# Patient Record
Sex: FEMALE | Race: WHITE | NOT HISPANIC OR LATINO | Employment: FULL TIME | URBAN - METROPOLITAN AREA
[De-identification: names, ages, dates, MRNs, and addresses within clinical notes are randomized per-mention and may not be internally consistent; named-entity substitution may affect disease eponyms.]

---

## 2019-04-30 ENCOUNTER — OFFICE VISIT - HEALTHEAST (OUTPATIENT)
Dept: FAMILY MEDICINE | Facility: CLINIC | Age: 24
End: 2019-04-30

## 2019-04-30 ENCOUNTER — COMMUNICATION - HEALTHEAST (OUTPATIENT)
Dept: TELEHEALTH | Facility: CLINIC | Age: 24
End: 2019-04-30

## 2019-04-30 DIAGNOSIS — Z11.3 SCREENING FOR STD (SEXUALLY TRANSMITTED DISEASE): ICD-10-CM

## 2019-04-30 DIAGNOSIS — R55 NEAR SYNCOPE: ICD-10-CM

## 2019-04-30 LAB
ERYTHROCYTE [DISTWIDTH] IN BLOOD BY AUTOMATED COUNT: 10.5 % (ref 11–14.5)
HCG UR QL: NEGATIVE
HCT VFR BLD AUTO: 42.3 % (ref 35–47)
HGB BLD-MCNC: 14 G/DL (ref 12–16)
MCH RBC QN AUTO: 32.9 PG (ref 27–34)
MCHC RBC AUTO-ENTMCNC: 33.2 G/DL (ref 32–36)
MCV RBC AUTO: 99 FL (ref 80–100)
PLATELET # BLD AUTO: 212 THOU/UL (ref 140–440)
PMV BLD AUTO: 8.6 FL (ref 7–10)
RBC # BLD AUTO: 4.26 MILL/UL (ref 3.8–5.4)
TSH SERPL DL<=0.005 MIU/L-ACNC: 1.61 UIU/ML (ref 0.3–5)
WBC: 6.9 THOU/UL (ref 4–11)

## 2019-04-30 ASSESSMENT — MIFFLIN-ST. JEOR: SCORE: 1395.86

## 2019-05-01 LAB
C TRACH DNA SPEC QL PROBE+SIG AMP: NEGATIVE
N GONORRHOEA DNA SPEC QL NAA+PROBE: NEGATIVE

## 2019-05-02 ENCOUNTER — COMMUNICATION - HEALTHEAST (OUTPATIENT)
Dept: FAMILY MEDICINE | Facility: CLINIC | Age: 24
End: 2019-05-02

## 2019-05-02 DIAGNOSIS — R55 NEAR SYNCOPE: ICD-10-CM

## 2019-05-07 ENCOUNTER — OFFICE VISIT - HEALTHEAST (OUTPATIENT)
Dept: FAMILY MEDICINE | Facility: CLINIC | Age: 24
End: 2019-05-07

## 2019-05-07 DIAGNOSIS — R07.0 THROAT PAIN: ICD-10-CM

## 2019-05-07 LAB — DEPRECATED S PYO AG THROAT QL EIA: NORMAL

## 2019-05-08 LAB — GROUP A STREP BY PCR: NORMAL

## 2019-05-14 ENCOUNTER — OFFICE VISIT - HEALTHEAST (OUTPATIENT)
Dept: FAMILY MEDICINE | Facility: CLINIC | Age: 24
End: 2019-05-14

## 2019-05-14 DIAGNOSIS — R09.81 NASAL CONGESTION: ICD-10-CM

## 2019-05-14 DIAGNOSIS — J02.9 SORE THROAT: ICD-10-CM

## 2019-05-14 DIAGNOSIS — H65.192 ACUTE EFFUSION OF LEFT EAR: ICD-10-CM

## 2019-05-14 LAB — DEPRECATED S PYO AG THROAT QL EIA: NORMAL

## 2019-05-15 ENCOUNTER — HOSPITAL ENCOUNTER (OUTPATIENT)
Dept: CARDIOLOGY | Facility: HOSPITAL | Age: 24
Discharge: HOME | End: 2019-05-15

## 2019-05-15 DIAGNOSIS — R55 NEAR SYNCOPE: ICD-10-CM

## 2019-05-15 LAB
AORTIC ROOT: 2.8 CM
BSA FOR ECHO PROCEDURE: 1.73 M2
CV BLOOD PRESSURE: NORMAL MMHG
CV ECHO HEIGHT: 65 IN
CV ECHO WEIGHT: 145 LBS
DOP CALC LVOT AREA: 2.54 CM2
DOP CALC LVOT DIAMETER: 1.8 CM
DOP CALC LVOT PEAK VEL: 85 CM/S
DOP CALC LVOT STROKE VOLUME: 45.3 CM3
DOP CALCLVOT PEAK VEL VTI: 17.8 CM
EJECTION FRACTION: 73 % (ref 55–75)
FRACTIONAL SHORTENING: 45.8 % (ref 28–44)
GROUP A STREP BY PCR: NORMAL
INTERVENTRICULAR SEPTUM IN END DIASTOLE: 0.71 CM (ref 0.6–0.9)
IVS/PW RATIO: 1.1
LEFT ATRIUM SIZE: 3.1 CM
LEFT ATRIUM TO AORTIC ROOT RATIO: 1.11 NO UNITS
LEFT VENTRICLE CARDIAC INDEX: 2 L/MIN/M2
LEFT VENTRICLE CARDIAC OUTPUT: 3.4 L/MIN
LEFT VENTRICLE DIASTOLIC VOLUME INDEX: 34.8 CM3/M2 (ref 29–61)
LEFT VENTRICLE DIASTOLIC VOLUME: 60.2 CM3 (ref 46–106)
LEFT VENTRICLE HEART RATE: 76 BPM
LEFT VENTRICLE MASS INDEX: 46.8 G/M2
LEFT VENTRICLE SYSTOLIC VOLUME INDEX: 9.4 CM3/M2 (ref 8–24)
LEFT VENTRICLE SYSTOLIC VOLUME: 16.3 CM3 (ref 14–42)
LEFT VENTRICULAR INTERNAL DIMENSION IN DIASTOLE: 4.17 CM (ref 3.8–5.2)
LEFT VENTRICULAR INTERNAL DIMENSION IN SYSTOLE: 2.26 CM (ref 2.2–3.5)
LEFT VENTRICULAR MASS: 81 G
LEFT VENTRICULAR OUTFLOW TRACT MEAN GRADIENT: 2 MMHG
LEFT VENTRICULAR OUTFLOW TRACT MEAN VELOCITY: 58.8 CM/S
LEFT VENTRICULAR OUTFLOW TRACT PEAK GRADIENT: 3 MMHG
LEFT VENTRICULAR POSTERIOR WALL IN END DIASTOLE: 0.65 CM (ref 0.6–0.9)
LV STROKE VOLUME INDEX: 26.2 ML/M2
MITRAL VALVE E/A RATIO: 2
MV AVERAGE E/E' RATIO: 5.2 CM/S
MV DECELERATION TIME: 225 MS
MV E'TISSUE VEL-LAT: 20 CM/S
MV E'TISSUE VEL-MED: 13.8 CM/S
MV LATERAL E/E' RATIO: 4.4
MV MEDIAL E/E' RATIO: 6.3
MV PEAK A VELOCITY: 43.7 CM/S
MV PEAK E VELOCITY: 87.3 CM/S
NUC REST DIASTOLIC VOLUME INDEX: 2320 LBS
NUC REST SYSTOLIC VOLUME INDEX: 65 IN

## 2019-05-15 ASSESSMENT — MIFFLIN-ST. JEOR
SCORE: 1393.6
SCORE: 1393.6

## 2019-05-16 ENCOUNTER — COMMUNICATION - HEALTHEAST (OUTPATIENT)
Dept: FAMILY MEDICINE | Facility: CLINIC | Age: 24
End: 2019-05-16

## 2019-05-24 ENCOUNTER — OFFICE VISIT - HEALTHEAST (OUTPATIENT)
Dept: CARDIOLOGY | Facility: CLINIC | Age: 24
End: 2019-05-24

## 2019-05-24 DIAGNOSIS — R06.02 SOB (SHORTNESS OF BREATH): ICD-10-CM

## 2019-05-24 DIAGNOSIS — R07.9 CHEST PAIN, UNSPECIFIED TYPE: ICD-10-CM

## 2019-05-24 DIAGNOSIS — R42 DIZZINESS: ICD-10-CM

## 2019-05-24 ASSESSMENT — MIFFLIN-ST. JEOR: SCORE: 1398.13

## 2019-05-25 LAB
ATRIAL RATE - MUSE: 66 BPM
DIASTOLIC BLOOD PRESSURE - MUSE: NORMAL MMHG
INTERPRETATION ECG - MUSE: NORMAL
P AXIS - MUSE: 34 DEGREES
PR INTERVAL - MUSE: 150 MS
QRS DURATION - MUSE: 84 MS
QT - MUSE: 396 MS
QTC - MUSE: 415 MS
R AXIS - MUSE: 77 DEGREES
SYSTOLIC BLOOD PRESSURE - MUSE: NORMAL MMHG
T AXIS - MUSE: 44 DEGREES
VENTRICULAR RATE- MUSE: 66 BPM

## 2019-05-29 ENCOUNTER — COMMUNICATION - HEALTHEAST (OUTPATIENT)
Dept: FAMILY MEDICINE | Facility: CLINIC | Age: 24
End: 2019-05-29

## 2019-10-03 ENCOUNTER — RECORDS - HEALTHEAST (OUTPATIENT)
Dept: ADMINISTRATIVE | Facility: OTHER | Age: 24
End: 2019-10-03

## 2019-11-24 ENCOUNTER — OFFICE VISIT (OUTPATIENT)
Dept: URGENT CARE | Facility: URGENT CARE | Age: 24
End: 2019-11-24
Payer: COMMERCIAL

## 2019-11-24 VITALS
HEART RATE: 71 BPM | HEIGHT: 66 IN | OXYGEN SATURATION: 98 % | BODY MASS INDEX: 22.82 KG/M2 | WEIGHT: 142 LBS | SYSTOLIC BLOOD PRESSURE: 107 MMHG | DIASTOLIC BLOOD PRESSURE: 74 MMHG | TEMPERATURE: 98.1 F

## 2019-11-24 DIAGNOSIS — J32.9 SINOBRONCHITIS: Primary | ICD-10-CM

## 2019-11-24 DIAGNOSIS — J40 SINOBRONCHITIS: Primary | ICD-10-CM

## 2019-11-24 LAB
DEPRECATED S PYO AG THROAT QL EIA: NORMAL
SPECIMEN SOURCE: NORMAL

## 2019-11-24 PROCEDURE — 87081 CULTURE SCREEN ONLY: CPT | Performed by: FAMILY MEDICINE

## 2019-11-24 PROCEDURE — 87880 STREP A ASSAY W/OPTIC: CPT | Performed by: FAMILY MEDICINE

## 2019-11-24 PROCEDURE — 99203 OFFICE O/P NEW LOW 30 MIN: CPT | Performed by: FAMILY MEDICINE

## 2019-11-24 RX ORDER — AZITHROMYCIN 250 MG/1
TABLET, FILM COATED ORAL
Qty: 6 TABLET | Refills: 0 | Status: SHIPPED | OUTPATIENT
Start: 2019-11-24 | End: 2019-11-29

## 2019-11-24 ASSESSMENT — MIFFLIN-ST. JEOR: SCORE: 1400.86

## 2019-11-24 NOTE — PROGRESS NOTES
Subjective: She gets sick about 5 days ago with a headache and a sore throat and a fever up to 101 maximal, was feeling better after 2 or 3 days then Thursday and Friday he felt really back to normal almost, went back to work, and then suddenly things got worse yesterday with pain in her sinuses and a worsening sore throat.  No exposures to anything she is aware of.    Objective: ENT with some maxillary pain, neck with some anterior nodes that are painful, lungs clear, strep test negative.    Assessment and plan: Originally viral illness but now with second sickening, not strep.  Zithromax would be appropriate treatment.

## 2019-11-25 LAB
BACTERIA SPEC CULT: NORMAL
SPECIMEN SOURCE: NORMAL

## 2020-01-20 ENCOUNTER — COMMUNICATION - HEALTHEAST (OUTPATIENT)
Dept: FAMILY MEDICINE | Facility: CLINIC | Age: 25
End: 2020-01-20

## 2020-01-24 ENCOUNTER — OFFICE VISIT - HEALTHEAST (OUTPATIENT)
Dept: INTERNAL MEDICINE | Facility: CLINIC | Age: 25
End: 2020-01-24

## 2020-01-24 DIAGNOSIS — N89.8 VAGINAL IRRITATION: ICD-10-CM

## 2020-01-24 DIAGNOSIS — N92.6 IRREGULAR MENSTRUAL BLEEDING: ICD-10-CM

## 2020-01-24 LAB
CLUE CELLS: NORMAL
TRICHOMONAS, WET PREP: NORMAL
YEAST, WET PREP: NORMAL

## 2020-01-24 ASSESSMENT — MIFFLIN-ST. JEOR: SCORE: 1367.23

## 2020-03-11 ENCOUNTER — COMMUNICATION - HEALTHEAST (OUTPATIENT)
Dept: INTERNAL MEDICINE | Facility: CLINIC | Age: 25
End: 2020-03-11

## 2020-03-11 ENCOUNTER — COMMUNICATION - HEALTHEAST (OUTPATIENT)
Dept: SCHEDULING | Facility: CLINIC | Age: 25
End: 2020-03-11

## 2020-03-12 ENCOUNTER — OFFICE VISIT - HEALTHEAST (OUTPATIENT)
Dept: INTERNAL MEDICINE | Facility: CLINIC | Age: 25
End: 2020-03-12

## 2020-03-12 DIAGNOSIS — J20.9 ACUTE BRONCHITIS WITH SYMPTOMS > 10 DAYS: ICD-10-CM

## 2020-03-12 DIAGNOSIS — J01.90 ACUTE SINUSITIS WITH SYMPTOMS > 10 DAYS: ICD-10-CM

## 2020-03-12 ASSESSMENT — MIFFLIN-ST. JEOR: SCORE: 1379.99

## 2020-03-23 ENCOUNTER — VIRTUAL VISIT (OUTPATIENT)
Dept: FAMILY MEDICINE | Facility: OTHER | Age: 25
End: 2020-03-23

## 2020-03-23 NOTE — PROGRESS NOTES
"Date: 2020 17:44:02  Clinician: Coral Franks  Clinician NPI: [id number]  Patient: Alicia Reyes  Patient : 1995  Patient Address: [address] [place] [id number]  Patient Phone: [phone]  Visit Protocol: URI  Patient Summary:  Alicia is a 24 year old ( : 1995 ) female who initiated a Visit for cold, sinus infection, or influenza. When asked the question \"Please sign me up to receive news, health information and promotions from [employer].\", Alicia responded \"Yes\".    Alicia states her symptoms started gradually 1 month or more ago.   Her symptoms consist of rhinitis, myalgia, a cough, nasal congestion, and malaise. She is experiencing mild difficulty breathing with activities but can speak normally in full sentences.   Symptom details     Nasal secretions: The color of her mucus is clear, white, and yellow.    Cough: Alicia coughs every 5-10 minutes and her cough is more bothersome at night. Phlegm comes into her throat when she coughs. She does not believe her cough is caused by post-nasal drip. The color of the phlegm is clear, yellow, and white.      Alicia denies having ear pain, enlarged lymph nodes, facial pain or pressure, wheezing, sore throat, chills, teeth pain, headache, and fever. She also denies double sickening (worsening symptoms after initial improvement) and having recent facial or sinus surgery in the past 60 days.   Precipitating events  She has not recently been exposed to someone with influenza. Alicia has been in close contact with the following high risk individuals: immunocompromised people.   Pertinent COVID-19 (Coronavirus) information  Alicia has not traveled internationally or to the areas where COVID-19 (Coronavirus) is widespread, including cruise ship travel in the last 14 days before the start of her symptoms.   Alicia has not had a close contact with a laboratory-confirmed COVID-19 patient within 14 days of symptom onset. She also has not had a close contact with a " suspected COVID-19 patient within 14 days of symptom onset.   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   Pertinent medical history  Alicia has taken an antibiotic medication in the past month. Antibiotic details as reported by the patient (free text): Amoxicillin- currently, have 3 days left, not feeling better   Alicia does not get yeast infections when she takes antibiotics.   Alicia does not need a return to work/school note.   Weight: 150 lbs   Alicia does not smoke or use smokeless tobacco.   She denies pregnancy and denies breastfeeding. She has menstruated in the past month.   Additional information as reported by the patient (free text): Productive cough began developing Feb 21 along with daily headaches. Headaches stopped, but cough persisted. I've only had a cough up until about 3 or 4 days ago when I started developing a runny/stuffy nose. Over the past couple of weeks, I have had episodes of shortness of breath/chest heaviness and feeling like I'm going to pass out. I went into my primary care on 3/12 who prescribed me amoxicillin. I haven't been feeling better.   Weight: 150 lbs    MEDICATIONS: norgestimate-ethinyl estradiol oral, benzonatate oral, cephalexin oral, amoxicillin oral, ALLERGIES: NKDA  Clinician Response:  Dear Alicia,   Based on the information you have provided, further evaluation in urgent care is indicated. You may walk in to one of our designated urgent care sites below that is prepared to see patients who have symptoms that could indicate Coronavirus (Covid-19).   For your information: At this time we will NOT perform coronavirus testing in urgent care sites. This test is currently being reserved for patients who are being admitted to the hospital.  [facility] [address] [place] [address]. Hours: M-F 11am -- 9pm, Sat-Sun 9am-5pm  [facility] [address] [place] [id number]. Hour: M-F 1pm-9pm, Sat 8am-9pm, Sunday  10am-8pm  [facility] [address] [place] [address]. Hours: M-F 7am-7pm, Sat-Sun 8am-4pm.   PLEASE BRING DOCUMENTATION FROM THIS COMPLETED [facility] VISIT TO YOUR URGENT CARE VISIT.     For more information about COVID19 and options for caring for yourself at home, please visit the CDC website at https://www.cdc.gov/coronavirus/2019-ncov/about/steps-when-sick.html  For more options for care at [facility], please visit our website at [email or web address]    Diagnosis: Acute upper respiratory infection, unspecified  Diagnosis ICD: J06.9

## 2020-06-04 ENCOUNTER — HOSPITAL ENCOUNTER (EMERGENCY)
Facility: CLINIC | Age: 25
Discharge: HOME | End: 2020-06-04
Attending: EMERGENCY MEDICINE | Admitting: EMERGENCY MEDICINE
Payer: COMMERCIAL

## 2020-06-04 VITALS
TEMPERATURE: 98 F | HEART RATE: 92 BPM | RESPIRATION RATE: 20 BRPM | OXYGEN SATURATION: 99 % | DIASTOLIC BLOOD PRESSURE: 84 MMHG | SYSTOLIC BLOOD PRESSURE: 117 MMHG

## 2020-06-04 DIAGNOSIS — R00.2 PALPITATIONS: ICD-10-CM

## 2020-06-04 PROCEDURE — 99283 EMERGENCY DEPT VISIT LOW MDM: CPT | Performed by: EMERGENCY MEDICINE

## 2020-06-04 PROCEDURE — 93010 ELECTROCARDIOGRAM REPORT: CPT | Mod: Z6 | Performed by: EMERGENCY MEDICINE

## 2020-06-04 PROCEDURE — 93005 ELECTROCARDIOGRAM TRACING: CPT | Performed by: EMERGENCY MEDICINE

## 2020-06-04 PROCEDURE — 99283 EMERGENCY DEPT VISIT LOW MDM: CPT | Mod: 25 | Performed by: EMERGENCY MEDICINE

## 2020-06-04 ASSESSMENT — ENCOUNTER SYMPTOMS
FEVER: 0
PALPITATIONS: 1
LIGHT-HEADEDNESS: 1

## 2020-06-04 NOTE — ED AVS SNAPSHOT
[facility], [place], Emergency Department  [address] [place] [id number]  Phone:  [phone]                                    Alicia Reyes   MRN: [id number]    Department:  [facility], Emergency Department   Date of Visit:  6/4/2020           After Visit Summary Signature Page    I have received my discharge instructions, and my questions have been answered. I have discussed any challenges I see with this plan with the nurse or doctor.    ..........................................................................................................................................  Patient/Patient Representative Signature      ..........................................................................................................................................  Patient Representative Print Name and Relationship to Patient    ..................................................               ................................................  Date                                   Time    ..........................................................................................................................................  Reviewed by Signature/Title    ...................................................              ..............................................  Date                                               Time          22EPIC Rev 08/18

## 2020-06-04 NOTE — ED TRIAGE NOTES
"Patient reports an period where she \"blacked out\". She was on a video conference call and was in the middle of the call when she reports she was suddenly finished with the call and unsure how the remainder of the call went. She then experienced a sudden onset of bilateral numbness in hands and shortness of breath. Her hands are still numb, but sensation is improving.   "

## 2020-06-04 NOTE — DISCHARGE INSTRUCTIONS
Please make an appointment to follow up with Your Primary Care Provider as soon as possible for a recheck, especially if you are having any further symptom episodes.    Return if you feel worse, light headed or short of breath.

## 2020-06-04 NOTE — ED PROVIDER NOTES
[facility] EMERGENCY DEPARTMENT ([facility])  June 4, 2020  History     Chief Complaint   Patient presents with     Shortness of Breath     Altered Mental Status     The history is provided by the patient and medical records.     Alicia Reyes is a 24 year old female who presents to the ER for evaluation after having multiple near syncopal spells today.  She has had one episode of syncope approximately 8 years ago in her college dorm shower, but at that time she was sick. No subsequent syncopal spells, but she has had recurrent spells of palpitations.  These typically happen when she is driving.  She has been evaluated with EKG and Holter monitor studies, both were unrevealing.  She has noticed that these episodes are occurring more frequently. She works for [employer] and has been working from home since March. Today she was on a video conference call with a patient when she had an episode of feeling woozy, flushed and mildly lightheaded.  She states that she had trouble concentrating.  She states that her hearing seemed echo-y to her and she felt if she was out of her own body.  She also felt like she was having hot flashes, and developed bilateral numbness in her hands.  Her conference call ended but she could not recall how the call ended.  Afterwards she noticed multiple episodes of feeling near syncope and so presents for evaluation.  She notes that her heart did feel like it was racing at the time of these spells.  She denies any chest discomfort.  She endorses mild shortness of breath at this time. No ankle swelling. No recent cough or cold.  She states that the last time she was sick was in February with a cough.  She has noticed some swollen lymph nodes right side of neck and jaw for past few weeks, thought it was due to seasonal allergies and has been taking Zyrtec for past 5 days. She does take her temperature once a day, no fevers.  Her last menses was 1 week and a half ago, doesn't think she's  pregnant.  She did stop oral contraceptives a few days ago.     PAST MEDICAL HISTORY: No past medical history on file.    PAST SURGICAL HISTORY: No past surgical history on file.    Past medical history, past surgical history, medications, and allergies were reviewed with the patient. Additional pertinent items: None    FAMILY HISTORY: No family history on file.    SOCIAL HISTORY:   Social History     Tobacco Use     Smoking status: Never Smoker     Smokeless tobacco: Never Used   Substance Use Topics     Alcohol use: Not on file     Social history was reviewed with the patient. Additional pertinent items: None      Discharge Medication List as of 6/4/2020  3:28 PM      CONTINUE these medications which have NOT CHANGED    Details   Norgestim-Eth Estrad Triphasic (TRI-LO-KELLEN PO) Historical              No Known Allergies     Review of Systems   Constitutional: Negative for fever.   Cardiovascular: Positive for palpitations. Negative for leg swelling.   Neurological: Positive for light-headedness. Negative for syncope. Numbness: Paresthesias in hands.     A complete review of systems was performed with pertinent positives and negatives noted in the HPI, and all other systems negative.    Physical Exam   BP: (!) 135/118  Pulse: 96  Heart Rate: 85  Temp: 98  F (36.7  C)  Resp: 16  SpO2: 98 %      Physical Exam  Vitals signs and nursing note reviewed.   Constitutional:       General: She is not in acute distress.  HENT:      Head: Atraumatic.      Mouth/Throat:      Mouth: Mucous membranes are moist.   Eyes:      General: No scleral icterus.     Extraocular Movements: Extraocular movements intact.   Neck:      Musculoskeletal: Neck supple.   Cardiovascular:      Rate and Rhythm: Normal rate and regular rhythm.      Heart sounds: Normal heart sounds. No murmur.   Pulmonary:      Effort: Pulmonary effort is normal. No respiratory distress.      Breath sounds: No wheezing, rhonchi or rales.   Chest:      Chest wall: No  tenderness.   Abdominal:      Palpations: Abdomen is soft.      Tenderness: There is no abdominal tenderness. There is no guarding or rebound.   Musculoskeletal:      Right lower leg: No edema.      Left lower leg: No edema.   Skin:     General: Skin is warm.      Coloration: Skin is not pale.   Neurological:      General: No focal deficit present.      Mental Status: She is alert and oriented to person, place, and time.         ED Course        Procedures             EKG Interpretation:      Interpreted by Sandor Pastrana MD  Time reviewed: On arrival  Symptoms at time of EKG: Palpitations  Rhythm: normal sinus   Rate: 71  Axis: normal  Ectopy: none  Conduction: normal  ST Segments/ T Waves: No ST-T wave changes  Q Waves: none  Comparison to prior: Unchanged    Clinical Impression: normal EKG                        No results found for this or any previous visit (from the past 24 hour(s)).  Medications - No data to display          Assessments & Plan (with Medical Decision Making)   The patient had an episode of palpitations with a feeling of lightheadedness but did not pass out.  She had no cardiac ischemic symptoms.  What bothered her the most in talking to her at length was that she felt numb in both hands and on the sides of her cheeks.  She does admit that she was feeling quite anxious and though symptoms are more consistent with hyperventilation.  She underwent a Holter monitor in the past without identification of any arrhythmia.  We watched her on the monitor for 2 hours in the ED and she continued to feel better.  There is no indication for laboratory tests as she has no change in oral intake and is otherwise healthy.  The patient was still having mild symptoms when I first visited with her and on the monitor she had a heart rate in the 80s.  We went over how to measure her own pulse and she agrees to return if she has any sudden onset of rates above 120.  She is very comfortable going home.  We  discussed whether to set her up for another Holter monitor or Zio patch.  Given her normal EKG, no significant risk factors for syncope or family history of arrhythmias she will follow-up with a cardiologist or primary doctor to decide if any further testing needs to be done.  Her symptoms may have been brought on by sudden discontinuation of her oral contraceptive which she has been on since she was 11, primarily taking it for acne.  She has no symptoms or signs of a PE and is PERC negative.  She has no symptoms of fever or dyspnea to suggest the coronavirus.    I have reviewed the nursing notes.    I have reviewed the findings, diagnosis, plan and need for follow up with the patient.    Discharge Medication List as of 6/4/2020  3:28 PM          Final diagnoses:   Palpitations     I, Brigette Ram, am serving as a trained medical scribe to document services personally performed by Sandor Pastrana MD based on the provider's statements to me on June 4, 2020.  This document has been checked and approved by the attending provider.    I, Sandor Pastrana MD, was physically present and have reviewed and verified the accuracy of this note documented by Brigette Ram, medical scribe.     6/4/2020   [facility], [place], EMERGENCY DEPARTMENT     Sandor Pastrana MD  06/05/20 [id number]

## 2020-06-05 LAB — INTERPRETATION ECG - MUSE: NORMAL

## 2020-06-08 ENCOUNTER — OFFICE VISIT - HEALTHEAST (OUTPATIENT)
Dept: FAMILY MEDICINE | Facility: CLINIC | Age: 25
End: 2020-06-08

## 2020-06-08 ENCOUNTER — COMMUNICATION - HEALTHEAST (OUTPATIENT)
Dept: INTERNAL MEDICINE | Facility: CLINIC | Age: 25
End: 2020-06-08

## 2020-06-08 DIAGNOSIS — R07.89 ATYPICAL CHEST PAIN: ICD-10-CM

## 2020-06-09 ENCOUNTER — OFFICE VISIT - HEALTHEAST (OUTPATIENT)
Dept: FAMILY MEDICINE | Facility: CLINIC | Age: 25
End: 2020-06-09

## 2020-06-09 ENCOUNTER — AMBULATORY - HEALTHEAST (OUTPATIENT)
Dept: LAB | Facility: CLINIC | Age: 25
End: 2020-06-09

## 2020-06-09 DIAGNOSIS — R00.0 SINUS TACHYCARDIA: ICD-10-CM

## 2020-06-09 DIAGNOSIS — F41.9 ANXIETY: ICD-10-CM

## 2020-06-09 DIAGNOSIS — R06.09 DOE (DYSPNEA ON EXERTION): ICD-10-CM

## 2020-06-09 DIAGNOSIS — N64.4 BREAST PAIN, LEFT: ICD-10-CM

## 2020-06-09 LAB
D DIMER PPP FEU-MCNC: <0.27 FEU UG/ML
ERYTHROCYTE [DISTWIDTH] IN BLOOD BY AUTOMATED COUNT: 10.4 % (ref 11–14.5)
HCT VFR BLD AUTO: 43.4 % (ref 35–47)
HGB BLD-MCNC: 15 G/DL (ref 12–16)
MCH RBC QN AUTO: 33.6 PG (ref 27–34)
MCHC RBC AUTO-ENTMCNC: 34.6 G/DL (ref 32–36)
MCV RBC AUTO: 97 FL (ref 80–100)
PLATELET # BLD AUTO: 180 THOU/UL (ref 140–440)
PMV BLD AUTO: 8.8 FL (ref 7–10)
RBC # BLD AUTO: 4.46 MILL/UL (ref 3.8–5.4)
TROPONIN I SERPL-MCNC: <0.01 NG/ML (ref 0–0.29)
TSH SERPL DL<=0.005 MIU/L-ACNC: 1.68 UIU/ML (ref 0.3–5)
WBC: 4.8 THOU/UL (ref 4–11)

## 2020-06-09 ASSESSMENT — ANXIETY QUESTIONNAIRES
3. WORRYING TOO MUCH ABOUT DIFFERENT THINGS: SEVERAL DAYS
5. BEING SO RESTLESS THAT IT IS HARD TO SIT STILL: SEVERAL DAYS
GAD7 TOTAL SCORE: 15
IF YOU CHECKED OFF ANY PROBLEMS ON THIS QUESTIONNAIRE, HOW DIFFICULT HAVE THESE PROBLEMS MADE IT FOR YOU TO DO YOUR WORK, TAKE CARE OF THINGS AT HOME, OR GET ALONG WITH OTHER PEOPLE: VERY DIFFICULT
7. FEELING AFRAID AS IF SOMETHING AWFUL MIGHT HAPPEN: NEARLY EVERY DAY
1. FEELING NERVOUS, ANXIOUS, OR ON EDGE: NEARLY EVERY DAY
4. TROUBLE RELAXING: MORE THAN HALF THE DAYS
6. BECOMING EASILY ANNOYED OR IRRITABLE: MORE THAN HALF THE DAYS
2. NOT BEING ABLE TO STOP OR CONTROL WORRYING: NEARLY EVERY DAY

## 2020-06-09 ASSESSMENT — PATIENT HEALTH QUESTIONNAIRE - PHQ9: SUM OF ALL RESPONSES TO PHQ QUESTIONS 1-9: 1

## 2020-06-10 ENCOUNTER — COMMUNICATION - HEALTHEAST (OUTPATIENT)
Dept: FAMILY MEDICINE | Facility: CLINIC | Age: 25
End: 2020-06-10

## 2020-06-11 ENCOUNTER — COMMUNICATION - HEALTHEAST (OUTPATIENT)
Dept: FAMILY MEDICINE | Facility: CLINIC | Age: 25
End: 2020-06-11

## 2020-06-15 ENCOUNTER — HOSPITAL ENCOUNTER (OUTPATIENT)
Dept: MAMMOGRAPHY | Facility: CLINIC | Age: 25
Discharge: HOME | End: 2020-06-15

## 2020-06-15 ENCOUNTER — COMMUNICATION - HEALTHEAST (OUTPATIENT)
Dept: FAMILY MEDICINE | Facility: CLINIC | Age: 25
End: 2020-06-15

## 2020-06-15 ENCOUNTER — HOSPITAL ENCOUNTER (OUTPATIENT)
Dept: CARDIOLOGY | Facility: HOSPITAL | Age: 25
Discharge: HOME | End: 2020-06-15

## 2020-06-15 DIAGNOSIS — N64.4 BREAST PAIN, LEFT: ICD-10-CM

## 2020-06-15 DIAGNOSIS — R06.09 DOE (DYSPNEA ON EXERTION): ICD-10-CM

## 2020-06-15 LAB
CV STRESS MAX HR HE: 168
ECHO EJECTION FRACTION ESTIMATED: 60 %
RATE PRESSURE PRODUCT: NORMAL
STRESS ECHO BASELINE DIASTOLIC HE: 78
STRESS ECHO BASELINE HR: 77 BPM
STRESS ECHO BASELINE SYSTOLIC BP: 116
STRESS ECHO CALCULATED PERCENT HR: 87 %
STRESS ECHO LAST STRESS DIASTOLIC BP: 72
STRESS ECHO LAST STRESS SYSTOLIC BP: 150
STRESS ECHO POST ESTIMATED WORKLOAD: 12.1 METS
STRESS ECHO POST EXERCISE DUR MIN: 12 MIN
STRESS ECHO POST EXERCISE DUR SEC: 0 SEC
STRESS ECHO TARGET HR: 194

## 2020-06-19 ENCOUNTER — COMMUNICATION - HEALTHEAST (OUTPATIENT)
Dept: FAMILY MEDICINE | Facility: CLINIC | Age: 25
End: 2020-06-19

## 2020-07-03 ENCOUNTER — COMMUNICATION - HEALTHEAST (OUTPATIENT)
Dept: PEDIATRICS | Facility: CLINIC | Age: 25
End: 2020-07-03

## 2020-07-09 ENCOUNTER — OFFICE VISIT - HEALTHEAST (OUTPATIENT)
Dept: FAMILY MEDICINE | Facility: CLINIC | Age: 25
End: 2020-07-09

## 2020-07-09 DIAGNOSIS — F41.0 PANIC ATTACK: ICD-10-CM

## 2020-07-09 DIAGNOSIS — M79.622 PAIN IN AXILLA, LEFT: ICD-10-CM

## 2020-07-09 DIAGNOSIS — K21.9 GASTROESOPHAGEAL REFLUX DISEASE, ESOPHAGITIS PRESENCE NOT SPECIFIED: ICD-10-CM

## 2020-07-09 ASSESSMENT — MIFFLIN-ST. JEOR: SCORE: 1375.45

## 2020-07-23 ENCOUNTER — COMMUNICATION - HEALTHEAST (OUTPATIENT)
Dept: INTERNAL MEDICINE | Facility: CLINIC | Age: 25
End: 2020-07-23

## 2020-08-19 ENCOUNTER — AMBULATORY - HEALTHEAST (OUTPATIENT)
Dept: INTERNAL MEDICINE | Facility: CLINIC | Age: 25
End: 2020-08-19

## 2020-08-19 ENCOUNTER — OFFICE VISIT - HEALTHEAST (OUTPATIENT)
Dept: INTERNAL MEDICINE | Facility: CLINIC | Age: 25
End: 2020-08-19

## 2020-08-19 DIAGNOSIS — R53.83 FATIGUE, UNSPECIFIED TYPE: ICD-10-CM

## 2020-08-19 DIAGNOSIS — K21.9 GASTROESOPHAGEAL REFLUX DISEASE, ESOPHAGITIS PRESENCE NOT SPECIFIED: ICD-10-CM

## 2020-08-19 DIAGNOSIS — R35.0 URINARY FREQUENCY: ICD-10-CM

## 2020-08-19 DIAGNOSIS — Z12.4 SCREENING FOR MALIGNANT NEOPLASM OF CERVIX: ICD-10-CM

## 2020-08-19 DIAGNOSIS — F41.0 PANIC ATTACK: ICD-10-CM

## 2020-08-19 LAB
ALBUMIN SERPL-MCNC: 4.2 G/DL (ref 3.5–5)
ALBUMIN UR-MCNC: NEGATIVE MG/DL
ALP SERPL-CCNC: 47 U/L (ref 45–120)
ALT SERPL W P-5'-P-CCNC: 21 U/L (ref 0–45)
ANION GAP SERPL CALCULATED.3IONS-SCNC: 10 MMOL/L (ref 5–18)
APPEARANCE UR: CLEAR
AST SERPL W P-5'-P-CCNC: 19 U/L (ref 0–40)
BASOPHILS # BLD AUTO: 0.1 THOU/UL (ref 0–0.2)
BASOPHILS NFR BLD AUTO: 1 % (ref 0–2)
BILIRUB SERPL-MCNC: 0.2 MG/DL (ref 0–1)
BILIRUB UR QL STRIP: NEGATIVE
BUN SERPL-MCNC: 15 MG/DL (ref 8–22)
CALCIUM SERPL-MCNC: 10.1 MG/DL (ref 8.5–10.5)
CHLORIDE BLD-SCNC: 107 MMOL/L (ref 98–107)
CO2 SERPL-SCNC: 23 MMOL/L (ref 22–31)
COLOR UR AUTO: YELLOW
CREAT SERPL-MCNC: 0.79 MG/DL (ref 0.6–1.1)
EOSINOPHIL # BLD AUTO: 0.1 THOU/UL (ref 0–0.4)
EOSINOPHIL NFR BLD AUTO: 2 % (ref 0–6)
ERYTHROCYTE [DISTWIDTH] IN BLOOD BY AUTOMATED COUNT: 11.3 % (ref 11–14.5)
GFR SERPL CREATININE-BSD FRML MDRD: >60 ML/MIN/1.73M2
GLUCOSE BLD-MCNC: 90 MG/DL (ref 70–125)
GLUCOSE UR STRIP-MCNC: NEGATIVE MG/DL
HCT VFR BLD AUTO: 41 % (ref 35–47)
HGB BLD-MCNC: 14.2 G/DL (ref 12–16)
HGB UR QL STRIP: NEGATIVE
KETONES UR STRIP-MCNC: NEGATIVE MG/DL
LEUKOCYTE ESTERASE UR QL STRIP: NEGATIVE
LYMPHOCYTES # BLD AUTO: 2.4 THOU/UL (ref 0.8–4.4)
LYMPHOCYTES NFR BLD AUTO: 34 % (ref 20–40)
MCH RBC QN AUTO: 34.5 PG (ref 27–34)
MCHC RBC AUTO-ENTMCNC: 34.7 G/DL (ref 32–36)
MCV RBC AUTO: 100 FL (ref 80–100)
MONOCYTES # BLD AUTO: 0.4 THOU/UL (ref 0–0.9)
MONOCYTES NFR BLD AUTO: 6 % (ref 2–10)
NEUTROPHILS # BLD AUTO: 4.1 THOU/UL (ref 2–7.7)
NEUTROPHILS NFR BLD AUTO: 58 % (ref 50–70)
NITRATE UR QL: NEGATIVE
PH UR STRIP: 5.5 [PH] (ref 5–8)
PLATELET # BLD AUTO: 197 THOU/UL (ref 140–440)
PMV BLD AUTO: 9 FL (ref 7–10)
POTASSIUM BLD-SCNC: 4.8 MMOL/L (ref 3.5–5)
PROT SERPL-MCNC: 7.1 G/DL (ref 6–8)
RBC # BLD AUTO: 4.11 MILL/UL (ref 3.8–5.4)
SODIUM SERPL-SCNC: 140 MMOL/L (ref 136–145)
SP GR UR STRIP: <=1.005 (ref 1–1.03)
TSH SERPL DL<=0.005 MIU/L-ACNC: 2.27 UIU/ML (ref 0.3–5)
UROBILINOGEN UR STRIP-ACNC: NORMAL
WBC: 7.2 THOU/UL (ref 4–11)

## 2020-08-19 ASSESSMENT — MIFFLIN-ST. JEOR: SCORE: 1384.52

## 2020-08-19 ASSESSMENT — ANXIETY QUESTIONNAIRES
7. FEELING AFRAID AS IF SOMETHING AWFUL MIGHT HAPPEN: MORE THAN HALF THE DAYS
6. BECOMING EASILY ANNOYED OR IRRITABLE: SEVERAL DAYS
5. BEING SO RESTLESS THAT IT IS HARD TO SIT STILL: NOT AT ALL
4. TROUBLE RELAXING: NOT AT ALL
1. FEELING NERVOUS, ANXIOUS, OR ON EDGE: SEVERAL DAYS
GAD7 TOTAL SCORE: 4
IF YOU CHECKED OFF ANY PROBLEMS ON THIS QUESTIONNAIRE, HOW DIFFICULT HAVE THESE PROBLEMS MADE IT FOR YOU TO DO YOUR WORK, TAKE CARE OF THINGS AT HOME, OR GET ALONG WITH OTHER PEOPLE: NOT DIFFICULT AT ALL
3. WORRYING TOO MUCH ABOUT DIFFERENT THINGS: NOT AT ALL
2. NOT BEING ABLE TO STOP OR CONTROL WORRYING: NOT AT ALL

## 2020-08-19 ASSESSMENT — PATIENT HEALTH QUESTIONNAIRE - PHQ9: SUM OF ALL RESPONSES TO PHQ QUESTIONS 1-9: 4

## 2020-08-20 LAB — 25(OH)D3 SERPL-MCNC: 36.6 NG/ML (ref 30–80)

## 2020-08-21 LAB
BKR LAB AP ABNORMAL BLEEDING: NO
BKR LAB AP BIRTH CONTROL/HORMONES: NORMAL
BKR LAB AP CERVICAL APPEARANCE: NORMAL
BKR LAB AP GYN ADEQUACY: NORMAL
BKR LAB AP GYN INTERPRETATION: NORMAL
BKR LAB AP HPV REFLEX: NORMAL
BKR LAB AP LMP: NORMAL
BKR LAB AP PATIENT STATUS: NORMAL
BKR LAB AP PREVIOUS ABNORMAL: NO
BKR LAB AP PREVIOUS NORMAL: 2017
HIGH RISK?: NO
PATH REPORT.COMMENTS IMP SPEC: NORMAL
RESULT FLAG (HE HISTORICAL CONVERSION): NORMAL

## 2020-08-24 ENCOUNTER — COMMUNICATION - HEALTHEAST (OUTPATIENT)
Dept: INTERNAL MEDICINE | Facility: CLINIC | Age: 25
End: 2020-08-24

## 2020-09-03 ENCOUNTER — OFFICE VISIT - HEALTHEAST (OUTPATIENT)
Dept: INTERNAL MEDICINE | Facility: CLINIC | Age: 25
End: 2020-09-03

## 2020-09-03 DIAGNOSIS — R59.1 LYMPHADENOPATHY: ICD-10-CM

## 2020-09-03 DIAGNOSIS — K21.9 GASTROESOPHAGEAL REFLUX DISEASE, ESOPHAGITIS PRESENCE NOT SPECIFIED: ICD-10-CM

## 2020-09-03 DIAGNOSIS — F41.0 PANIC ATTACK: ICD-10-CM

## 2020-09-03 DIAGNOSIS — R53.83 FATIGUE, UNSPECIFIED TYPE: ICD-10-CM

## 2020-09-03 ASSESSMENT — ANXIETY QUESTIONNAIRES
6. BECOMING EASILY ANNOYED OR IRRITABLE: NOT AT ALL
GAD7 TOTAL SCORE: 4
7. FEELING AFRAID AS IF SOMETHING AWFUL MIGHT HAPPEN: NOT AT ALL
2. NOT BEING ABLE TO STOP OR CONTROL WORRYING: SEVERAL DAYS
3. WORRYING TOO MUCH ABOUT DIFFERENT THINGS: SEVERAL DAYS
5. BEING SO RESTLESS THAT IT IS HARD TO SIT STILL: NOT AT ALL
1. FEELING NERVOUS, ANXIOUS, OR ON EDGE: SEVERAL DAYS
4. TROUBLE RELAXING: SEVERAL DAYS

## 2020-09-03 ASSESSMENT — PATIENT HEALTH QUESTIONNAIRE - PHQ9: SUM OF ALL RESPONSES TO PHQ QUESTIONS 1-9: 1

## 2020-09-28 ENCOUNTER — COMMUNICATION - HEALTHEAST (OUTPATIENT)
Dept: INTERNAL MEDICINE | Facility: CLINIC | Age: 25
End: 2020-09-28

## 2020-09-29 ENCOUNTER — OFFICE VISIT - HEALTHEAST (OUTPATIENT)
Dept: FAMILY MEDICINE | Facility: CLINIC | Age: 25
End: 2020-09-29

## 2020-09-29 DIAGNOSIS — R59.1 LYMPHADENOPATHY: ICD-10-CM

## 2020-09-29 LAB
BASOPHILS # BLD AUTO: 0.1 THOU/UL (ref 0–0.2)
BASOPHILS NFR BLD AUTO: 1 % (ref 0–2)
EOSINOPHIL # BLD AUTO: 0.1 THOU/UL (ref 0–0.4)
EOSINOPHIL NFR BLD AUTO: 1 % (ref 0–6)
ERYTHROCYTE [DISTWIDTH] IN BLOOD BY AUTOMATED COUNT: 10.7 % (ref 11–14.5)
HCT VFR BLD AUTO: 41.1 % (ref 35–47)
HGB BLD-MCNC: 14.2 G/DL (ref 12–16)
LYMPHOCYTES # BLD AUTO: 2.3 THOU/UL (ref 0.8–4.4)
LYMPHOCYTES NFR BLD AUTO: 24 % (ref 20–40)
MCH RBC QN AUTO: 34.2 PG (ref 27–34)
MCHC RBC AUTO-ENTMCNC: 34.4 G/DL (ref 32–36)
MCV RBC AUTO: 99 FL (ref 80–100)
MONOCYTES # BLD AUTO: 0.5 THOU/UL (ref 0–0.9)
MONOCYTES NFR BLD AUTO: 5 % (ref 2–10)
NEUTROPHILS # BLD AUTO: 6.7 THOU/UL (ref 2–7.7)
NEUTROPHILS NFR BLD AUTO: 70 % (ref 50–70)
PLATELET # BLD AUTO: 210 THOU/UL (ref 140–440)
PMV BLD AUTO: 9 FL (ref 7–10)
RBC # BLD AUTO: 4.13 MILL/UL (ref 3.8–5.4)
WBC: 9.6 THOU/UL (ref 4–11)

## 2021-02-26 ENCOUNTER — COMMUNICATION - HEALTHEAST (OUTPATIENT)
Dept: INTERNAL MEDICINE | Facility: CLINIC | Age: 26
End: 2021-02-26

## 2021-03-04 ENCOUNTER — OFFICE VISIT - HEALTHEAST (OUTPATIENT)
Dept: INTERNAL MEDICINE | Facility: CLINIC | Age: 26
End: 2021-03-04

## 2021-03-04 DIAGNOSIS — N91.2 AMENORRHEA: ICD-10-CM

## 2021-03-04 DIAGNOSIS — N92.6 IRREGULAR MENSES: ICD-10-CM

## 2021-03-04 DIAGNOSIS — R59.1 LYMPHADENOPATHY: ICD-10-CM

## 2021-03-04 LAB — HCG UR QL: NEGATIVE

## 2021-03-04 ASSESSMENT — MIFFLIN-ST. JEOR: SCORE: 1384.52

## 2021-03-05 ENCOUNTER — HOSPITAL ENCOUNTER (OUTPATIENT)
Dept: ULTRASOUND IMAGING | Facility: CLINIC | Age: 26
Discharge: HOME | End: 2021-03-05

## 2021-03-05 DIAGNOSIS — R59.1 LYMPHADENOPATHY: ICD-10-CM

## 2021-03-05 DIAGNOSIS — N92.6 IRREGULAR MENSES: ICD-10-CM

## 2021-04-12 ENCOUNTER — COMMUNICATION - HEALTHEAST (OUTPATIENT)
Dept: INTERNAL MEDICINE | Facility: CLINIC | Age: 26
End: 2021-04-12

## 2021-05-27 ASSESSMENT — PATIENT HEALTH QUESTIONNAIRE - PHQ9
SUM OF ALL RESPONSES TO PHQ QUESTIONS 1-9: 1
SUM OF ALL RESPONSES TO PHQ QUESTIONS 1-9: 1
SUM OF ALL RESPONSES TO PHQ QUESTIONS 1-9: 4

## 2021-05-28 ASSESSMENT — ANXIETY QUESTIONNAIRES
GAD7 TOTAL SCORE: 15
GAD7 TOTAL SCORE: 4
GAD7 TOTAL SCORE: 4

## 2021-05-28 NOTE — PROGRESS NOTES
HPI:  Alicia Reyes is a 23 y.o. female who is seen for   Chief Complaint   Patient presents with     [facility]     Birth control   Alicia Reyes has had a recent insurance change and would like to establish care with [facility].  Recently ran out of her birth control.  She has been off of this for approximately 2 months, had a period last week which was regular and has had no concerns with her duration or flow.  She has had no history of STI, has been over 1 year since she had a GC chlamydia and she would like to have the screening.  She has one sexual partner, has same partner for several years.  Has no concerns for other STIs.  She has just finished a masters program, had some episodes of palpitations, chest discomfort, near syncope in the past.  States that it happened 2 different times when she was driving home from [place].  Symptoms improved and just a few seconds with airflow in the car.  She has no previous history of cardiac issues, denies any previous cardiac work-up.  Does have associated fatigue and dizziness with shortness of breath symptoms.  She has had a history of some anxiety especially related to completion of her school program.  She feels that the symptoms are improving since she has finished this stressful time.  ROS: Patient denies fever, chills, sweats, weight change, dizziness, sleep problems, wheezing, cough,  sore throat, changes in hearing, ear pain,tinnitus,  disphagia, sore throat, globus, changes in vision, eye pain eye redness, acid reflux, nausea, vomiting, diarrhea, constipation, black or bloody stools,  Dysuria, frequency, urinary incontinence, nocturia, hematuria, back pain,joint pain, bone pain, muscle cramps,edema, weakness, numbness, tingling of extremities, rash, itching, skin changes, swollen lymph nodes, thirst, increased urination, breast lumps, breast pain, nipple discharge, memory difficulties,  mood swings, (female)vaginal discharge, dyspareunia, menorrhagia, pelvic  pain, sexual dysfunction,   (male) testicular lumps, erectile dysfunction.    No results found for: HGBA1C  No results found for: GLUF, MICROALBUR, LDLCALC, CREATININE  There is no problem list on file for this patient.    No family history on file.  Social History     Socioeconomic History     Marital status: Single     Spouse name: None     Number of children: None     Years of education: None     Highest education level: None   Occupational History     None   Social Needs     Financial resource strain: None     Food insecurity:     Worry: None     Inability: None     Transportation needs:     Medical: None     Non-medical: None   Tobacco Use     Smoking status: Never Smoker     Smokeless tobacco: Never Used   Substance and Sexual Activity     Alcohol use: Yes     Comment: Social      Drug use: Never     Sexual activity: Yes     Partners: Male   Lifestyle     Physical activity:     Days per week: None     Minutes per session: None     Stress: None   Relationships     Social connections:     Talks on phone: None     Gets together: None     Attends [employer] service: None     Active member of club or organization: None     Attends meetings of clubs or organizations: None     Relationship status: None     Intimate partner violence:     Fear of current or ex partner: None     Emotionally abused: None     Physically abused: None     Forced sexual activity: None   Other Topics Concern     None   Social History Narrative     None     No past surgical history on file.  No current outpatient medications on file prior to visit.     No current facility-administered medications on file prior to visit.      No Known Allergies  OB History        1    Para        Term                AB        Living           SAB        TAB        Ectopic        Multiple        Live Births                   I have reviewed the patient's medical history in detail and updated the computerized patient record.  OBJECTIVE:  Wt Readings  from Last 3 Encounters:   04/30/19 145 lb 8 oz (66 kg)     Temp Readings from Last 3 Encounters:   No data found for Temp     BP Readings from Last 3 Encounters:   04/30/19 110/60     Pulse Readings from Last 3 Encounters:   04/30/19 75     Body mass index is 24.21 kg/m .     Alert, cooperative, well-hydrated. Appears well.  Eyes: Pupils equal, round, reactive to light.  HEENT: Sclera white, nares patent, MMM, TM's pearly bilaterally  Neck: supple, without lymphadenopathy, Thyroid freely movable and without hypotrophy or nodularity.   Lungs: Clear to auscultation. No retractions, no increased work of respiration, equal chest rise.   Heart: Regular rate and rhythm, no murmurs, clicks,   Gallops.  Abdomen: Soft, bowel sounds in 4 quadrants with no tenderness to palpation, no organomegaly or masses, no aortic or renal bruits.  Extremities: no tenderness to palpation of gastrocnemius, bilaterally.  Skin: no increased warmth, edema, or erythema of lower legs bilaterally.  Back: No cervical, thoracic or lumbar tenderness to spinous processes or musculature.  Neuro::pupils equal and reactive to light bilaterally, CN II - XII grossly intact. No focal motor/sensory deficits. DTR 2/4 all 4 extremities. Muscle Strength 5/5 all extremities, Rhomberg negative  Labs:  No results found for any previous visit.     ASSESMENT/PLAN:  1. Near syncope  Echo Complete    Holter Monitor    HM2(CBC w/o Differential)    Thyroid Cascade    HM2(CBC w/o Differential)    Thyroid Perham   2. Screening for STD (sexually transmitted disease)  norgestimate-ethinyl estradiol (ORTHO TRI-CYCLEN LO, 28,) 0.18/0.215/0.25 mg-25 mcg Tab tablet    Pregnancy (Beta-hCG, Qual), Urine    Chlamydia trachomatis & Neisseria gonorrhoeae, Amplified Detection   Discussed causes of near syncope, will get Holter monitor and echocardiogram.  Discussed the possibility that these are more anxiety driven.  Advised to watch for any further symptoms, if all resolved with  the stress explained that this is a good sign.  Advise to keep caffeine to minimum, not more than 2 cups in a day.  Drink plenty of water, get good rest.  Will restart her birth control.  Follow-up as needed for any further anxiety or chest discomfort.  Otherwise follow-up in 1 year.  Results of Holter and echocardiogram and refer based on results.  Xochitl Oglesby, MS, PA-C 05/02/19

## 2021-05-28 NOTE — PATIENT INSTRUCTIONS - HE
1.  Your rapid strep test was negative today.  You will only be notified of your confirmatory strep test results if it is positive.  2.  Begin taking Flonase and Zyrtec according to bottle instructions.  3.  If you are not having any improvement in your symptoms of sinus pressure and congestion by Friday go ahead and fill the prescription for Augmentin.  4.  This could possibly still be contagious if it is viral.  Since you have been fever free I do not think you need to be out of work, but please cover your mouth if you sneeze or cough and practice good hand hygiene.

## 2021-05-28 NOTE — PATIENT INSTRUCTIONS - HE
Rapid Strep test was negative.     If overnight test returns positive, we will call tomorrow and call in antibiotic prescription.    No notification means that overnight test returned negative.    Symptoms are likely due to viral infection that will resolve on its own in 1-2 weeks.    May continue with symptomatic treatments including:  - Salt water gargles  - Warm beverages such as a non-caffeinated tea with honey  - Throat drops  - Ibuprofen or acetaminophen for pain or fever relief    If fevers not coming down with acetaminophen or ibuprofen, shortness of breath, not tolerating oral liquid intake, drooling, or stiff neck, return for re-evaluation immediately. Otherwise, if no improvement in the next week, follow up with your primary care provider.

## 2021-05-28 NOTE — PROGRESS NOTES
Assessment:       Throat pain. - likely secondary to seasonal allergies given associated itchy eyes and rhinorrhea.      Plan:       24-hour antihistamines discussed  OTC analgesics discussed.  Salt water gargles, throat lozenges, warm tea with honey PRN  Discussed signs of worsening symptoms and when to follow-up with PCP if no symptom improvement.      Patient Instructions   Rapid Strep test was negative.     If overnight test returns positive, we will call tomorrow and call in antibiotic prescription.    No notification means that overnight test returned negative.    Symptoms are likely due to viral infection that will resolve on its own in 1-2 weeks.    May continue with symptomatic treatments including:  - Salt water gargles  - Warm beverages such as a non-caffeinated tea with honey  - Throat drops  - Ibuprofen or acetaminophen for pain or fever relief    If fevers not coming down with acetaminophen or ibuprofen, shortness of breath, not tolerating oral liquid intake, drooling, or stiff neck, return for re-evaluation immediately. Otherwise, if no improvement in the next week, follow up with your primary care provider.      Subjective:        History was provided by the patient.  Alicia Reyes is a 23 y.o. female who presents for evaluation of a sore throat. Associated symptoms include rhinorrhea, itchy eyes, and dry lips. Onset of symptoms was 1 day ago, gradually worsening since that time.  She is drinking plenty of fluids. She has had recent close exposure to someone with proven streptococcal pharyngitis. Patient denies fevers and cough.    The following portions of the patient's history were reviewed and updated as appropriate: allergies, current medications and problem list.    Review of Systems  Pertinent items are noted in HPI.    Allergies  No Known Allergies      Objective:       /74 (Patient Site: Right Arm, Patient Position: Sitting, Cuff Size: Adult Regular)   Pulse 73   Temp 97.8  F (36.6   C) (Oral)   Wt 146 lb 9.6 oz (66.5 kg)   LMP 04/12/2019   SpO2 100%   Breastfeeding? No   BMI 24.40 kg/m    General appearance: alert, appears stated age, cooperative, no distress and non-toxic  Head: Normocephalic, without obvious abnormality, atraumatic  Ears: TM's intact with mucoid fluid, no erythema or bulging; external ears normal  Nose: no discharge  Throat: posterior oropharynx with mild erythema, no tonsil swelling or exudate; MMM, lips and tongue normal  Neck: no adenopathy and supple, symmetrical, trachea midline  Lungs: clear to auscultation bilaterally and no rhonchi, rales, or wheezing  Heart: regular rate and rhythm, S1, S2 normal, no murmur, click, rub or gallop    Lab Results    Recent Results (from the past 24 hour(s))   Rapid Strep A Screen-Throat   Result Value Ref Range    Rapid Strep A Antigen No Group A Strep detected, presumptive negative No Group A Strep detected, presumptive negative     I personally reviewed these results and discussed findings with the patient.

## 2021-05-28 NOTE — PROGRESS NOTES
Chief Complaint   Patient presents with     Sore Throat     sore throat, congestion, pressure in head, patient boyfriend had strep last week        HPI:  Alicia Reyes is a 23 y.o. female who presents today complaining of sore throat, nasal congestion, and head pressure. Known strep contact from her boyfriend who had it last week. Patient was seen on 5/7/19 when she just started having a sore throat. She tested negative for strep at that time.  She denies any fever, chills, ear pain, cough, abdominal complaints.  She has never had a past medical history of seasonal allergies, but her sister developed seasonal allergies around this age.  She is not currently taking any medications for her symptoms.    History obtained from the patient.    Problem List:  There are no relevant problems documented for this patient.      No past medical history on file.    Social History     Tobacco Use     Smoking status: Never Smoker     Smokeless tobacco: Never Used   Substance Use Topics     Alcohol use: Yes     Comment: Social        Review of Systems   Constitutional: Negative for chills and fever.   HENT: Positive for congestion, postnasal drip, rhinorrhea, sinus pressure and sore throat. Negative for ear pain.    Respiratory: Negative for cough.    Gastrointestinal: Negative.    Skin: Negative for rash.   Allergic/Immunologic: Negative for environmental allergies.       Vitals:    05/14/19 0712   BP: 118/83   Pulse: 76   Resp: 12   Temp: 98.1  F (36.7  C)   TempSrc: Oral   SpO2: 100%   Weight: 145 lb 9.6 oz (66 kg)       Physical Exam   Constitutional: She appears well-developed and well-nourished. No distress.   HENT:   Head: Normocephalic and atraumatic.   Right Ear: Tympanic membrane, external ear and ear canal normal.   Left Ear: External ear and ear canal normal. Tympanic membrane is not injected, not perforated, not erythematous, not retracted and not bulging. A middle ear effusion is present.   Nose: Mucosal edema  present.   Mouth/Throat: Uvula is midline and mucous membranes are normal. Posterior oropharyngeal edema present. No oropharyngeal exudate, posterior oropharyngeal erythema or tonsillar abscesses.   Eyes: Conjunctivae are normal. Right eye exhibits no discharge. Left eye exhibits no discharge.   Neck: Normal range of motion. Neck supple.   Cardiovascular: Normal rate, regular rhythm and normal heart sounds.   Pulmonary/Chest: Effort normal and breath sounds normal. No stridor. No respiratory distress. She has no wheezes.   Lymphadenopathy:     She has cervical adenopathy (right anterior).   Skin: She is not diaphoretic.   Psychiatric: She has a normal mood and affect. Her behavior is normal. Judgment and thought content normal.       Labs:  Recent Results (from the past 72 hour(s))   Rapid Strep A Screen-Throat   Result Value Ref Range    Rapid Strep A Antigen No Group A Strep detected, presumptive negative No Group A Strep detected, presumptive negative       Clinical Decision Making:  Although patient has not had any history of seasonal allergies she states that her sister developed allergies at this age for her.  Her rapid strep was negative again today.  I recommend starting an antihistamine to see if that improves her sinus congestion and postnasal drip, but it and Augmentin prescription was postdated for 5/17/2019 in case her symptoms are not beginning to improve.  This would be covering for possible bacterial sinus infection if is not improving.  At the end of the encounter, I discussed results, diagnosis, medications. Discussed red flags for immediate return to clinic/ER, as well as indications for follow up if no improvement. Patient understood and agreed to plan. Patient was stable for discharge.    1. Nasal congestion  cetirizine (ZYRTEC) 10 MG tablet    fluticasone propionate (FLONASE ALLERGY RELIEF) 50 mcg/actuation nasal spray    amoxicillin-clavulanate (AUGMENTIN) 875-125 mg per tablet   2. Sore  throat  Rapid Strep A Screen-Throat    Group A Strep, RNA Direct Detection, Throat   3. Acute effusion of left ear           Patient Instructions   1.  Your rapid strep test was negative today.  You will only be notified of your confirmatory strep test results if it is positive.  2.  Begin taking Flonase and Zyrtec according to bottle instructions.  3.  If you are not having any improvement in your symptoms of sinus pressure and congestion by Friday go ahead and fill the prescription for Augmentin.  4.  This could possibly still be contagious if it is viral.  Since you have been fever free I do not think you need to be out of work, but please cover your mouth if you sneeze or cough and practice good hand hygiene.

## 2021-05-29 NOTE — PATIENT INSTRUCTIONS - HE
"Below is a list of instructions we discussed today in clinic:   1. I am not certain what is causing your symptoms of light headedness, but given the infrequency and how long it has been occurring, it probably is not something very serious.  2. We could consider placing an implantable heart rhythm monitor called an implantable loop recorder which can monitor your heart rhythm for up to three years. Alternatively you could buy a product called \"[employer] Mobile\" from [employer], available on [employer] for $99 to check your heart rhythm when you are having symptoms, which you can have sent to your email via a pdf to print off for my review.  3. Follow up with me as needed with recurrent symptoms.       It was a pleasure to meet with you today in clinic.  Please do not hesitate to call the [facility] clinic with any questions or concerns at [phone].    Sincerely,     Cricket Rosario MD  Non-invasive Cardiology  [facility]    "

## 2021-06-02 VITALS — BODY MASS INDEX: 24.4 KG/M2 | WEIGHT: 146.6 LBS

## 2021-06-02 VITALS — HEIGHT: 65 IN | BODY MASS INDEX: 24.16 KG/M2 | WEIGHT: 145 LBS

## 2021-06-02 VITALS — HEIGHT: 65 IN | WEIGHT: 145.5 LBS | BODY MASS INDEX: 24.24 KG/M2

## 2021-06-02 VITALS — WEIGHT: 145.6 LBS | BODY MASS INDEX: 24.23 KG/M2

## 2021-06-02 VITALS — HEIGHT: 65 IN | BODY MASS INDEX: 24.32 KG/M2 | WEIGHT: 146 LBS

## 2021-06-04 VITALS
RESPIRATION RATE: 14 BRPM | TEMPERATURE: 98 F | WEIGHT: 141 LBS | DIASTOLIC BLOOD PRESSURE: 66 MMHG | OXYGEN SATURATION: 100 % | SYSTOLIC BLOOD PRESSURE: 114 MMHG | BODY MASS INDEX: 23.49 KG/M2 | HEIGHT: 65 IN | HEART RATE: 84 BPM

## 2021-06-04 VITALS
RESPIRATION RATE: 20 BRPM | BODY MASS INDEX: 23.66 KG/M2 | OXYGEN SATURATION: 100 % | TEMPERATURE: 98.5 F | SYSTOLIC BLOOD PRESSURE: 108 MMHG | HEART RATE: 68 BPM | DIASTOLIC BLOOD PRESSURE: 68 MMHG | HEIGHT: 65 IN | WEIGHT: 142 LBS

## 2021-06-04 VITALS
OXYGEN SATURATION: 100 % | TEMPERATURE: 98.3 F | HEART RATE: 73 BPM | SYSTOLIC BLOOD PRESSURE: 124 MMHG | RESPIRATION RATE: 16 BRPM | WEIGHT: 143 LBS | BODY MASS INDEX: 23.8 KG/M2 | DIASTOLIC BLOOD PRESSURE: 86 MMHG

## 2021-06-04 VITALS
TEMPERATURE: 98.2 F | WEIGHT: 143 LBS | BODY MASS INDEX: 23.82 KG/M2 | RESPIRATION RATE: 20 BRPM | SYSTOLIC BLOOD PRESSURE: 132 MMHG | HEART RATE: 75 BPM | DIASTOLIC BLOOD PRESSURE: 80 MMHG | HEIGHT: 65 IN | OXYGEN SATURATION: 100 %

## 2021-06-04 VITALS
HEIGHT: 65 IN | BODY MASS INDEX: 23.19 KG/M2 | WEIGHT: 139.19 LBS | DIASTOLIC BLOOD PRESSURE: 60 MMHG | SYSTOLIC BLOOD PRESSURE: 110 MMHG

## 2021-06-04 VITALS
OXYGEN SATURATION: 100 % | SYSTOLIC BLOOD PRESSURE: 130 MMHG | HEART RATE: 70 BPM | WEIGHT: 139 LBS | BODY MASS INDEX: 23.13 KG/M2 | DIASTOLIC BLOOD PRESSURE: 84 MMHG

## 2021-06-05 VITALS
SYSTOLIC BLOOD PRESSURE: 108 MMHG | HEIGHT: 65 IN | DIASTOLIC BLOOD PRESSURE: 80 MMHG | WEIGHT: 143 LBS | RESPIRATION RATE: 22 BRPM | TEMPERATURE: 98.1 F | BODY MASS INDEX: 23.82 KG/M2 | HEART RATE: 72 BPM | OXYGEN SATURATION: 100 %

## 2021-06-05 NOTE — PROGRESS NOTES
Internal Medicine Office Visit  [facility]  Patient Name: Alicia Reyes  Patient Age: 24 y.o.  YOB: 1995  MRN: [id number]    Date of Visit: 2020  Reason for Office Visit:   Chief Complaint   Patient presents with     Bleeding     Started , sharp pain in abomine, started to get acne           Assessment / Plan / Medical Decision Makin. Vaginal irritation  - Wet Prep, Vaginal was negative follow-up as needed    2. Irregular menstrual bleeding  Discussed with patient her irregular menstrual bleeding was most likely related to her skipping a oral contraceptive but given that the bleeding has turned from red to brown and is tapering off as well as a pelvic discomfort would recommend watchful waiting certainly if something worsens and pelvic discomfort was suddenly to increase would recommend a pelvic ultrasound at that time.    Patient advised if symptoms persist, worsen or new symptoms arise they are to seek medical care.  All patients questions addressed. Patient verbalized understanding and agreement with plan.         Orders Placed This Encounter   Procedures     Wet Prep, Vaginal   Followup: No follow-ups on file. earlier if needed.    Health Maintenance Review  Health Maintenance   Topic Date Due     PREVENTIVE CARE VISIT  1993     HPV VACCINES (1 - Female 2-dose series) 2004     HIV SCREENING  2008     ADVANCE CARE PLANNING  2011     PAP SMEAR  2014     INFLUENZA VACCINE RULE BASED (1) 2019     TD 18+ HE  2026     TDAP ADULT ONE TIME DOSE  Completed         I am having Alicia Reyes maintain her norgestimate-ethinyl estradiol, cetirizine, and fluticasone propionate.      HPI:  Alicia Reyes is a 24 y.o. year old who presents to the office today With some lower pelvic discomfort over the last 5 days.  She is on oral contraceptives she states that she did forget to take the pill 1 day.  She states  that she has been having bleeding since  though now has tapered off to more brown discharge.  She states she is on week 3 of the pill and she denied premenstrually until next week.  She reports that she underwent a clinic performed  in 2019 and was had no complications post procedure.          Review of Systems- pertinent positive in bold:  Constitutional: Fever, chills, night sweats, fainting, weight change, fatigue, dizziness, sleeping difficulties, loud snoring/pauses in breathing  Eyes: change in vision, blurred or double vision, redness/eye pain  Ears, nose, mouth, throat: change in hearing, ear pain, hoarseness, difficulty swallowing, sores in the mouth or throat  Respiratory: shortness of breath, cough, bloody sputum, wheezing  Cardiovascular: chest pain, palpitations   Gastrointestinal: abdominal pain, heartburn/indigestion, nausea/vomiting, change in appetite, change in bowel habits, constipation or diarrhea, rectal bleeding/dark stools, difficulty swallowing  Urinary: painful urination, frequent urination, urinary urgency/incontinence, blood in urine/dark urine, nocturia (new or increasing), muscle cramps, swelling of hands, feet, ankles, leg pain/redness  Skin: change in moles/freckles, rash, nodules  Hematologic/lymphatic: swollen lymph glands, abnormal bruising/bleeding  Endocrine: excessive thirst/urination, cold or heat intolerance  Neurologic/emotional: worrisome memory change, numbness/tingling, anxiety, mood swings      Current Scheduled Meds:  Outpatient Encounter Medications as of 2020   Medication Sig Dispense Refill     cetirizine (ZYRTEC) 10 MG tablet Take 1 tablet (10 mg total) by mouth daily. (Patient taking differently: Take 10 mg by mouth as needed.       ) 30 tablet 2     fluticasone propionate (FLONASE ALLERGY RELIEF) 50 mcg/actuation nasal spray 1-2 sprays in each nostril at bedtime. (Patient taking differently: 1-2 sprays in each nostril at bedtime as  "needed      ) 16 g 0     norgestimate-ethinyl estradiol (ORTHO TRI-CYCLEN LO, 28,) 0.18/0.215/0.25 mg-25 mcg Tab tablet Take 1 tablet by mouth daily. 30 tablet 11     No facility-administered encounter medications on file as of 1/24/2020.      No past medical history on file.  Past Surgical History:   Procedure Laterality Date     WISDOM TOOTH EXTRACTION       Social History     Tobacco Use     Smoking status: Never Smoker     Smokeless tobacco: Never Used   Substance Use Topics     Alcohol use: Yes     Comment: Social      Drug use: Never     Family History   Problem Relation Age of Onset     Lung disease Mother      Osteoporosis Mother      Heart disease Father      GI problems Sister      Allergy (severe) Sister      Lung cancer Maternal Grandmother      Cervical cancer Maternal Grandfather      Social History     Social History Narrative     Not on file       Objective / Physical Examination:  Vitals:    01/24/20 0917   BP: 110/60   Patient Site: Right Arm   Patient Position: Sitting   Cuff Size: Adult Regular   Weight: 139 lb 3 oz (63.1 kg)   Height: 5' 5\" (1.651 m)     Wt Readings from Last 3 Encounters:   01/24/20 139 lb 3 oz (63.1 kg)   05/24/19 146 lb (66.2 kg)   05/15/19 145 lb (65.8 kg)     Body mass index is 23.16 kg/m .     General Appearance: Alert and oriented, cooperative, affect appropriate, speech clear, in no apparent distress  Head: Normocephalic, atraumatic  Eyes:   Conjunctivae clear and sclerae non-icteric  Nose: Septum midline, nares patent,  mucosa moist and without drainage  Throat: Lips and mucosa moist.   Lungs:  Normal inspiratory and expiratory effort  : External appearance normal she has a small amount of thin white discharge wet prep is obtained.  Cervix is visualized mild erythema and irritation is noted.  Bimanual exam patient noted to have some lower pelvic discomfort no adnexal mass appreciated  Neuro: Alert and oriented, follows commands appropriately.     No results " found.  Recent Results (from the past 240 hour(s))   Wet Prep, Vaginal   Result Value Ref Range    Yeast Result No yeast seen No yeast seen    Trichomonas No Trichomonas seen No Trichomonas seen    Clue Cells, Wet Prep No Clue cells seen No Clue cells seen           Annabelle Olvera, CNP

## 2021-06-06 NOTE — TELEPHONE ENCOUNTER
Triage call: .    Productive cough for the past 2 weeks- no chest discomfort when not coughing     No fever     Yellow to clear mucous   No wheezing or asthma     Able to exercise with this cough   Not coughing up blood     Not pregnant     No severe coughing spells recently     Triaged to continue home care at this time - reviewed additional care advice with patient and she verbalizes understanding. Advised on when to call back for additional symptoms or concerns- patient agreeable to plans    Beverley Emery RN BSBA [facility] Triage/Med Refill 3/11/2020 9:40 AM    Reason for Disposition    Cough with no complications    Protocols used: COUGH-A-OH

## 2021-06-06 NOTE — PROGRESS NOTES
Internal Medicine Office Visit  [facility]  Patient Name: Alicia Reyes  Patient Age: 24 y.o.  YOB: 1995  MRN: [id number]    Date of Visit: 3/12/2020  Reason for Office Visit:   Chief Complaint   Patient presents with     Cough     x 3 weeks. Coughing up phlegm. No fevers. SOB. Wheezing a couple times, not constant.            Assessment / Plan / Medical Decision Makin. Acute sinusitis with symptoms > 10 days  - amoxicillin-clavulanate (AUGMENTIN) 875-125 mg per tablet; Take 1 tablet by mouth 2 (two) times a day for 10 days.  Dispense: 20 tablet; Refill: 0    2. Acute bronchitis with symptoms > 10 days  - benzonatate (TESSALON PERLES) 100 MG capsule; Take 1 capsule (100 mg total) by mouth 3 (three) times a day as needed for cough.  Dispense: 30 capsule; Refill: 0    Patient advised if symptoms persist, worsen or new symptoms arise they are to seek medical care.  All patients questions addressed. Patient verbalized understanding and agreement with plan.     No orders of the defined types were placed in this encounter.  Followup: Return in about 1 week (around 3/19/2020). earlier if needed.    Health Maintenance Review  Health Maintenance   Topic Date Due     PREVENTIVE CARE VISIT  1993     HPV VACCINES (1 - Female 2-dose series) 2004     HIV SCREENING  2008     ADVANCE CARE PLANNING  2011     PAP SMEAR  2014     INFLUENZA VACCINE RULE BASED (1) 2019     TD 18+ HE  2026     TDAP ADULT ONE TIME DOSE  Completed         I am having Alicia Reyes start on amoxicillin-clavulanate and benzonatate. I am also having her maintain her norgestimate-ethinyl estradioL, cetirizine, and fluticasone propionate.      HPI:  Alicia Reyes is a 24 y.o. year old who presents to the office today with a chief concern for 3-week history of sinus pressure congestion headache, cough intermittent wheeze shortness of breath with exertion  without chest pain chest pressure heart palpitations.  Patient denies any fever chills or diaphoresis.  She is been utilizing over-the-counter Mucinex has not found relief of symptoms.  She denies any recent contact with any known ill individuals and no recent travel.          Review of Systems- pertinent positive in bold:  Constitutional: Fever, chills, night sweats, fainting, weight change, fatigue, dizziness, sleeping difficulties, loud snoring/pauses in breathing  Eyes: change in vision, blurred or double vision, redness/eye pain  Ears, nose, mouth, throat: change in hearing, ear pain, hoarseness, difficulty swallowing, sores in the mouth or throat  Respiratory: shortness of breath, cough, bloody sputum, wheezing  Cardiovascular: chest pain, palpitations   Gastrointestinal: abdominal pain, heartburn/indigestion, nausea/vomiting, change in appetite, change in bowel habits, constipation or diarrhea, rectal bleeding/dark stools, difficulty swallowing  Urinary: painful urination, frequent urination, urinary urgency/incontinence, blood in urine/dark urine, nocturia (new or increasing), muscle cramps, swelling of hands, feet, ankles, leg pain/redness  Skin: change in moles/freckles, rash, nodules  Hematologic/lymphatic: swollen lymph glands, abnormal bruising/bleeding  Endocrine: excessive thirst/urination, cold or heat intolerance  Neurologic/emotional: worrisome memory change, numbness/tingling, anxiety, mood swings      Current Scheduled Meds:  Outpatient Encounter Medications as of 3/12/2020   Medication Sig Dispense Refill     cetirizine (ZYRTEC) 10 MG tablet Take 1 tablet (10 mg total) by mouth daily. (Patient taking differently: Take 10 mg by mouth as needed.       ) 30 tablet 2     fluticasone propionate (FLONASE ALLERGY RELIEF) 50 mcg/actuation nasal spray 1-2 sprays in each nostril at bedtime. (Patient taking differently: 1-2 sprays in each nostril at bedtime as needed      ) 16 g 0     norgestimate-ethinyl  "estradiol (ORTHO TRI-CYCLEN LO, 28,) 0.18/0.215/0.25 mg-25 mcg Tab tablet Take 1 tablet by mouth daily. 30 tablet 11     amoxicillin-clavulanate (AUGMENTIN) 875-125 mg per tablet Take 1 tablet by mouth 2 (two) times a day for 10 days. 20 tablet 0     benzonatate (TESSALON PERLES) 100 MG capsule Take 1 capsule (100 mg total) by mouth 3 (three) times a day as needed for cough. 30 capsule 0     No facility-administered encounter medications on file as of 3/12/2020.      No past medical history on file.  Past Surgical History:   Procedure Laterality Date     WISDOM TOOTH EXTRACTION       Social History     Tobacco Use     Smoking status: Never Smoker     Smokeless tobacco: Never Used   Substance Use Topics     Alcohol use: Yes     Comment: Social      Drug use: Never     Family History   Problem Relation Age of Onset     Lung disease Mother      Osteoporosis Mother      Heart disease Father      GI problems Sister      Allergy (severe) Sister      Lung cancer Maternal Grandmother      Cervical cancer Maternal Grandfather      Social History     Social History Narrative     Not on file       Objective / Physical Examination:  Vitals:    03/12/20 0653   BP: 108/68   Pulse: 68   Resp: 20   Temp: 98.5  F (36.9  C)   SpO2: 100%   Weight: 142 lb (64.4 kg)   Height: 5' 5\" (1.651 m)     Wt Readings from Last 3 Encounters:   03/12/20 142 lb (64.4 kg)   01/24/20 139 lb 3 oz (63.1 kg)   05/24/19 146 lb (66.2 kg)     Body mass index is 23.63 kg/m .     General Appearance: Alert and oriented, cooperative, affect appropriate, speech clear, in no apparent distress  Head: Normocephalic, atraumatic  Ears: Tympanic membrane clear with landmarks well visualized bilaterally  Eyes: PERRL,  Conjunctivae clear and sclerae non-icteric  Nose: Septum midline, moderate congestion, bogginess and purulent drainage discomfort upon palpation of maxillary sinus bilateral  Throat: Lips and mucosa moist. Teeth in good repair, pharynx without erythema or " exudate, postnasal drainage noted  Neck: Supple, trachea midline. No cervical adenopathy  Lungs: Clear to auscultation bilaterally. No adventitious lung sounds noted. Normal inspiratory and expiratory effort  Cardiovascular: Regular rate, normal S1, S2. No murmurs, rubs, or gallops  Integumentary: Warm and dry.   Neuro: Alert and oriented, follows commands appropriately.         Annabelle Olvrea, CNP

## 2021-06-08 NOTE — TELEPHONE ENCOUNTER
More Detail >>   Other    Alicia Reyes    Sent:    2:04 PM    To:  [facility] [place]     Alicia Reyes    MRN:  [id number]  :  1995    Pt Home:  [phone]         Message     ----- Message from Xochitl Oglesby PA-C sent at 2020  2:04 PM CDT -----    Needs to be seen face to face in office or UC or ER if chest discomfort is worsening        ----- Message from Alicia Reyes to Xochitl Oglesby PA-C sent at 2020 10:01 AM -----    Other    --------------------------------       Question: Before we get started...    Answer:         Question: If you are charged for this eVisit, would you like us to bill your insurance or charge the credit card you authorized for the full amount?    Answer:   Please submit any charges to my insurance first.       Question: Please describe your symptoms.    Answer:   Episode of disorientation and light-headedness on . Likely a panic attack followed which led to numb hands, weak extremities and waves of light-headedness. At ER visit, EKG was done which was normal. Doctor mentioned blood clot but did not do work-up due to lack of supporting symptoms. I am still experiencing periodic waves of weakness and mild light-headedness. Not sure if it could be just anxiety-related. Additionally, I have had periodic waves of pain/discomfort in my left shoulder radiating to my elbow and wrist for the past year or two that has progressively gotten worse and more frequent and has moved to the left side of my neck. I have also developed increasing pain and discomfort in my chest, left breast, and left armpit. The past 3.5 weeks I have had a couple of swollen lymph nodes on the right side of my neck and in front of my right ear.       Question: Have you had these symptoms before?    Answer:   Yes       Question: How long have you been having these symptoms?    Answer:   For more than a month       Question: Please list any medications you  are currently taking for this condition.    Answer:         Question: Please describe any probable cause for these symptoms.    Answer:   I have typically drank 1-2 cups of coffee each morning, have since switched to decaf. I recently (about 1.5 weeks ago) stopped taking my birth control and am currently experiencing what I think is withdrawal bleeding. I tried taking ZyrTEC for a few days for the swollen nodes, nut they still seem somewhat enlarged       Question: Wrap up    Answer:         Question: Anything else you would like to add?    Answer:         Question: Are you pregnant or breastfeeding?    Answer:   I am confident that I am neither    Patient Review of Clinical Information     Problems    The patient or proxy has reviewed this information, and the following was reported:    Requested Problem Additions  Date Noted  Reported By   Comments    Panic attack  6/4/2020  Alicia Reyes  panic attack after episode of disorientation and light-headedness. Went to ER due to fear of passing out. EKG was normal and oxygen level was fine.    Tenderness of chest wall   Alicia R Amy  mostly behind and surrounding left breast    Pain in axilla, left   Alicia R Amy  increasing discomfort and sharp pains in left armpit.    Pain in joint, upper arm, left   Alicia R Amy  waves of pain and discomfort in left shoulder and neck going down to elbow    Medications    The patient or proxy has reviewed this information, and the following was reported:    Requested Medication Removals  Start Date  Reported By   Comments    benzonatate 100 MG capsule  3/12/2020  Alicia Reyes  completed course    Allergies    The patient or proxy has reviewed this information.    Patient Preferred Pharmacy     [address]    Current view: Showing all answers  Show Only Relevant Answers    Legend:         Triggered a BPA  Scoring question

## 2021-06-08 NOTE — PROGRESS NOTES
D-dimer is negative, this is the clotting test, please call results to the patient or send a letter if not reachable by phone.

## 2021-06-08 NOTE — PROGRESS NOTES
"ERFollow-up Visit:    Assessment/Plan:     1. Sinus tachycardia  propranoloL (INDERAL) 20 MG tablet    Thyroid Cascade    HM2(CBC w/o Differential)   2. Anxiety  propranoloL (INDERAL) 20 MG tablet    Thyroid Cascade    HM2(CBC w/o Differential)   3. UNGER (dyspnea on exertion)  Stress Test Graded    Troponin I    D-dimer, Quantitative    CANCELED: CTA Chest PE Run   4. Breast pain, left  US Breast Left Limited 1-3 Quadrants     1. - 3. Discussed the use of propranolol for her tachycardia and her anxiety.  We will start this at low-dose 20 mg twice daily.  She is advised to use my chart to discuss her pulse with activity.  If she has any concerns of low pulse or returning symptoms should return for follow-up.  Review of work-up for her previous episodes of chest discomfort showed that she had had an echocardiogram, EKG, Holter monitor.  We will get graded stress test since she now also has some dyspnea with exertion.  She has discontinued her birth control pill and will stay off of this at this time, if d-dimer is negative her risk of possible PE is very low.  Discussed this with her and she is comfortable with the current plan of stress test and propranolol.  4.  Left upper outer quadrant tenderness on breast area.  No palpable mass.  Will get ultrasound.  We will call to schedule.         Subjective:     Alicia Reyes is a 24 y.o. female who presents for a hospital discharge follow up.  Chief Complaint   Patient presents with     Follow-up     Patient reports an period where she \"blacked out\".  6/4/20 ED visit, anxiety      Episode of disorientation and light-headedness on 6/4. Likely a panic attack followed which led to numb hands, weak extremities and waves of light-headedness. At ER visit, EKG was done which was normal. Doctor mentioned blood clot but did not do work-up due to lack of supporting symptoms. I am still experiencing periodic waves of weakness and mild light-headedness. Not sure if it could be just " anxiety-related. Additionally, I have had periodic waves of pain/discomfort in my left shoulder radiating to my elbow and wrist for the past year or two that has progressively gotten worse and more frequent and has moved to the left side of my neck. I have also developed increasing pain and discomfort in my chest, left breast, and left armpit. The past 3.5 weeks I have had a couple of swollen lymph nodes on the right side of my neck and in front of my right ear.   ER -  Alicia Reyes is a 24 year old female who presents to the ER for evaluation after having multiple near syncopal spells today. She has had one episode of syncope approximately 8 years ago in her college dorm shower, but at that time she was sick. No subsequent syncopal spells, but she has had recurrent spells of palpitations. These typically happen when she is driving. She has been evaluated with EKG and Holter monitor studies, both were unrevealing. She has noticed that these episodes are occurring more frequently. She works for [employer] and has been working from home since March. Today she was on a video conference call with a patient when she had an episode of feeling woozy, flushed and mildly lightheaded. She states that she had trouble concentrating. She states that her hearing seemed echo-y to her and she felt if she was out of her own body. She also felt like she was having hot flashes, and developed bilateral numbness in her hands. Her conference call ended but she could not recall how the call ended. Afterwards she noticed multiple episodes of feeling near syncope and so presents for evaluation. She notes that her heart did feel like it was racing at the time of these spells. She denies any chest discomfort. She endorses mild shortness of breath at this time. No ankle swelling. No recent cough or cold. She states that the last time she was sick was in February with a cough. She has noticed some swollen lymph nodes right side of neck and jaw  for past few weeks, thought it was due to seasonal allergies and has been taking Zyrtec for past 5 days. She does take her temperature once a day, no fevers. Her last menses was 1 week and a half ago, doesn't think she's pregnant. She did stop oral contraceptives a few days ago.   Results   [facility] - Today (Order [id number])   [facility] - Today   Order: [id number]   Status:  Final result   Visible to patient:  Yes (MyChart) Next appt:  None Dx:  Chest pain, unspecified type; Dizzine...     Ref Range & Units  19 0951     SYSTOLIC BLOOD PRESSURE  mmHg       DIASTOLIC BLOOD PRESSURE  mmHg       VENTRICULAR RATE  BPM  66       ATRIAL RATE  BPM  66       P-R INTERVAL  ms  150       QRS DURATION  ms  84       Q-T INTERVAL  ms  396       QTC CALCULATION (BEZET)  ms  415       P Axis  degrees  34       R AXIS  degrees  77       T AXIS  degrees  44       MUSE DIAGNOSIS   Normal sinus rhythm with sinus arrhythmia   Normal ECG   No previous ECGs available   Confirmed by SAGAR DIAZ MD LOC: ([id number]) on 2019 4:46:11 PM            Echo Complete   Order# [id number]   Reading physician: Lissett Dill MD  Ordering physician: Xochitl Oglesby PA-C  Study date: 5/15/19    Performing Date  Performing Department    May 15, 2019   CARDIAC TESTING [[id number]]    Patient Information     Patient Name   Alicia Reyes  MRN   [id number]  Sex   Female   1   1995 (23 y.o.)    Indications     Dx: Near syncope [R55 (ICD-10-CM)]    Summary       Left ventricle ejection fraction is normal. The calculated left ventricular ejection fraction is 73%.    Normal right ventricular size and systolic function.    No significant valvular heart disease    No previous study for comparison.      Measurements     Height:  65 in        Weight:  145 lbs        BSA:  1.73 m2          HR:  76 bpm        BP:  118/83 mmHg           Technical Details     Technical Quality  Sonographer:  FABRICE  Technical Quality: adequate visualization     Findings     Left Ventricle  Normal left ventricular size.The calculated left ventricular ejection fraction is 73%.   This represents a normal ejection fraction. No wall motion abnormality. The left ventricular wall thickness is normal.    Right Ventricle  Normal right ventricular size. TAPSE is normal, which is consistent with normal right ventricular systolic function.    Left Atrium  Left atrium of normal size.  There is no atrial septal defect.    Right Atrium  Right atrium of normal size.    IVC  Normal central venous pressure.  Estimated central venous pressure equal to 3 mmHg.    Aortic Valve  Normal aortic valve structure and function.    Mitral Valve  Normal mitral valve structure.  Trace mitral regurgitation.    Tricuspid Valve  Normal tricuspid valve structure.  Trace tricuspid valve regurgitation.    Pulmonic Valve  Normal pulmonic valve structure. Trace pulmonic regurgitation.    Thoracic Aorta  Normal caliber of the visualized aorta.  Normal sized aortic root.  No aneurysm present.    Pericardium  No pericardial effusion.    Left Ventricle Measurements and Calculations     Echo LVEF calculated:  73 % (Range: 55 - 75)        IVS/PW ratio:  1.1        IVSd:  0.715 cm (Range: 0.6 - 0.9)          LV CO:  3.4 l/min        LV Ci:  2.0 l/min/m2        LV FS:  45.8 % (Range: 28 - 44)          LV PWd:  0.646 cm (Range: 0.6 - 0.9)        LV SVi:  26.2 ml/m2        LV diastolic volume index:  34.8 cm3/m2 (Range: 29 - 61)          LV mass:  81.0 g        LV mass index:  46.8 g/m2        LV systolic volume index:  9.4 cm3/m2 (Range: 8 - 24)          LV volume diastolic:  60.2 cm3 (Range: 46 - 106)        LV volume systolic:  16.3 cm3 (Range: 14 - 42)        LVIDd:  4.17 cm (Range: 3.8 - 5.2)          LVIDs:  2.26 cm (Range: 2.2 - 3.5)        LVOT SV:  45.3 cm3              Diastolic Filling     MV Avg E/e' Ratio:  5.2 cm/s        MV E' lat robin:  20 cm/s        MV E' med robin:  13.8 cm/s          MV lat E/e' ratio:  " 4.4        MV med E/e' ratio:  6.3              Shunt Ratio     LVOT SV:  45.3 cm3               Left Atrium Measurements and Calculcations     LA size:  3.1 cm        LA/AO root ratio:  1.11 no units                      Monitoring started at 2:15 PM and continued for 23 hr 59 min. The average heart rate was 76 BPM. The minimum heart  rate was 50 BPM, occurring at 2:16:12 AM. The maximum heart rate was 141 BPM, occurring at 5:57:15 PM.  The patient's rhythm included 3 hr 14 min 28 sec of bradycardia. The slowest single episode of bradycardia occurred at  2:14:53 AM, lasting 1 min 53 sec, with minimum heart rate of 50 BPM.  The patient's rhythm included 25 min 6 sec of tachycardia. The fastest single episode of tachycardia occurred at 5:56:25  PM, lasting 1 min 44 sec, with maximum heart rate of 141 BPM.  Supraventricular ectopic activity consisted of 4 beats, of which, 4 were single PACs. The longest R-R interval was 1.4  seconds occurring at 12:30:25 AM. The longest N-N interval was 1.4 seconds occurring at 12:30:25 AM.      Hospital/Nursing Home/IP Rehab Facility: [facility], Emergency Department  Date of Admission: 6/4/2020  Date of Discharge:6/4/2020  Reason(s) for Admission:\"Patient reports an period where she \"blacked out\". She was on a video conference call and was in the middle of the call when she reports she was suddenly finished with the call and unsure how the remainder of the call went. She then experienced a sudden onset of bilateral numbness in hands and shortness of breath. Her hands are still numb, but sensation is improving. \"              Do you have any problems taking your medication regularly?  Patient has stopped her birth control just for this month        Have you had any changes in your medication since discharge? None       Have you had any difficulty following your discharge or treatment plan?  No    Summary of hospitalization:  ER only  Diagnostic Tests/Treatments reviewed.  Follow " up needed: None  Other Healthcare Providers Involved in Patient's Care: Patient Care Team:  Xochitl Oglesby PA-C as PCP - General (Physician Assistant)  Annabelle Olvera CNP as Assigned PCP      Update since discharge: {no change   Information from family, SNF, care coordination: none     Post Discharge Medication Reconciliation: discharge medications reconciled and changed, per note/orders (see AVS)  Plan of care communicated with: patient    Objective:     Vitals:    06/09/20 0948   BP: 130/84   Pulse: 70   SpO2: 100%   Weight: 139 lb (63 kg)         Physical Exam:  Alert, cooperative, well-hydrated.  Appears well.  Eyes: Pupils equal, round, reactive to light.  HEENT: Sclera white, nares patent, MMM, no carotid bruits bilaterally, neck supple, no adenopathy, thyroid is without organomegaly, freely movable, no nodules.  No JVD when supine.  Lungs: Clear to auscultation. No retractions, no increased work of respiration, equal chest rise.   Heart: Regular rate and rhythm, no murmurs, clicks,  rubs or  Gallops.  Pulses 2 out of 4 all 4 extremities, no edema in lower extremities.  Abdomen: Soft, bowel sounds in 4 quadrants with no tenderness to palpation, no organomegaly or masses, no aortic or renal bruits.  Extremities: no tenderness to palpation of gastrocnemius, bilaterally.  Skin: no increased warmth, edema, or erythema of lower legs bilaterally.  Left breast: area of breast upper outer quadrant: no palpable masses in limited exam area, no erythema or edema and no axillary adenopathy.      Coding guidelines for this visit:  Type of Medical   Decision Making Face-to-Face Visit       within 7 Days of discharge Face-to-Face Visit        within 14 days of discharge   Moderate Complexity [id number]   High Complexity [id number]       Electronically signed by Xochitl Oglesby PA-C 06/09/20 9:50 AM

## 2021-06-09 NOTE — TELEPHONE ENCOUNTER
LMTCB -- Patient is scheduled for a follow up on Monday July 6th, PCP is doing video and telephone visits only on Monday so please schedule patient or change patient appointment to a virtual for Monday or if patient wants to be seen you can schedule a in person OV for another day

## 2021-06-10 NOTE — PROGRESS NOTES
Assessment/Plan:     1. Screening for malignant neoplasm of cervix  - Gynecologic Cytology (PAP Smear)    2. Fatigue, unspecified type  - HM1(CBC and Differential)  - Comprehensive Metabolic Panel  - Vitamin D, Total (25-Hydroxy)  - Thyroid Cascade  - HM1 (CBC with Diff)    3. Gastroesophageal reflux disease, esophagitis presence not specified  - omeprazole (PRILOSEC) 40 MG capsule; Take 1 capsule (40 mg total) by mouth daily.  Dispense: 60 capsule; Refill: 1    4. Panic attack  - LORazepam (ATIVAN) 1 MG tablet; Take 0.5-1 tablets (0.5-1 mg total) by mouth 2 (two) times a day as needed for anxiety.  Dispense: 10 tablet; Refill: 0    5. Urinary frequency  - Urinalysis-UC if Indicated          Subjective:     Alicia Reyes is a 25 y.o. female who presents for an annual exam.     Sulfite allergy   The patient reports that there is not domestic violence in her life.     Healthy Habits:   Regular Exercise: Yes  Sunscreen Use: Yes  Healthy Diet: Yes  Dental Visits Regularly: Yes  Sexually active: Yes  Mammogram:   Colonoscopy: N/A  Prevention of Osteoporosis: Yes  Last Dexa: N/A    Immunization History   Administered Date(s) Administered     Hep A, Adult IM (19yr & older) 2018     INFLUENZA,SEASONAL QUAD, PF, =/> 6months 2016     Influenza, inj, historic,unspecified 2018     Influenza,seasonal,quad inj =/> 6months 10/03/2017     Tdap 2016     Varicella 2016     Immunization status: up to date and documented.    Gynecologic History  Patient's last menstrual period was 2020 (exact date).  Contraception: condoms  Last Pap: today.   Last mammogram: N/A.     OB History    Para Term  AB Living   1             SAB TAB Ectopic Multiple Live Births                  # Outcome Date GA Lbr Nadir/2nd Weight Sex Delivery Anes PTL Lv   1                 Current Outpatient Medications   Medication Sig Dispense Refill     omeprazole (PRILOSEC OTC) 20 MG tablet Take 1 tablet (20 mg  total) by mouth daily. 28 tablet 1     propranoloL (INDERAL) 20 MG tablet Take 1 tablet (20 mg total) by mouth 2 (two) times a day. 90 tablet 3     LORazepam (ATIVAN) 1 MG tablet Take 0.5-1 tablets (0.5-1 mg total) by mouth 2 (two) times a day as needed for anxiety. 10 tablet 0     omeprazole (PRILOSEC) 40 MG capsule Take 1 capsule (40 mg total) by mouth daily. 60 capsule 1     No current facility-administered medications for this visit.      No past medical history on file.  Past Surgical History:   Procedure Laterality Date     WISDOM TOOTH EXTRACTION       Patient has no known allergies.  Family History   Problem Relation Age of Onset     Lung disease Mother      Osteoporosis Mother      Heart disease Father      GI problems Sister      Allergy (severe) Sister      Lung cancer Maternal Grandmother      Cervical cancer Maternal Grandfather      Social History     Socioeconomic History     Marital status: Single     Spouse name: Not on file     Number of children: Not on file     Years of education: Not on file     Highest education level: Not on file   Occupational History     Not on file   Social Needs     Financial resource strain: Not on file     Food insecurity     Worry: Not on file     Inability: Not on file     Transportation needs     Medical: Not on file     Non-medical: Not on file   Tobacco Use     Smoking status: Never Smoker     Smokeless tobacco: Never Used   Substance and Sexual Activity     Alcohol use: Yes     Comment: Social      Drug use: Never     Sexual activity: Yes     Partners: Male   Lifestyle     Physical activity     Days per week: Not on file     Minutes per session: Not on file     Stress: Not on file   Relationships     Social connections     Talks on phone: Not on file     Gets together: Not on file     Attends Holiness service: Not on file     Active member of club or organization: Not on file     Attends meetings of clubs or organizations: Not on file     Relationship status: Not  "on file     Intimate partner violence     Fear of current or ex partner: Not on file     Emotionally abused: Not on file     Physically abused: Not on file     Forced sexual activity: Not on file   Other Topics Concern     Not on file   Social History Narrative     Not on file     Social History     Social History Narrative     Not on file       Review of Systems  General:  Negative except as noted above  Eyes: Negative except as noted above  Ears/Nose/Throat: Negative except as noted above  Cardiovascular: Negative except as noted above  Respiratory:  Negative except as noted above  Gastrointestinal:  Negative except as noted above  Musculoskeletal:  Negative except as noted above  Skin: Negative except as noted above  Neurologic: Negative except as noted above  Psychiatric: Negative except as noted above  Endocrine: Negative except as noted above  Heme/Lymphatic: Negative except as noted above   Allergic/Immunologic: Negative except as noted above      Objective:      Vitals:    08/19/20 1536   BP: 132/80   Pulse: 75   Resp: 20   Temp: 98.2  F (36.8  C)   SpO2: 100%   Weight: 143 lb (64.9 kg)   Height: 5' 5\" (1.651 m)     Wt Readings from Last 3 Encounters:   08/19/20 143 lb (64.9 kg)   07/09/20 141 lb (64 kg)   06/09/20 139 lb (63 kg)     Body mass index is 23.8 kg/m . (>25?)    Physical Exam:  General Appearance: Alert, cooperative, no distress.  Head: Normocephalic, without obvious abnormality, atraumatic  Eyes: PERRL, conjunctiva/corneas clear, EOM's intact  Ears: Normal TM's and external ear canals, both ears  Nose: Nares normal, septum midline,mucosa normal, no drainage  Throat: Lips, mucosa, and tongue normal  Neck: Supple, symmetrical, trachea midline, no adenopathy;  thyroid: not enlarged, symmetric, no tenderness/mass/nodules  Back: Symmetric, no curvature, ROM normal, no CVA tenderness  Lungs: Clear to auscultation bilaterally, respirations unlabored  Breasts: No breast masses, tenderness, asymmetry, or " nipple discharge.  Heart: Regular rate and rhythm, S1 and S2 normal, no murmur, rub, or gallop  Abdomen: Soft, non-tender, bowel sounds active all four quadrants,  no masses, no organomegaly  Pelvic: Genital: EXTERNAL GENITALIA: Normal appearing vulva without masses, tenderness or lesions. PERINEUM: normal and intact. URETHRAL MEATUS: normal VAGINA:  vagina with normal color and without discharge or lesions. CERVIX: normal appearing cervix without discharge or lesions, pap obtained. Non-friable. No CMT. UTERUS: uterus is normal size, shape, consistency, and non-tender. ADNEXA: no tenderness or fullness.   Extremities: Extremities normal, atraumatic, no cyanosis or edema  Skin: Skin color, texture, turgor normal, no rashes or lesions  Lymph nodes: Cervical, supraclavicular, and axillary nodes normal  Neurologic: Normal  Psych: Normal affect.  Does not appear anxious or depressed.       Little interest or pleasure in doing things: Not at all  Feeling down, depressed, or hopeless: Not at all  Trouble falling or staying asleep, or sleeping too much: Several days  Feeling tired or having little energy: Nearly every day  Poor appetite or overeating: Not at all  Feeling bad about yourself - or that you are a failure or have let yourself or your family down: Not at all  Trouble concentrating on things, such as reading the newspaper or watching television: Not at all  Moving or speaking so slowly that other people could have noticed. Or the opposite - being so fidgety or restless that you have been moving around a lot more than usual: Not at all  Thoughts that you would be better off dead, or of hurting yourself in some way: Not at all  PHQ-9 Total Score: 4  If you checked off any problems, how difficult have these problems made it for you to do your work, take care of things at home, or get along with other people?: Not difficult at all  No data recorded    MJ-7 Screening Results:  No data recorded

## 2021-06-11 NOTE — PROGRESS NOTES
"Alicia Reyes is a 25 y.o. female who is being evaluated via a billable video visit.      The patient has been notified of following:     \"This video visit will be conducted via a call between you and your physician/provider. We have found that certain health care needs can be provided without the need for an in-person physical exam.  This service lets us provide the care you need with a video conversation.  If a prescription is necessary we can send it directly to your pharmacy.  If lab work is needed we can place an order for that and you can then stop by our lab to have the test done at a later time.    Video visits are billed at different rates depending on your insurance coverage. Please reach out to your insurance provider with any questions.    If during the course of the call the physician/provider feels a video visit is not appropriate, you will not be charged for this service.\"    Patient has given verbal consent to a Video visit? Yes  How would you like to obtain your AVS? AVS Preference: [employer].  If dropped by the video visit, the video invitation should be sent to: Text to cell phone: [phone]  Will anyone else be joining your video visit? No    Little interest or pleasure in doing things: Not at all  Feeling down, depressed, or hopeless: Not at all  Trouble falling or staying asleep, or sleeping too much: Not at all  Feeling tired or having little energy: Several days  Poor appetite or overeating: Not at all  Feeling bad about yourself - or that you are a failure or have let yourself or your family down: Not at all  Trouble concentrating on things, such as reading the newspaper or watching television: Not at all  Moving or speaking so slowly that other people could have noticed. Or the opposite - being so fidgety or restless that you have been moving around a lot more than usual: Not at all  Thoughts that you would be better off dead, or of hurting yourself in some way: Not at all  PHQ-9 Total " Score: 1    Feeling nervous, anxious or on edge: 1 (9/3/2020  7:00 AM)  Not being able to stop or control worry: 1 (9/3/2020  7:00 AM)  Worrying too much about different things: 1 (9/3/2020  7:00 AM)  Trouble relaxin (9/3/2020  7:00 AM)  Being so restless that is is hard to sit still: 0 (9/3/2020  7:00 AM)  Becoming easily annnoyed or irritable: 0 (9/3/2020  7:00 AM)  Feeling afraid as if something awful might happen: 0 (9/3/2020  7:00 AM)  MJ-7 Total: 4 (9/3/2020  7:00 AM)          Video Start Time: 7:23am     Additional provider notes:     Internal Medicine Office Visit  [facility]  Patient Name: Alicia Reyes  Patient Age: 25 y.o.  YOB: 1995  MRN: [id number]    Date of Visit: 9/3/2020  Reason for Office Visit:   Chief Complaint   Patient presents with     Follow-up     Anxiety is good. Has not taken the xanax yet. Has started therapy and states that its going well. Has been seen twice. Still has swollen lymphnodes.            Assessment / Plan / Medical Decision Makin. Panic attack  Patient reports that she is going to try to utilize the Xanax at a time over the weekend did encourage her to try with a half a tablet may contact my office for follow-up    2. Fatigue, unspecified type  3. Lymphadenopathy  Patient's lab work all was within normal limits did recommend patient that we continue to monitor she will follow-up with my office in 3 weeks if she continues to have lymphadenopathy would recommend repeat CBC and a neck ultrasound    4. Gastroesophageal reflux disease, esophagitis presence not specified  Continue avoiding offending foods, continue omeprazole 40 mg daily.  Did advise patient that her symptoms of epigastric discomfort certainly relate to some underlying anxiety    No orders of the defined types were placed in this encounter.  Followup: Return in about 3 weeks (around 2020). earlier if needed.    Health Maintenance Review  [facility]  Maintenance   Topic Date Due     ADVANCE CARE PLANNING  07/13/2011     INFLUENZA VACCINE RULE BASED (1) 08/01/2020     PREVENTIVE CARE VISIT  08/19/2021     PAP SMEAR  08/19/2023     TD 18+ HE  08/18/2026     TDAP ADULT ONE TIME DOSE  Completed     HEPATITIS B VACCINES  Aged Out     HIV SCREENING  Discontinued         I am having Alicia Reyes maintain her propranoloL, omeprazole, omeprazole, and LORazepam.      HPI:  Alicia Reyes is a 25 y.o. year old who presents to the office today with chronic conditions including anxiety, GERD.  Patient had previously been prescribed some Ativan 0.5 to 1 mg twice daily as needed for panic attacks.  Patient reports that she is hesitant to take the medication has not trialed it.    She was previously seen and noted some cervical lymphadenopathy and mild fatigue lab work was completed was within normal limits and she was discouraged from massaging the lymph nodes.  She reports that she is continuing to try to do this notes some improvement though not complete relief.    Patient has diagnosis of GERD is continue on omeprazole 40 mg daily.  She reports the majority of the time the omeprazole is working well though at times will develop increase in discomfort.          Review of Systems- pertinent positive in bold:  Constitutional: Fever, chills, night sweats, fainting, weight change, fatigue, dizziness, sleeping difficulties, loud snoring/pauses in breathing  Eyes: change in vision, blurred or double vision, redness/eye pain  Ears, nose, mouth, throat: change in hearing, ear pain, hoarseness, difficulty swallowing, sores in the mouth or throat  Respiratory: shortness of breath, cough, bloody sputum, wheezing  Cardiovascular: chest pain, palpitations   Gastrointestinal: abdominal pain, heartburn/indigestion, nausea/vomiting, change in appetite, change in bowel habits, constipation or diarrhea, rectal bleeding/dark stools, difficulty swallowing  Urinary: painful urination,  frequent urination, urinary urgency/incontinence, blood in urine/dark urine, nocturia (new or increasing), muscle cramps, swelling of hands, feet, ankles, leg pain/redness  Skin: change in moles/freckles, rash, nodules  Hematologic/lymphatic: swollen lymph glands, abnormal bruising/bleeding  Endocrine: excessive thirst/urination, cold or heat intolerance  Neurologic/emotional: worrisome memory change, numbness/tingling, anxiety, mood swings      Current Scheduled Meds:  Outpatient Encounter Medications as of 9/3/2020   Medication Sig Dispense Refill     omeprazole (PRILOSEC) 40 MG capsule Take 1 capsule (40 mg total) by mouth daily. 60 capsule 1     propranoloL (INDERAL) 20 MG tablet Take 1 tablet (20 mg total) by mouth 2 (two) times a day. 90 tablet 3     LORazepam (ATIVAN) 1 MG tablet Take 0.5-1 tablets (0.5-1 mg total) by mouth 2 (two) times a day as needed for anxiety. 10 tablet 0     omeprazole (PRILOSEC OTC) 20 MG tablet Take 1 tablet (20 mg total) by mouth daily. 28 tablet 1     No facility-administered encounter medications on file as of 9/3/2020.      No past medical history on file.  Past Surgical History:   Procedure Laterality Date     WISDOM TOOTH EXTRACTION       Social History     Tobacco Use     Smoking status: Never Smoker     Smokeless tobacco: Never Used   Substance Use Topics     Alcohol use: Yes     Comment: Social      Drug use: Never     Family History   Problem Relation Age of Onset     Lung disease Mother      Osteoporosis Mother      Heart disease Father      GI problems Sister      Allergy (severe) Sister      Lung cancer Maternal Grandmother      Cervical cancer Maternal Grandfather      Social History     Social History Narrative     Not on file       Objective / Physical Examination:  There were no vitals filed for this visit.  Wt Readings from Last 3 Encounters:   08/19/20 143 lb (64.9 kg)   07/09/20 141 lb (64 kg)   06/09/20 139 lb (63 kg)     There is no height or weight on file to  calculate BMI.     GENERAL: Healthy, alert and no distress  EYES: Eyes grossly normal to inspection. No discharge or erythema, or obvious scleral/conjunctival abnormalities.  RESP: No audible wheeze, cough, or visible cyanosis.  No visible retractions or increased work of breathing.    NEURO: Cranial nerves grossly intact. Mentation and speech appropriate for age.  PSYCH: Mentation appears normal, affect normal/bright, judgement and insight intact, normal speech and appearance well-groomed    No results found.  No results found for this or any previous visit (from the past 240 hour(s)).        Annabelle Olvera CNP        Video-Visit Details    Type of service:  Video Visit    Video End Time (time video stopped): 7:39 AM  Originating Location (pt. Location): Home    Distant Location (provider location):  [facility]     Platform used for Video Visit: Jodi Olvera CNP

## 2021-06-16 PROBLEM — F41.0 PANIC ATTACK: Status: ACTIVE | Noted: 2020-06-04

## 2021-06-16 PROBLEM — R07.89 TENDERNESS OF CHEST WALL: Status: ACTIVE | Noted: 2020-06-09

## 2021-06-16 PROBLEM — M79.622 PAIN IN AXILLA, LEFT: Status: ACTIVE | Noted: 2020-06-09

## 2021-06-18 NOTE — PATIENT INSTRUCTIONS - HE
Patient Instructions by Sunday Clarke PA-C at 9/29/2020  4:00 PM     Author: Sunday Clarke PA-C Service: -- Author Type: Physician Assistant    Filed: 9/29/2020  4:58 PM Encounter Date: 9/29/2020 Status: Addendum    : Sunday Clarke PA-C (Physician Assistant)    Related Notes: Original Note by Sunday Clarke PA-C (Physician Assistant) filed at 9/29/2020  4:58 PM       Hot packs to the right lateral neck 3 times per day.  Take precautions to avoid damage to skin and do not fall asleep with hot packs.  Take antibiotic as written.  Follow-up with your primary care provider in 2 weeks for reevaluation and treatment and to ensure there is resolution of the lymph node swelling.      Patient Education     Lymphadenopathy  Lymphadenopathy is swelling of the lymph nodes. Lymph nodes are small, bean-shaped glands around the body.  What are lymph nodes?  Lymph nodes are part of your immune system. These glands are found in your neck, over your clavicle, armpits, groin, chest, and belly (abdomen). They act as filters for lymph fluid as it flows through your body. Lymph fluid contains white blood cells (lymphocytes) that help the body fight infection and disease.   Why lymph nodes swell  Lymphadenopathy is very common. The glands often get larger during a viral or bacterial infection. It can happen during a cold, the flu, or strep throat. The nodes may swell in just one area of the body, such as the neck (localized). Or nodes may swell all over the body (generalized). The neck (cervical) lymph nodes are the most common site of lymphadenopathy.  What causes lymphadenopathy?  Dead cells and fluid build up in the lymph nodes as they help fight infection or disease. This causes them to swell in size. Enlarged lymph nodes are often near the source of infection. This can help to find the cause of an infection. For example, swollen lymph nodes around the jaw may be because of an infection in the teeth or mouth. But lymphadenopathy  may also be generalized. This is common in some viral illnesses such as infectious mononucleosis, HIV, or chickenpox (varicella).  Lymphadenopathy can also be caused by:    Infection of a lymph node or small group of nodes (lymphadenitis)    Cancer    Reactions to medicines such as antibiotics and certain blood pressure, gout, and seizure medicines    Other health conditions, such as lupus or sarcoidosis  Symptoms of lymphadenopathy  Lymphadenopathy can cause symptoms such as:    Lumps under the jaw, on the sides or back of the neck, in the armpits, in the groin, or in the chest or belly (abdomen)    Pain or soreness in any of these areas    Redness or warmth in any of these areas  You may also have symptoms from an infection causing the swollen glands. These symptoms may include fever, sore throat, body aches, or cough.  Diagnosing lymphadenopathy  Your healthcare provider will ask about your health history and symptoms. He or she will give you a physical exam and check the areas where lymph nodes are enlarged. Your provider will check the size, texture, and location of the nodes. He or she will ask how long they have been swollen and if they are painful. You may be advised to have diagnostic tests and referral to specialists may be advised. The tests may include:    Blood tests. These are done to check for signs of infection and other problems.    Urine test. This is also done to check for infection and other problems.    Chest X-ray, ultrasound, CT scan, or MRI scans. These tests can show enlarged lymph nodes or other problems.    Lymph node biopsy. The cause of enlarged lymph nodes may be checked with a biopsy. Small samples of lymph node tissue are taken and checked in a lab for signs of infection, cancer, and other causes. You may be referred to other specialists for their opinion as well.  Treatment for lymphadenopathy  The treatment of enlarged lymph nodes depends on the cause. Enlarged lymph nodes are often  harmless and go away without any treatment. Treatment is most often done on the cause of the enlarged nodes and may include:    Antibiotic or antiviral medicine to treat a bacterial or viral infection    Incision and drainage of a lymph node for lymphadenitis    Other medicines or procedures to treat the cause of the enlarged nodes  You may need a follow-up exam in 3 to 4 weeks to recheck enlarged nodes.  When to call your healthcare provider  Call your healthcare provider if:    Your symptoms get worse    You have new symptoms    You have a fever of 100.4 F (38 C) or higher, or as directed by your provider    Your lymph nodes that are still swollen after 3 to 4 weeks    Date Last Reviewed: 6/1/2019 2000-2019 [employer]. [address] [place] [address], [place] [address]. All rights reserved. This information is not intended as a substitute for professional medical care. Always follow your healthcare professional's instructions.

## 2021-06-19 NOTE — LETTER
Letter by Xochitl Oglesby PA-C at      Author: Xochitl Oglesby PA-C Service: -- Author Type: --    Filed:  Encounter Date: 5/2/2019 Status: (Other)         Alicia Reyes  [address]  [facility] [address]             May 2, 2019         Dear MsSpencer Reyes,    Below are the results from your recent visit:    Resulted Orders   Pregnancy (Beta-hCG, Qual), Urine   Result Value Ref Range    Pregnancy Test, Urine Negative Negative    Narrative    This test is for screening purposes. Results should be interpreted along with the clinical picture. Confirmation testing is available if warranted by ordering Test 143, Beta HCG, Quantitative.   Chlamydia trachomatis & Neisseria gonorrhoeae, Amplified Detection   Result Value Ref Range    Chlamydia trachomatis, Amplified Detection Negative Negative    Neisseria gonorrhoeae, Amplified Detection Negative Negative   HM2(CBC w/o Differential)   Result Value Ref Range    WBC 6.9 4.0 - 11.0 thou/uL    RBC 4.26 3.80 - 5.40 mill/uL    Hemoglobin 14.0 12.0 - 16.0 g/dL    Hematocrit 42.3 35.0 - 47.0 %    MCV 99 80 - 100 fL    MCH 32.9 27.0 - 34.0 pg    MCHC 33.2 32.0 - 36.0 g/dL    RDW 10.5 (L) 11.0 - 14.5 %    Platelets 212 140 - 440 thou/uL    MPV 8.6 7.0 - 10.0 fL   Thyroid Cascade   Result Value Ref Range    TSH 1.61 0.30 - 5.00 uIU/mL           Labs are normal,    Please call with questions or contact us using [employer].    Sincerely,        Electronically signed by Xochitl Oglesby PA-C

## 2021-06-19 NOTE — LETTER
Letter by Xochitl Oglesby PA-C at      Author: Xochitl Oglesby PA-C Service: -- Author Type: --    Filed:  Encounter Date: 5/29/2019 Status: (Other)        Alicia Reyes  [address]  [facility] [address]             May 29, 2019         Dear Alicia Reyes,    Below are the results from Alicia's recent visit:    Resulted Orders   Holter Monitor    Narrative    HOLTER MONITOR      INTERPRETATION:  05/17/2019    TEST DATE:  05/15/2019    INTERPRETATION:  Predominant rhythm is sinus rhythm with heart rates   ranging from 50  beats per minute to 141 beats per minute.  There was no significant   bradycardia or  pauses.  Only 4 atrial premature beats were noted over 24 hours.  There   was no  ventricular ectopy.  The patient reported symptoms of chest pain on 4   occasions  associated with normal sinus rhythm without ectopy.    CONCLUSION:  Normal Holter.      GUSTAVO HERNANDEZ MD  pn  D 05/17/2019 14:33:47  T 05/17/2019 15:07:49  R 05/17/2019 15:07:49  [id number]       Normal heart monitor testing.    Please call with questions or contact us using [employer].    Sincerely,        Electronically signed by Xochitl Oglesby PA-C

## 2021-06-19 NOTE — LETTER
Letter by Xochitl Oglesby PA-C at      Author: Xochitl Oglesby PA-C Service: -- Author Type: --    Filed:  Encounter Date: 5/16/2019 Status: (Other)         Alicia Reyes  [address]  [facility] [address]             May 16, 2019         Dear Ms. Reyes,    Below are the results from your recent visit:    Resulted Orders   Echo Complete   Result Value Ref Range    LV volume diastolic 60.2 46 - 106 cm3    LV volume systolic 16.3 14 - 42 cm3    IVSd 0.715 0.6 - 0.9 cm    LVIDd 4.17 3.8 - 5.2 cm    LVIDs 2.26 2.2 - 3.5 cm    LVOT diam 1.8 cm    LVOT mean gradient 2 mmHg    LVOT peak VTI 17.8 cm    LVOT mean robin 58.8 cm/s    LVOT peak robin 85 cm/s    LVOT peak gradient 3 mmHg    LV PWd 0.646 0.6 - 0.9 cm    MV E' lat robin 20 cm/s    MV E' med robin 13.8 cm/s    AO root 2.8 cm    LA size 3.1 cm    LA/AO root ratio 1.11 no units    MV decel time 225 ms    MV peak A robin 43.7 cm/s    MV peak E robin 87.3 cm/s    BSA 1.73 m2    Hieght 65 in    Weight 2,320 lbs    /83 mmHg    HR 76 bpm    IVS/PW ratio 1.1     LV FS 45.8 28 - 44 %    Echo LVEF calculated 73 55 - 75 %    LV mass 81.0 g    MV E/A Ratio 2.0     LVOT area 2.54 cm2    LVOT SV 45.3 cm3    LV systolic volume index 9.4 8 - 24 cm3/m2    LV diastolic volume index 34.8 29 - 61 cm3/m2    LV mass index 46.8 g/m2    LV SVi 26.2 ml/m2    MV med E/e' ratio 6.3     MV lat E/e' ratio 4.4     LV CO 3.4 l/min    LV Ci 2.0 l/min/m2    Height 65.0 in    Weight 145 lbs    MV Avg E/e' Ratio 5.2 cm/s    Narrative      Left ventricle ejection fraction is normal. The calculated left   ventricular ejection fraction is 73%.    Normal right ventricular size and systolic function.    No significant valvular heart disease    No previous study for comparison.           Echocardiogram is normal.    Please call with questions or contact us using [employer].    Sincerely,        Electronically signed by Xochitl Oglesby PA-C

## 2021-06-20 NOTE — LETTER
Letter by Xochitl Oglesby PA-C at      Author: Xochitl Oglesby PA-C Service: -- Author Type: --    Filed:  Encounter Date: 6/15/2020 Status: (Other)         Alicia Reyes  [id number]  [facility] [address]             Suzette 15, 2020         Dear Ms. Reyes,    Below are the results from your recent visit:    Resulted Orders   US Breast Left Limited 1-3 Quadrants    Narrative    EXAM: ULTRASOUND BREAST UNILATERAL LEFT LIMITED  LOCATION: [facility]  DATE/TIME: 6/15/2020 11:09 AM    INDICATION: 24-year-old female presents with intermittent, nonfocal left breast pain for at least 2 years with associated increase in the severity of this pain within the last few months. Patient denies any associated palpable mass or left nipple   discharge. Recent provider notes available in the patient's electronic medical chart describe pain within the left upper outer quadrant however the patient indicates that her pain is within within the left lower inner quadrant as well as within the left   axilla.    COMPARISON: None.    ULTRASOUND FINDINGS:     Sonographic evaluation of the left breast and left axilla was performed by both the technologist and the radiologist.    Targeted left breast ultrasound at the area of intermittent, nonfocal pain within the left lower inner quadrant was performed with representative images demonstrated at the 7:00, 8:00, and 9:00 positions, 3 cm from the nipple. Normal-appearing   fibroglandular tissue is demonstrated without underlying suspicious mass or other sonographic abnormality.    Additional sonographic evaluation of the left axilla was performed at the area of pain. Normal appearing subcutaneous tissue is demonstrated without underlying suspicious mass.      Impression    No imaging correlate to reported symptom of intermittent, nonfocal left breast and axillary pain, for which management should be based clinically.    ACR BI-RADS Category 1:  Negative.    I personally scanned and discussed the findings and recommendations with the patient at the conclusion of the examination.        Ultrasound results are normal.    Please call with questions or contact us using [employer].    Sincerely,        Electronically signed by Xochitl Oglesby PA-C

## 2021-06-20 NOTE — LETTER
Letter by Xochitl Oglesby PA-C at      Author: Xochitl Oglesby PA-C Service: -- Author Type: --    Filed:  Encounter Date: 6/10/2020 Status: (Other)         Alicia MCKNIGHT Amy  [id number]  [facility] [address]             Suzette 10, 2020         Dear Ms. Reyes,    Below are the results from your recent visit:    Resulted Orders   D-dimer, Quantitative   Result Value Ref Range    D-Dimer, Quant <0.27 <=0.50 FEU ug/mL    Narrative    0.50 ug/mL(FEU) = cutoff value for exclusion of DVT/PE        D-dimer is negative, this is the clotting test.    Please call with questions or contact us using [employer].    Sincerely,        Electronically signed by Xochitl Oglesby PA-C

## 2021-06-27 NOTE — PROGRESS NOTES
Progress Notes by Cricket Rosario MD at 5/24/2019  9:50 AM     Author: Cricket Rosario MD Service: -- Author Type: Physician    Filed: 5/24/2019 10:29 AM Encounter Date: 5/24/2019 Status: Signed    : Cricket Rosario MD (Physician)           Click to link to [facility] HEART CARE NOTE    Thank you, Dr. Oglesby, Xochitl Ruiz PA-C, for asking the [facility] Heart Care team to see Ms. Alicia Reyes to evaluate Chest Pain; Arm Pain; Dizziness; Shortness of Breath; and Palpitations.      Assessment/Recommendations   Assessment:    1. Chest pain - sounds non-cardiac and the patient has virtually no risk factors for coronary artery disease.  2. Palpitations and pre-syncope - very infrequent symptoms. I doubt it represents a serious arrhythmia and provided reassurance to the patient. We discussed the option of an implantable loop recorder vs a smart-phone based application/product to check heart rhythm when symptoms occur. She will consider whether she wants to proceed with either of these options.    Plan:  1. Monitor for recurrent symptoms for now. Consider ILR, especially if she has another episode of overt syncope.  2. Follow up as needed.           History of Present Illness   Ms. Alicia Reyes is a 23 y.o. female with no chronic medical problems who presents for evaluation of palpitations and some intermittent chest pain.    Ms. Reyes has been experiencing episodic light headedness and pre-syncope for the past 5 years. This tends to occur while driving. Is occasionally associated with palpitations. She has had to pull over a few times to allow symptoms to pass but not always. She generally rolls down the window to allow flow of fresh air and take slow deep breaths and the symptoms resolve after 10-15 minutes. They occur about once every 3-4 months. She has also had some symptoms while at her dest at work. She had one episode of overt syncope 6 years ago while taking a  shower.     She also reports some intermittent sharp chest pain. Located anterior chest wall. Occurs at rest. Lasts seconds, goes away, returns a minute later, and recurs for 10-15 minutes. No associated symptoms. No aggravating or alleviating factors identified.    Alicia exercises regularly, about 3-4 times per week. This consists of weight lifting as well as walking briskly with an incline or running. She will occasionally have a sensation of her heart pounding while working out but this is not all the time and not limiting of her activity.    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     Her father  at the age of 55 due to complications of a 'heart attack'. He was a heavy smoker and had other substance abuse issues as well. Alicia is a never-smoker and has never used illicit drugs.    Other than noted above, Ms. Reyes denies any chest pain/pressure/tightness, shortness of breath at rest or with exertion, light headedness/dizziness, pre-syncope, syncope, lower extremity swelling, palpitations, paroxysmal nocturnal dyspnea (PND), or orthopnea.     Cardiac Problems and Cardiac Diagnostics     Most Recent Cardiac testing:  ECG dated 19 (personaly reviewed and interpreted): normal sinus rhythm with sinus arrhythmia. Normal ECG.    Holter monitor  TEST DATE:  05/15/2019     INTERPRETATION:  Predominant rhythm is sinus rhythm with heart rates ranging from 50  beats per minute to 141 beats per minute.  There was no significant bradycardia or  pauses.  Only 4 atrial premature beats were noted over 24 hours.  There was no  ventricular ectopy.  The patient reported symptoms of chest pain on 4 occasions  associated with normal sinus rhythm without ectopy.     CONCLUSION:  Normal Holter.    ECHO (report reviewed):   Echo results:   Results for orders placed during the hospital encounter of 05/15/19   Echo Complete [ECH10] 05/15/2019    Narrative   Left ventricle ejection fraction is normal. The calculated left    ventricular ejection fraction is 73%.    Normal right ventricular size and systolic function.    No significant valvular heart disease    No previous study for comparison.             Medications  Allergies   Current Outpatient Medications   Medication Sig Dispense Refill   ? cetirizine (ZYRTEC) 10 MG tablet Take 1 tablet (10 mg total) by mouth daily. (Patient taking differently: Take 10 mg by mouth as needed.       ) 30 tablet 2   ? fluticasone propionate (FLONASE ALLERGY RELIEF) 50 mcg/actuation nasal spray 1-2 sprays in each nostril at bedtime. (Patient taking differently: 1-2 sprays in each nostril at bedtime as needed      ) 16 g 0   ? norgestimate-ethinyl estradiol (ORTHO TRI-CYCLEN LO, 28,) 0.18/0.215/0.25 mg-25 mcg Tab tablet Take 1 tablet by mouth daily. 30 tablet 11   ? amoxicillin-clavulanate (AUGMENTIN) 875-125 mg per tablet Take 1 tablet by mouth 2 (two) times a day for 10 days. 20 tablet 0     No current facility-administered medications for this visit.       No Known Allergies     Physical Examination Review of Systems   Vitals:    05/24/19 0941   BP: 120/80   Pulse: 64   Resp: 16     Body mass index is 24.3 kg/m .  Wt Readings from Last 3 Encounters:   05/24/19 146 lb (66.2 kg)   05/15/19 145 lb (65.8 kg)   05/14/19 145 lb 9.6 oz (66 kg)       General Appearance:   Pleasant female, appears stated age. no acute distress, healthy body habitus   ENT/Mouth: membranes moist, no apparent gingival bleeding.      EYES:  no scleral icterus, normal conjunctivae   Neck: no carotid bruits. No anterior cervical lymphadenopaty   Respiratory:   lungs are clear to auscultation, no rales or wheezing, equal chest wall expansion    Cardiovascular:   Regular rhythm, normal rate. Normal first and second heart sounds with no murmurs, rubs, or gallops; the carotid, radial and posterior tibial pulses are intact, Jugular venous pressure normal, no edema bilaterally    Abdomen/GI:  Soft, non-tender   Extremities: no  cyanosis or clubbing   Skin: no xanthelasma, warm.    Heme/lymph/ Immunology No apparent bleeding noted.   Neurologic: Alert and oriented. normal gait, no tremors     Psychiatric: Pleasant, calm, appropriate affect.    A complete 10 system review of systems was performed and is negative except as mentioned in the HPI or below:  General: WNL  Eyes: WNL  Ears/Nose/Throat: WNL  Lungs: WNL  Heart: Chest Pain, Arm Pain, Shortness of Breath with activity  Stomach: WNL  Bladder: WNL  Muscle/Joints: WNL  Skin: WNL  Nervous System: Dizziness  Mental Health: WNL     Blood: WNL       Past History   Past Medical History: seasonal allergies.    Past Surgical History:   Past Surgical History:   Procedure Laterality Date   ? WISDOM TOOTH EXTRACTION         Family History:   Family History   Problem Relation Age of Onset   ? Lung disease Mother    ? Osteoporosis Mother    ? Heart disease Father    ? GI problems Sister    ? Allergy (severe) Sister    ? Lung cancer Maternal Grandmother    ? Cervical cancer Maternal Grandfather         Social History:   Social History     Socioeconomic History   ? Marital status: Single     Spouse name: Not on file   ? Number of children: Not on file   ? Years of education: Not on file   ? Highest education level: Not on file   Occupational History   ? Not on file   Social Needs   ? Financial resource strain: Not on file   ? Food insecurity:     Worry: Not on file     Inability: Not on file   ? Transportation needs:     Medical: Not on file     Non-medical: Not on file   Tobacco Use   ? Smoking status: Never Smoker   ? Smokeless tobacco: Never Used   Substance and Sexual Activity   ? Alcohol use: Yes     Comment: Social    ? Drug use: Never   ? Sexual activity: Yes     Partners: Male   Lifestyle   ? Physical activity:     Days per week: Not on file     Minutes per session: Not on file   ? Stress: Not on file   Relationships   ? Social connections:     Talks on phone: Not on file     Gets together: Not  on file     Attends Rastafarian service: Not on file     Active member of club or organization: Not on file     Attends meetings of clubs or organizations: Not on file     Relationship status: Not on file   ? Intimate partner violence:     Fear of current or ex partner: Not on file     Emotionally abused: Not on file     Physically abused: Not on file     Forced sexual activity: Not on file   Other Topics Concern   ? Not on file   Social History Narrative   ? Not on file              Lab Results    Chemistry/lipid CBC Cardiac Enzymes/BNP/TSH/INR   No results found for: CHOL, HDL, LDLCALC, TRIG, CREATININE, BUN, K, NA, CL, CO2 Lab Results   Component Value Date    WBC 6.9 04/30/2019    HGB 14.0 04/30/2019    HCT 42.3 04/30/2019    MCV 99 04/30/2019     04/30/2019    Lab Results   Component Value Date    TSH 1.61 04/30/2019          Cricket Rosario MD  Non-invasive Cardiology  [facility]

## 2021-06-29 NOTE — PROGRESS NOTES
Progress Notes by Xochitl Oglesby PA-C at 7/9/2020  8:50 AM     Author: Xochitl Oglesby PA-C Service: -- Author Type: Physician Assistant    Filed: 7/9/2020  9:23 AM Encounter Date: 7/9/2020 Status: Signed    : Xochitl Oglesby PA-C (Physician Assistant)       HPI:  Alicia Reyes is a 24 y.o. female who is seen for   Chief Complaint   Patient presents with   ? Follow-up     breast pain    ? Medication Management     Propranolol   Alicia Reyes has been on propranolol for a couple weeks for chest discomfort.  She states it seems to be helping with her anxiety and she is not noting any chest pain or palpitations.  She got a stress echo before she started the propranolol, this was completely normal, reviewed at this visit today.  She has gotten on the treadmill a little bit since being on the propranolol does not feel short of breath or impeded in any way with her exercise.  She does note that when she takes her evening dose she is a little nauseous.  She also notes overall she is more nauseous recently but has just discontinued birth control pills in the last month.  She also had a October pregnancy which was terminated and so her hormones have not been regular recently.  She has had light periods since going off her birth control pill.  She does note a little increased moodiness and pelvic discomfort just before her periods.  She is not currently taking a multivitamin with iron.  She did have a recent CBC and normal hemoglobin, also normal thyroid testing.  ROS: negative for fever, cough, malaise, fatigue, myalgias and as in HPI.    Negative [1]  Left  No Follow Up    Study Result     EXAM: ULTRASOUND BREAST UNILATERAL LEFT LIMITED  LOCATION: [facility]  DATE/TIME: 6/15/2020 11:09 AM     INDICATION: 24-year-old female presents with intermittent, nonfocal left breast pain for at least 2 years with associated increase in the severity of this pain within the last  few months. Patient denies any associated palpable mass or left nipple   discharge. Recent provider notes available in the patient's electronic medical chart describe pain within the left upper outer quadrant however the patient indicates that her pain is within within the left lower inner quadrant as well as within the left   axilla.     COMPARISON: None.     ULTRASOUND FINDINGS:      Sonographic evaluation of the left breast and left axilla was performed by both the technologist and the radiologist.     Targeted left breast ultrasound at the area of intermittent, nonfocal pain within the left lower inner quadrant was performed with representative images demonstrated at the 7:00, 8:00, and 9:00 positions, 3 cm from the nipple. Normal-appearing   fibroglandular tissue is demonstrated without underlying suspicious mass or other sonographic abnormality.     Additional sonographic evaluation of the left axilla was performed at the area of pain. Normal appearing subcutaneous tissue is demonstrated without underlying suspicious mass.     IMPRESSION:   No imaging correlate to reported symptom of intermittent, nonfocal left breast and axillary pain, for which management should be based clinically.     ACR BI-RADS Category 1: Negative.     I personally scanned and discussed the findings and recommendations with the patient at the conclusion of the examination.       No results found for: HGBA1C  No results found for: GLUF, MICROALBUR, LDLCALC, CREATININE  Patient Active Problem List   Diagnosis   ? Pain in axilla, left   ? Panic attack   ? Tenderness of chest wall     Family History   Problem Relation Age of Onset   ? Lung disease Mother    ? Osteoporosis Mother    ? Heart disease Father    ? GI problems Sister    ? Allergy (severe) Sister    ? Lung cancer Maternal Grandmother    ? Cervical cancer Maternal Grandfather      Social History     Socioeconomic History   ? Marital status: Single     Spouse name: None   ? Number  of children: None   ? Years of education: None   ? Highest education level: None   Occupational History   ? None   Social Needs   ? Financial resource strain: None   ? Food insecurity     Worry: None     Inability: None   ? Transportation needs     Medical: None     Non-medical: None   Tobacco Use   ? Smoking status: Never Smoker   ? Smokeless tobacco: Never Used   Substance and Sexual Activity   ? Alcohol use: Yes     Comment: Social    ? Drug use: Never   ? Sexual activity: Yes     Partners: Male   Lifestyle   ? Physical activity     Days per week: None     Minutes per session: None   ? Stress: None   Relationships   ? Social connections     Talks on phone: None     Gets together: None     Attends Denominational service: None     Active member of club or organization: None     Attends meetings of clubs or organizations: None     Relationship status: None   ? Intimate partner violence     Fear of current or ex partner: None     Emotionally abused: None     Physically abused: None     Forced sexual activity: None   Other Topics Concern   ? None   Social History Narrative   ? None     Past Surgical History:   Procedure Laterality Date   ? WISDOM TOOTH EXTRACTION       Current Outpatient Medications on File Prior to Visit   Medication Sig Dispense Refill   ? propranoloL (INDERAL) 20 MG tablet Take 1 tablet (20 mg total) by mouth 2 (two) times a day. 90 tablet 3   ? [DISCONTINUED] amoxicillin-clavulanate (AUGMENTIN) 875-125 mg per tablet Take 1 tablet by mouth 2 (two) times a day for 10 days. 20 tablet 0     No current facility-administered medications on file prior to visit.      No Known Allergies  OB History        1    Para        Term                AB        Living           SAB        TAB        Ectopic        Multiple        Live Births                   I have reviewed the patient's medical history in detail and updated the computerized patient record.  OBJECTIVE:  Wt Readings from Last 3 Encounters:    20 141 lb (64 kg)   20 139 lb (63 kg)   20 142 lb (64.4 kg)     Temp Readings from Last 3 Encounters:   20 98  F (36.7  C) (Oral)   20 98.5  F (36.9  C)   19 98.1  F (36.7  C) (Oral)     BP Readings from Last 3 Encounters:   20 114/66   20 130/84   20 108/68     Pulse Readings from Last 3 Encounters:   20 84   20 70   20 68     Body mass index is 23.46 kg/m .     Alert, cooperative, well-hydrated. Appears well.  Eyes: Pupils equal, round, reactive to light.  HEENT: Sclera white, nares patent, MMM, TM's pearly bilaterally  Lungs: Clear to auscultation. No retractions, no increased work of respiration, equal chest rise.   Heart: Regular rate and rhythm, no murmurs, clicks,   Gallops.  Extremities: no tenderness to palpation of gastrocnemius, bilaterally.  Skin: no increased warmth, edema, or erythema of lower legs bilaterally.  Echo Stress Exercise   Order# [id number]   Reading physician: Beverley Rees MD  Ordering physician: Xochitl Oglesby PA-C  Study date: 6/15/20    Performing Date  Performing Department    Marvin 15, 2020   CARDIAC TESTING [[id number]]    Patient Information     Patient Name   Alicia Reyes  MRN   [id number]  Sex   Female   1   1995 (24 y.o.)    EKG Scan      Stress Test Data - Scan on 6/15/20 10:09 AM by     Indications     UNGER (dyspnea on exertion)    Interpretation Summary       1.The patient's exercise tolerance was normal achieving 12.1 METS on the standard Ko protocol.    2.The stress electrocardiogram is negative for inducible ischemic EKG changes after 12 minutes on the standard Ko protocol, developing no cardiovascular symptoms and achieving 87% of age-predicted maximal heart rate.    3.Left ventricle ejection fraction is normal. The estimated left ventricular ejection fraction is 60%.    4.Stress echocardiogram is negative for inducible ischemia.    5.No previous study for comparison.       Technical Details     Technical Quality  Sonographer:  TRISH  Technical Quality: adequate visualization   Rhythm: Sinus rhythm  No significant valvular abnormalities are noted on screening Doppler exam.    Protocol     Cardiac Protocol  An exercise echocardiogram stress test was performed following a Ko protocol with the patient exercising for 12 minutes and 0 seconds under the supervision of Dr. Vito Rees.    Blood pressure and heart rate demonstrated a normal response to exercise.  The patient's exercise capacity is normal.    Symptoms     Symptoms  No symptoms were reported by the patient during exercise.  The test was stopped due to fatigue.    ECG Summary     ECG  Baseline electrocardiogram demonstrates sinus rhythm.   The stress electrocardiogram is negative for inducible ischemic EKG changes. There were no arrhythmias during stress. There were no arrhythmias during recovery.    Stress Measurements     Baseline Vitals    Baseline HR  77 bpm       Baseline Systolic BP  116       Baseline Diastolic BP  78       Peak Stress Vitals    Last Stress Systolic BP  150       Last Stress Diastolic BP  72       Max HR  168       Exercise Data    Target HR  194       Calculated Percent HR  87 %       Exercise duration (min)  12 min       Exercise duration (sec)  0 sec       Estimated workload  12.1 METS          Findings     Left Ventricle  The estimated pre stress left ventricular ejection fraction is 60%.  This represents a normal ejection fraction. Normal cavity size and wall thickness. Cavity is normal.    Wall Scoring  Resting  Score Index: 1.00                     The left ventricular wall motion is normal.          Stress  Score Index: 1.00                     The left ventricular wall motion is globally hyperkinetic.                 Right Ventricle  Normal cavity size. Wall motion normal.    Left Atrium  Normal cavity size.    Right Atrium  Normal cavity size.    Aortic Valve  The valve is tricuspid. No  stenosis. No regurgitation.    Mitral Valve  Normal valve structure. No stenosis. No regurgitation.    Tricuspid Valve  Tricuspid valve is normal. No stenosis. No regurgitation.    Pulmonic Valve  Normal valve structure. No stenosis. No regurgitation.    Pericardium  No pericardial effusion.    Post-Stress Echo     Response to Stress  Normal wall motion and ventricular cavity response immediately after stress.    Study Impression  Stress echocardiogram is negative for inducible ischemia. Symptoms are not consistent with ischemia. Low risk study.    PACS Images      Show images for Echo Stress Exercise    All Reviewers List     Beverley Rees MD on 6/15/2020 14:13    Signed     Electronically signed by Beverley Rees MD on 6/15/20 at 1351 CDT    Intraprocedure Documentation     Echo Stress Exercise (Order #[id number]) on 6/15/20    Printable Result Report     Result Report     Encounter     View Encounter               Result History   Order [id number]   Signed     by Beverley Rees MD on 6/15/2020 at  1:51 PM    Study Result       1.The patient's exercise tolerance was normal achieving 12.1 METS on the standard Ko protocol.    2.The stress electrocardiogram is negative for inducible ischemic EKG changes after 12 minutes on the standard Ko protocol, developing no cardiovascular symptoms and achieving 87% of age-predicted maximal heart rate.    3.Left ventricle ejection fraction is normal. The estimated left ventricular ejection fraction is 60%.    4.Stress echocardiogram is negative for inducible ischemia.    5.No previous study for comparison.         Saved     by Roxi Hensley EPS on 6/15/2020 at 10:58 AM    Study Result       The stress electrocardiogram was non-diagnostic for ischemia due to baseline ST changes.         Audit Bighorn   Order [id number]   Signed     by Beverley Rees MD on 6/15/2020 at  1:51 PM    Completed by Tech     by Roxi Hensley EPS on 6/15/2020 at 10:58 AM    Saved      by Roxi Hensley, EPS on 6/15/2020 at 10:58 AM    Saved     by Roxi Hensley, EPS on 6/15/2020 at 10:58 AM    Ended Exam     by Roxi Hensley, EPS on 6/15/2020 at 10:54 AM    Documented by Tech     by Roxi Hensley, EPS on 6/15/2020 at 10:54 AM    Order Released     by Az Manuel, RN on 6/15/2020 at  9:28 AM    Linked to Appointment     by Az Manuel RN on 6/15/2020 at  9:28 AM        Labs:  Hospital Outpatient Visit on 06/15/2020   Component Date Value   ? Target HR 06/15/2020 194    ? Max HR 06/15/2020 168    ? Baseline HR 06/15/2020 77    ? Calculated Percent HR 06/15/2020 87    ? Exercise duration (min) 06/15/2020 12    ? Estimated workload 06/15/2020 12.1    ? Exercise duration (sec) 06/15/2020 0    ? Baseline Diastolic BP 06/15/2020 78    ? Baseline Systolic BP 06/15/2020 116    ? Last Stress Diastolic BP 06/15/2020 72    ? Last Stress Systolic BP 06/15/2020 150    ? Rate Pressure Product 06/15/2020 25,200.0    ? Echo LVEF Estimated 06/15/2020 60    Lab on 06/09/2020   Component Date Value   ? TSH 06/09/2020 1.68    ? WBC 06/09/2020 4.8    ? RBC 06/09/2020 4.46    ? Hemoglobin 06/09/2020 15.0    ? Hematocrit 06/09/2020 43.4    ? MCV 06/09/2020 97    ? MCH 06/09/2020 33.6    ? MCHC 06/09/2020 34.6    ? RDW 06/09/2020 10.4*   ? Platelets 06/09/2020 180    ? MPV 06/09/2020 8.8    Office Visit on 06/09/2020   Component Date Value   ? Troponin I 06/09/2020 <0.01    ? D-Dimer, Quant 06/09/2020 <0.27      ASSESMENT/PLAN:  1. Gastroesophageal reflux disease, esophagitis presence not specified  omeprazole (PRILOSEC OTC) 20 MG tablet   2. Pain in axilla, left     3. Panic attack     Will try Prilosec once daily for 2 weeks, she may discontinue this and watch for symptoms of symptoms return return to medication and do 3 months worth.  Explained how this medication works and side effects.  She will continue on propranolol twice daily for anxiety.  She will continue to monitor her nausea, as this  improves with Prilosec then GI related.  2.  Reviewed ultrasound results, negative for breast and axillary concerns.  Next recommended mammogram at age 38.  3.  We will continue propranolol at current dose, watch for any concerns or side effects, try taking it with food at night to see if nausea improves.  Also discussed possibility that going off of birth control has caused more nausea and over time should see improvement in this symptom as well.  May use MyChart to discuss any concerns.  Follow-up as planned for physical.  Follow-up in 3 months for physical.  Xochitl Oglesby, MS, PA-C 07/09/20

## 2021-06-29 NOTE — PROGRESS NOTES
Progress Notes by Sunday Clarke PA-C at 9/29/2020  4:00 PM     Author: Sunday Clarke PA-C Service: -- Author Type: Physician Assistant    Filed: 10/1/2020  8:01 AM Encounter Date: 9/29/2020 Status: Signed    : Sunday Clarke PA-C (Physician Assistant)       Subjective:      Patient ID: Alicia Reyes is a 25 y.o. female.    Chief Complaint:    HPI  Alicia Reyes is a 25 y.o. female who presents today complaining of concern for continued cervical lymphadenopathy since June, last 3 months.  States that she is noticed that there is been some more prominent lymph nodes on the right lateral aspect of the neck that is persisted over the last 3 months.  He is or not associated with any other symptoms to include fever chills night sweats fatigue nausea vomiting diarrhea skin rash abdominal pain urinary symptoms or weight loss.  She has not had any cough or respiratory symptoms.  Has no occupational exposures to chemicals.  She is a non-smoker.  Not use any illicit street drugs.  Only occasional alcohol use.     Patient has not tried treatment for this at home.  She does think that the size of the lymph nodes have waned in size and symptoms since the last 2 to 3 months.    Patient has no prior history of skin hair or nail changes, no thyroid prominence bulges masses or change to bowel habits.  No abdominal or bone pain to report.    History reviewed. No pertinent past medical history.    Past Surgical History:   Procedure Laterality Date   ? WISDOM TOOTH EXTRACTION         Family History   Problem Relation Age of Onset   ? Lung disease Mother    ? Osteoporosis Mother    ? Heart disease Father    ? GI problems Sister    ? Allergy (severe) Sister    ? Lung cancer Maternal Grandmother    ? Cervical cancer Maternal Grandfather        Social History     Tobacco Use   ? Smoking status: Never Smoker   ? Smokeless tobacco: Never Used   Substance Use Topics   ? Alcohol use: Yes     Comment: Social    ? Drug use: Never        Review of Systems  As above in HPI, otherwise balance of Review of Systems are negative.    Objective:     /86 (Patient Site: Right Arm, Patient Position: Sitting, Cuff Size: Adult Regular)   Pulse 73   Temp 98.3  F (36.8  C) (Oral)   Resp 16   Wt 143 lb (64.9 kg)   SpO2 100%   BMI 23.80 kg/m      Physical Exam  General: Patient is resting comfortably no acute distress is afebrile  HEENT: Head is normocephalic atraumatic   eyes are PERRL EOMI sclera anicteric   TMs are clear bilaterally  Throat is clear  Note is made of a small posterior occiput lymph node on the right side.  Also of the right posterior chain has a very small 1 cm lymph node that is palpable.  No other sub-maxillary submental submandibular or anterior chain lymph nodes on the right side and no lymph nodes on the left anterior posterior chain are noted on the neck  No noted lumps masses or bumps on the thyroid  LUNGS: Clear to auscultation bilaterally  HEART: Regular rate and rhythm  Skin: Without rash non-diaphoretic    Lab:  Recent Results (from the past 24 hour(s))   HM1 (CBC with Diff)   Result Value Ref Range    WBC 9.6 4.0 - 11.0 thou/uL    RBC 4.13 3.80 - 5.40 mill/uL    Hemoglobin 14.2 12.0 - 16.0 g/dL    Hematocrit 41.1 35.0 - 47.0 %    MCV 99 80 - 100 fL    MCH 34.2 (H) 27.0 - 34.0 pg    MCHC 34.4 32.0 - 36.0 g/dL    RDW 10.7 (L) 11.0 - 14.5 %    Platelets 210 140 - 440 thou/uL    MPV 9.0 7.0 - 10.0 fL    Neutrophils % 70 50 - 70 %    Lymphocytes % 24 20 - 40 %    Monocytes % 5 2 - 10 %    Eosinophils % 1 0 - 6 %    Basophils % 1 0 - 2 %    Neutrophils Absolute 6.7 2.0 - 7.7 thou/uL    Lymphocytes Absolute 2.3 0.8 - 4.4 thou/uL    Monocytes Absolute 0.5 0.0 - 0.9 thou/uL    Eosinophils Absolute 0.1 0.0 - 0.4 thou/uL    Basophils Absolute 0.1 0.0 - 0.2 thou/uL       Assessment:     Procedures    The encounter diagnosis was Lymphadenopathy.    Plan:     1. Lymphadenopathy  HM1(CBC and Differential)    HM1 (CBC with Diff)     cephalexin (KEFLEX) 500 MG capsule       MDM:  Advised the patient that she will be placed on an antibiotic course and I would check her CBC.  The CBC was returned as normal.  She did not have very large problematic lymph nodes.  However, she is placed on antibiotic and will do hot packs 3 times per day.  Close follow-up with her primary care provider for reevaluation to ensure that the lymph nodes resolved.  Currently, they were not associated with other symptoms especially constitutional symptoms.  This was explained to the patient.  Expected course of resolution and indication for return was gone over.  Unfortunately, the patient left the office before I could review all of the paperwork and treatment plan with the patient.  I called and left a message on her cell phone to contact me to review treatment plan and any questions and scheduled follow up.    Patient Instructions   Hot packs to the right lateral neck 3 times per day.  Take precautions to avoid damage to skin and do not fall asleep with hot packs.  Take antibiotic as written.  Follow-up with your primary care provider in 2 weeks for reevaluation and treatment and to ensure there is resolution of the lymph node swelling.      Patient Education     Lymphadenopathy  Lymphadenopathy is swelling of the lymph nodes. Lymph nodes are small, bean-shaped glands around the body.  What are lymph nodes?  Lymph nodes are part of your immune system. These glands are found in your neck, over your clavicle, armpits, groin, chest, and belly (abdomen). They act as filters for lymph fluid as it flows through your body. Lymph fluid contains white blood cells (lymphocytes) that help the body fight infection and disease.   Why lymph nodes swell  Lymphadenopathy is very common. The glands often get larger during a viral or bacterial infection. It can happen during a cold, the flu, or strep throat. The nodes may swell in just one area of the body, such as the neck (localized).  Or nodes may swell all over the body (generalized). The neck (cervical) lymph nodes are the most common site of lymphadenopathy.  What causes lymphadenopathy?  Dead cells and fluid build up in the lymph nodes as they help fight infection or disease. This causes them to swell in size. Enlarged lymph nodes are often near the source of infection. This can help to find the cause of an infection. For example, swollen lymph nodes around the jaw may be because of an infection in the teeth or mouth. But lymphadenopathy may also be generalized. This is common in some viral illnesses such as infectious mononucleosis, HIV, or chickenpox (varicella).  Lymphadenopathy can also be caused by:    Infection of a lymph node or small group of nodes (lymphadenitis)    Cancer    Reactions to medicines such as antibiotics and certain blood pressure, gout, and seizure medicines    Other health conditions, such as lupus or sarcoidosis  Symptoms of lymphadenopathy  Lymphadenopathy can cause symptoms such as:    Lumps under the jaw, on the sides or back of the neck, in the armpits, in the groin, or in the chest or belly (abdomen)    Pain or soreness in any of these areas    Redness or warmth in any of these areas  You may also have symptoms from an infection causing the swollen glands. These symptoms may include fever, sore throat, body aches, or cough.  Diagnosing lymphadenopathy  Your healthcare provider will ask about your health history and symptoms. He or she will give you a physical exam and check the areas where lymph nodes are enlarged. Your provider will check the size, texture, and location of the nodes. He or she will ask how long they have been swollen and if they are painful. You may be advised to have diagnostic tests and referral to specialists may be advised. The tests may include:    Blood tests. These are done to check for signs of infection and other problems.    Urine test. This is also done to check for infection and  other problems.    Chest X-ray, ultrasound, CT scan, or MRI scans. These tests can show enlarged lymph nodes or other problems.    Lymph node biopsy. The cause of enlarged lymph nodes may be checked with a biopsy. Small samples of lymph node tissue are taken and checked in a lab for signs of infection, cancer, and other causes. You may be referred to other specialists for their opinion as well.  Treatment for lymphadenopathy  The treatment of enlarged lymph nodes depends on the cause. Enlarged lymph nodes are often harmless and go away without any treatment. Treatment is most often done on the cause of the enlarged nodes and may include:    Antibiotic or antiviral medicine to treat a bacterial or viral infection    Incision and drainage of a lymph node for lymphadenitis    Other medicines or procedures to treat the cause of the enlarged nodes  You may need a follow-up exam in 3 to 4 weeks to recheck enlarged nodes.  When to call your healthcare provider  Call your healthcare provider if:    Your symptoms get worse    You have new symptoms    You have a fever of 100.4 F (38 C) or higher, or as directed by your provider    Your lymph nodes that are still swollen after 3 to 4 weeks    Date Last Reviewed: 6/1/2019 2000-2019 [employer]. [address] [place] [id number]. All rights reserved. This information is not intended as a substitute for professional medical care. Always follow your healthcare professional's instructions.

## 2021-06-30 NOTE — PROGRESS NOTES
Progress Notes by Annabelle Olvera CNP at 3/4/2021  2:15 PM     Author: Annabelle Olvera CNP Service: -- Author Type: Nurse Practitioner    Filed: 3/16/2021  8:00 AM Encounter Date: 3/4/2021 Status: Signed    : Annabelle Olvera CNP (Nurse Practitioner)            [facility]  Patient Name: Alicia Reyes  Patient Age: 25 y.o.  YOB: 1995  MRN: [id number]    Date of Visit: 3/4/2021  Reason for Office Visit:   Chief Complaint   Patient presents with   ? Menstrual Problem     maddox have been irregular. Not taking BC anymore.            Assessment / Plan / Medical Decision Makin. Amenorrhea  - Pregnancy, Urine    2. Irregular menses  - US Pelvis With Transvaginal Non OB; Future    3. Lymphadenopathy  - US Neck Limited; Future    Follow up post diagnostic testing, sooner if needed. All patient's questions addressed, patient verbalized understanding and agreement with plan.     Orders Placed This Encounter   Procedures   ? US Pelvis With Transvaginal Non OB   ? US Neck Limited   ? Pregnancy, Urine   Followup: Return in about 1 week (around 3/11/2021). earlier if needed.            Health Maintenance Review  Health Maintenance   Topic Date Due   ? ADVANCE CARE PLANNING  Never done   ? PREVENTIVE CARE VISIT  2021   ? PAP SMEAR  2023   ? TD 18+ HE  2026   ? INFLUENZA VACCINE RULE BASED  Completed   ? TDAP ADULT ONE TIME DOSE  Completed   ? Pneumococcal Vaccine: Pediatrics (0 to 5 Years) and At-Risk Patients (6 to 64 Years)  Aged Out   ? HEPATITIS B VACCINES  Aged Out   ? HEPATITIS C SCREENING  Discontinued   ? HIV SCREENING  Discontinued         I am having Alicia Reyes maintain her propranoloL, omeprazole, omeprazole, and LORazepam.      HPI:  Alicia Reyes is a 25 y.o. year old who presents to the office today with chronic conditions including Anxiety, GERD, history of lymphadenopathy, gastroesophageal reflux disease, history of panic  attacks.  Patient presents concern for ongoing swollen cervical nodes which she states she has 2 on the right and 2 on the left that have not gone down.    Patient also reports she has had obese.  Not on birth control reports that she feels bloated from time to time intermittent headaches and feels as though her cycle may be starting but nothing occurs.  She did take an at home pregnancy test which was negative a few days ago.    Cycle changing and was 36 day between last period.  She has not been on bc since 5.2020.  Period is only lasting 2-3 days and previously had 4-5 days.        Review of Systems- see HPI       Current Scheduled Meds:  Outpatient Encounter Medications as of 3/4/2021   Medication Sig Dispense Refill   ? LORazepam (ATIVAN) 1 MG tablet Take 0.5-1 tablets (0.5-1 mg total) by mouth 2 (two) times a day as needed for anxiety. 10 tablet 0   ? omeprazole (PRILOSEC OTC) 20 MG tablet Take 1 tablet (20 mg total) by mouth daily. 28 tablet 1   ? omeprazole (PRILOSEC) 40 MG capsule Take 1 capsule (40 mg total) by mouth daily. 60 capsule 1   ? propranoloL (INDERAL) 20 MG tablet Take 1 tablet (20 mg total) by mouth 2 (two) times a day. 90 tablet 3     No facility-administered encounter medications on file as of 3/4/2021.      No past medical history on file.  Past Surgical History:   Procedure Laterality Date   ? WISDOM TOOTH EXTRACTION       Social History     Tobacco Use   ? Smoking status: Never Smoker   ? Smokeless tobacco: Never Used   Substance Use Topics   ? Alcohol use: Yes     Comment: Social    ? Drug use: Never     Family History   Problem Relation Age of Onset   ? Lung disease Mother    ? Osteoporosis Mother    ? Heart disease Father    ? GI problems Sister    ? Allergy (severe) Sister    ? Lung cancer Maternal Grandmother    ? Cervical cancer Maternal Grandfather      Social History     Social History Narrative   ? Not on file       Objective / Physical Examination:  Vitals:    03/04/21 1423   BP:  "108/80   Pulse: 72   Resp: 22   Temp: 98.1  F (36.7  C)   SpO2: 100%   Weight: 143 lb (64.9 kg)   Height: 5' 5\" (1.651 m)     Wt Readings from Last 3 Encounters:   03/04/21 143 lb (64.9 kg)   09/29/20 143 lb (64.9 kg)   08/19/20 143 lb (64.9 kg)     Body mass index is 23.8 kg/m .     General Appearance: Alert and oriented, cooperative, affect appropriate, speech clear, in no apparent distress  Head: Normocephalic, atraumatic  Eyes: Conjunctivae clear and sclerae non-icteric  Neck: Supple, trachea midline. No cervical adenopathy  Lungs: Clear to auscultation bilaterally. No adventitious lung sounds noted. Normal inspiratory and expiratory effort  Cardiovascular: Regular rate, normal S1, S2. No murmurs, rubs, or gallops  Abdomen: Bowel sounds active all four quadrants. Soft, non-tender. No hepatomegaly or splenomegaly.   Integumentary: Warm and dry.   Neuro: Alert and oriented, follows commands appropriately.     Diagnostics:       Quality review:     PHQ-2 Total Score: 0 (9/3/2020  7:00 AM)      PHQ-9 Total Score: 1 (9/3/2020  7:00 AM)        Annabelle Olvera CNP  [facility]  [facility] [address] [address]             "

## 2021-10-10 ENCOUNTER — HEALTH MAINTENANCE LETTER (OUTPATIENT)
Age: 26
End: 2021-10-10

## 2021-10-17 ENCOUNTER — IMMUNIZATION (OUTPATIENT)
Dept: FAMILY MEDICINE | Facility: CLINIC | Age: 26
End: 2021-10-17
Payer: COMMERCIAL

## 2021-10-17 PROCEDURE — 90471 IMMUNIZATION ADMIN: CPT

## 2021-10-17 PROCEDURE — 90686 IIV4 VACC NO PRSV 0.5 ML IM: CPT

## 2021-11-09 SDOH — ECONOMIC STABILITY: FOOD INSECURITY: WITHIN THE PAST 12 MONTHS, YOU WORRIED THAT YOUR FOOD WOULD RUN OUT BEFORE YOU GOT MONEY TO BUY MORE.: NEVER TRUE

## 2021-11-09 SDOH — HEALTH STABILITY: PHYSICAL HEALTH: ON AVERAGE, HOW MANY MINUTES DO YOU ENGAGE IN EXERCISE AT THIS LEVEL?: 30 MIN

## 2021-11-09 SDOH — ECONOMIC STABILITY: TRANSPORTATION INSECURITY
IN THE PAST 12 MONTHS, HAS THE LACK OF TRANSPORTATION KEPT YOU FROM MEDICAL APPOINTMENTS OR FROM GETTING MEDICATIONS?: NO

## 2021-11-09 SDOH — ECONOMIC STABILITY: INCOME INSECURITY: HOW HARD IS IT FOR YOU TO PAY FOR THE VERY BASICS LIKE FOOD, HOUSING, MEDICAL CARE, AND HEATING?: NOT HARD AT ALL

## 2021-11-09 SDOH — ECONOMIC STABILITY: FOOD INSECURITY: WITHIN THE PAST 12 MONTHS, THE FOOD YOU BOUGHT JUST DIDN'T LAST AND YOU DIDN'T HAVE MONEY TO GET MORE.: NEVER TRUE

## 2021-11-09 SDOH — ECONOMIC STABILITY: TRANSPORTATION INSECURITY
IN THE PAST 12 MONTHS, HAS LACK OF TRANSPORTATION KEPT YOU FROM MEETINGS, WORK, OR FROM GETTING THINGS NEEDED FOR DAILY LIVING?: NO

## 2021-11-09 SDOH — ECONOMIC STABILITY: INCOME INSECURITY: IN THE LAST 12 MONTHS, WAS THERE A TIME WHEN YOU WERE NOT ABLE TO PAY THE MORTGAGE OR RENT ON TIME?: NO

## 2021-11-09 SDOH — HEALTH STABILITY: PHYSICAL HEALTH: ON AVERAGE, HOW MANY DAYS PER WEEK DO YOU ENGAGE IN MODERATE TO STRENUOUS EXERCISE (LIKE A BRISK WALK)?: 5 DAYS

## 2021-11-09 ASSESSMENT — ANXIETY QUESTIONNAIRES
1. FEELING NERVOUS, ANXIOUS, OR ON EDGE: MORE THAN HALF THE DAYS
5. BEING SO RESTLESS THAT IT IS HARD TO SIT STILL: NOT AT ALL
7. FEELING AFRAID AS IF SOMETHING AWFUL MIGHT HAPPEN: MORE THAN HALF THE DAYS
6. BECOMING EASILY ANNOYED OR IRRITABLE: MORE THAN HALF THE DAYS
2. NOT BEING ABLE TO STOP OR CONTROL WORRYING: MORE THAN HALF THE DAYS
GAD7 TOTAL SCORE: 11
GAD7 TOTAL SCORE: 11
8. IF YOU CHECKED OFF ANY PROBLEMS, HOW DIFFICULT HAVE THESE MADE IT FOR YOU TO DO YOUR WORK, TAKE CARE OF THINGS AT HOME, OR GET ALONG WITH OTHER PEOPLE?: NOT DIFFICULT AT ALL
7. FEELING AFRAID AS IF SOMETHING AWFUL MIGHT HAPPEN: MORE THAN HALF THE DAYS
4. TROUBLE RELAXING: SEVERAL DAYS
3. WORRYING TOO MUCH ABOUT DIFFERENT THINGS: MORE THAN HALF THE DAYS
GAD7 TOTAL SCORE: 11

## 2021-11-09 ASSESSMENT — SOCIAL DETERMINANTS OF HEALTH (SDOH)
DO YOU BELONG TO ANY CLUBS OR ORGANIZATIONS SUCH AS CHURCH GROUPS UNIONS, FRATERNAL OR ATHLETIC GROUPS, OR SCHOOL GROUPS?: NO
ARE YOU MARRIED, WIDOWED, DIVORCED, SEPARATED, NEVER MARRIED, OR LIVING WITH A PARTNER?: LIVING WITH PARTNER
HOW OFTEN DO YOU GET TOGETHER WITH FRIENDS OR RELATIVES?: ONCE A WEEK
HOW OFTEN DO YOU ATTEND CHURCH OR RELIGIOUS SERVICES?: NEVER
IN A TYPICAL WEEK, HOW MANY TIMES DO YOU TALK ON THE PHONE WITH FAMILY, FRIENDS, OR NEIGHBORS?: TWICE A WEEK

## 2021-11-09 ASSESSMENT — LIFESTYLE VARIABLES
HOW OFTEN DO YOU HAVE SIX OR MORE DRINKS ON ONE OCCASION: NEVER
HOW OFTEN DO YOU HAVE A DRINK CONTAINING ALCOHOL: 2-4 TIMES A MONTH
HOW MANY STANDARD DRINKS CONTAINING ALCOHOL DO YOU HAVE ON A TYPICAL DAY: 1 OR 2

## 2021-11-09 ASSESSMENT — PATIENT HEALTH QUESTIONNAIRE - PHQ9
10. IF YOU CHECKED OFF ANY PROBLEMS, HOW DIFFICULT HAVE THESE PROBLEMS MADE IT FOR YOU TO DO YOUR WORK, TAKE CARE OF THINGS AT HOME, OR GET ALONG WITH OTHER PEOPLE: NOT DIFFICULT AT ALL
SUM OF ALL RESPONSES TO PHQ QUESTIONS 1-9: 2
SUM OF ALL RESPONSES TO PHQ QUESTIONS 1-9: 2

## 2021-11-10 ASSESSMENT — PATIENT HEALTH QUESTIONNAIRE - PHQ9: SUM OF ALL RESPONSES TO PHQ QUESTIONS 1-9: 2

## 2021-11-10 ASSESSMENT — ANXIETY QUESTIONNAIRES: GAD7 TOTAL SCORE: 11

## 2021-11-12 ENCOUNTER — OFFICE VISIT (OUTPATIENT)
Dept: INTERNAL MEDICINE | Facility: CLINIC | Age: 26
End: 2021-11-12
Payer: COMMERCIAL

## 2021-11-12 VITALS
HEART RATE: 68 BPM | DIASTOLIC BLOOD PRESSURE: 62 MMHG | HEIGHT: 66 IN | SYSTOLIC BLOOD PRESSURE: 104 MMHG | OXYGEN SATURATION: 100 % | TEMPERATURE: 98 F | WEIGHT: 135 LBS | BODY MASS INDEX: 21.69 KG/M2

## 2021-11-12 DIAGNOSIS — Z86.59 HISTORY OF PANIC ATTACKS: ICD-10-CM

## 2021-11-12 DIAGNOSIS — Z00.00 ENCOUNTER FOR ANNUAL PHYSICAL EXAM: Primary | ICD-10-CM

## 2021-11-12 LAB
ALBUMIN SERPL-MCNC: 4.1 G/DL (ref 3.5–5)
ALP SERPL-CCNC: 40 U/L (ref 45–120)
ALT SERPL W P-5'-P-CCNC: 11 U/L (ref 0–45)
ANION GAP SERPL CALCULATED.3IONS-SCNC: 8 MMOL/L (ref 5–18)
AST SERPL W P-5'-P-CCNC: 15 U/L (ref 0–40)
BASOPHILS # BLD AUTO: 0 10E3/UL (ref 0–0.2)
BASOPHILS NFR BLD AUTO: 1 %
BILIRUB SERPL-MCNC: 0.5 MG/DL (ref 0–1)
BUN SERPL-MCNC: 10 MG/DL (ref 8–22)
CALCIUM SERPL-MCNC: 9.9 MG/DL (ref 8.5–10.5)
CHLORIDE BLD-SCNC: 106 MMOL/L (ref 98–107)
CHOLEST SERPL-MCNC: 168 MG/DL
CO2 SERPL-SCNC: 26 MMOL/L (ref 22–31)
CREAT SERPL-MCNC: 0.75 MG/DL (ref 0.6–1.1)
EOSINOPHIL # BLD AUTO: 0.1 10E3/UL (ref 0–0.7)
EOSINOPHIL NFR BLD AUTO: 1 %
ERYTHROCYTE [DISTWIDTH] IN BLOOD BY AUTOMATED COUNT: 11.9 % (ref 10–15)
FASTING STATUS PATIENT QL REPORTED: NORMAL
GFR SERPL CREATININE-BSD FRML MDRD: >90 ML/MIN/1.73M2
GLUCOSE BLD-MCNC: 83 MG/DL (ref 70–125)
HCT VFR BLD AUTO: 40.4 % (ref 35–47)
HDLC SERPL-MCNC: 68 MG/DL
HGB BLD-MCNC: 13.7 G/DL (ref 11.7–15.7)
IMM GRANULOCYTES # BLD: 0 10E3/UL
IMM GRANULOCYTES NFR BLD: 0 %
LDLC SERPL CALC-MCNC: 86 MG/DL
LYMPHOCYTES # BLD AUTO: 1.8 10E3/UL (ref 0.8–5.3)
LYMPHOCYTES NFR BLD AUTO: 30 %
MCH RBC QN AUTO: 33.5 PG (ref 26.5–33)
MCHC RBC AUTO-ENTMCNC: 33.9 G/DL (ref 31.5–36.5)
MCV RBC AUTO: 99 FL (ref 78–100)
MONOCYTES # BLD AUTO: 0.4 10E3/UL (ref 0–1.3)
MONOCYTES NFR BLD AUTO: 7 %
NEUTROPHILS # BLD AUTO: 3.6 10E3/UL (ref 1.6–8.3)
NEUTROPHILS NFR BLD AUTO: 61 %
PLATELET # BLD AUTO: 195 10E3/UL (ref 150–450)
POTASSIUM BLD-SCNC: 4.2 MMOL/L (ref 3.5–5)
PROT SERPL-MCNC: 6.6 G/DL (ref 6–8)
RBC # BLD AUTO: 4.09 10E6/UL (ref 3.8–5.2)
SODIUM SERPL-SCNC: 140 MMOL/L (ref 136–145)
TRIGL SERPL-MCNC: 68 MG/DL
WBC # BLD AUTO: 5.8 10E3/UL (ref 4–11)

## 2021-11-12 PROCEDURE — 99395 PREV VISIT EST AGE 18-39: CPT | Performed by: NURSE PRACTITIONER

## 2021-11-12 PROCEDURE — 91300 PR COVID VAC PFIZER DIL RECON 30 MCG/0.3 ML IM: CPT | Performed by: NURSE PRACTITIONER

## 2021-11-12 PROCEDURE — 36415 COLL VENOUS BLD VENIPUNCTURE: CPT | Performed by: NURSE PRACTITIONER

## 2021-11-12 PROCEDURE — 80061 LIPID PANEL: CPT | Performed by: NURSE PRACTITIONER

## 2021-11-12 PROCEDURE — 0004A PR COVID VAC PFIZER DIL RECON 30 MCG/0.3 ML IM: CPT | Performed by: NURSE PRACTITIONER

## 2021-11-12 PROCEDURE — 80053 COMPREHEN METABOLIC PANEL: CPT | Performed by: NURSE PRACTITIONER

## 2021-11-12 PROCEDURE — 85025 COMPLETE CBC W/AUTO DIFF WBC: CPT | Performed by: NURSE PRACTITIONER

## 2021-11-12 RX ORDER — OMEPRAZOLE 40 MG/1
40 CAPSULE, DELAYED RELEASE ORAL DAILY
COMMUNITY
Start: 2020-08-19 | End: 2022-04-13

## 2021-11-12 RX ORDER — SPIRONOLACTONE 50 MG/1
1 TABLET, FILM COATED ORAL DAILY
COMMUNITY
Start: 2021-04-30 | End: 2022-07-15

## 2021-11-12 RX ORDER — LORAZEPAM 1 MG/1
.5-1 TABLET ORAL PRN
COMMUNITY
Start: 2020-08-19 | End: 2022-02-16

## 2021-11-12 ASSESSMENT — MIFFLIN-ST. JEOR: SCORE: 1351.17

## 2021-11-12 NOTE — PROGRESS NOTES
SUBJECTIVE:   CC: Alicia Reyes is an 26 year old woman who presents for preventive health visit.       Patient has been advised of split billing requirements and indicates understanding: Yes  Healthy Habits:     Getting at least 3 servings of Calcium per day:  Yes    Bi-annual eye exam:  Yes    Dental care twice a year:  Yes    Sleep apnea or symptoms of sleep apnea:  None    Diet:  Regular (no restrictions)    Frequency of exercise:  2-3 days/week    Duration of exercise:  15-30 minutes    Taking medications regularly:  Yes    Medication side effects:  Lightheadedness    PHQ-2 Total Score: 0    Additional concerns today:  No              Today's PHQ-2 Score:   PHQ-2 ( 1999 [employer]) 11/9/2021   Q1: Little interest or pleasure in doing things 0   Q2: Feeling down, depressed or hopeless 0   PHQ-2 Score 0   Q1: Little interest or pleasure in doing things Not at all   Q2: Feeling down, depressed or hopeless Not at all   PHQ-2 Score 0       Abuse: Current or Past (Physical, Sexual or Emotional) - No  Do you feel safe in your environment? Yes    Have you ever done Advance Care Planning? (For example, a Health Directive, POLST, or a discussion with a medical provider or your loved ones about your wishes): No, advance care planning information given to patient to review.  Patient plans to discuss their wishes with loved ones or provider.      Social History     Tobacco Use     Smoking status: Never Smoker     Smokeless tobacco: Never Used   Substance Use Topics     Alcohol use: Yes     Comment: Alcoholic Drinks/day: Social          Alcohol Use 11/9/2021   Prescreen: >3 drinks/day or >7 drinks/week? No       Reviewed orders with patient.  Reviewed health maintenance and updated orders accordingly -   BP Readings from Last 3 Encounters:   11/12/21 104/62   03/04/21 108/80   09/29/20 124/86    Wt Readings from Last 3 Encounters:   11/12/21 61.2 kg (135 lb)   03/04/21 64.9 kg (143 lb)   09/29/20 64.9 kg (143 lb)                   Patient Active Problem List   Diagnosis     Pain in axilla, left     Panic attack     Tenderness of chest wall     Past Surgical History:   Procedure Laterality Date     WISDOM TOOTH EXTRACTION         Social History     Tobacco Use     Smoking status: Never Smoker     Smokeless tobacco: Never Used   Substance Use Topics     Alcohol use: Yes     Comment: Alcoholic Drinks/day: Social      Family History   Problem Relation Age of Onset     LUNG DISEASE Mother      Osteoporosis Mother      Heart Disease Father      GI problems Sister      Allergy (Severe) Sister      Lung Cancer Maternal Grandmother      Cervical Cancer Maternal Grandfather          Current Outpatient Medications   Medication Sig Dispense Refill     LORazepam (ATIVAN) 1 MG tablet Take 0.5-1 mg by mouth as needed       Multiple Vitamin (MULTIVITAMIN PO)        omeprazole (PRILOSEC) 40 MG DR capsule Take 40 mg by mouth daily prn       spironolactone (ALDACTONE) 50 MG tablet Take 1 tablet by mouth daily       No Known Allergies    Breast Cancer Screening:    Breast CA Risk Assessment (FHS-7) 11/9/2021   Do you have a family history of breast, colon, or ovarian cancer? No / Unknown         Patient under 40 years of age: Routine Mammogram Screening not recommended.   Pertinent mammograms are reviewed under the imaging tab.    History of abnormal Pap smear: NO - age 21-29 PAP every 3 years recommended  PAP / HPV Latest Ref Rng & Units 8/19/2020   PAP Negative for squamous intraepithelial lesion or malignancy. Negative for squamous intraepithelial lesion or malignancy  Electronically signed by Adelita Lucas CT (ASCP) on 8/21/2020 at  9:36 AM       Reviewed and updated as needed this visit by clinical staff  Tobacco  Allergies  Meds  Problems  Med Hx  Surg Hx  Fam Hx         Reviewed and updated as needed this visit by Provider  Tobacco  Allergies  Meds  Problems  Med Hx  Surg Hx  Fam Hx        History reviewed. No pertinent past  medical history.   Past Surgical History:   Procedure Laterality Date     WISDOM TOOTH EXTRACTION       OB History    Para Term  AB Living   1 0 0 0 0 0   SAB IAB Ectopic Multiple Live Births   0 0 0 0 0      # Outcome Date GA Lbr Nadir/2nd Weight Sex Delivery Anes PTL Lv   1                 Review of Systems  Unremarkable than listed above and below     OBJECTIVE:   There were no vitals taken for this visit.  Physical Exam  GENERAL: healthy, alert and no distress  EYES: Eyes grossly normal to inspection, PERRL and conjunctivae and sclerae normal  HENT: ear canals and TM's normal, nose and mouth without ulcers or lesions  NECK: no adenopathy, no asymmetry, masses, or scars and thyroid normal to palpation  RESP: lungs clear to auscultation - no rales, rhonchi or wheezes  BREAST: normal without masses, tenderness or nipple discharge and no palpable axillary masses or adenopathy  CV: regular rate and rhythm, normal S1 S2, no S3 or S4, no murmur, click or rub, no peripheral edema and peripheral pulses strong  ABDOMEN: soft, nontender, no hepatosplenomegaly, no masses and bowel sounds normal  MS: no gross musculoskeletal defects noted, no edema  SKIN: no suspicious lesions or rashes  NEURO: Normal strength and tone, mentation intact and speech normal  PSYCH: mentation appears normal, affect normal/bright        ASSESSMENT/PLAN:     Problem List Items Addressed This Visit     None      Visit Diagnoses     Encounter for annual physical exam    -  Primary    Relevant Orders    Comprehensive metabolic panel    CBC with platelets and differential    Lipid Profile (Chol, Trig, HDL, LDL calc)    History of panic attacks          Patient reports overall she is doing well denies additional concerns has not utilize her Ativan recently.  Reports some increase anxiety with her stress at work though feels that this is now improving.  Recommend no changes at this time follow-up 1 year    Patient has been advised of  "split billing requirements and indicates understanding:   COUNSELING:  Reviewed preventive health counseling, as reflected in patient instructions       Regular exercise       Healthy diet/nutrition       Vision screening       Contraception       Family planning    Estimated body mass index is 23.8 kg/m  as calculated from the following:    Height as of 3/4/21: 1.651 m (5' 5\").    Weight as of 3/4/21: 64.9 kg (143 lb).        She reports that she has never smoked. She has never used smokeless tobacco.      Counseling Resources:  ATP IV Guidelines  Pooled Cohorts Equation Calculator  Breast Cancer Risk Calculator  BRCA-Related Cancer Risk Assessment: FHS-7 Tool  FRAX Risk Assessment  ICSI Preventive Guidelines  Dietary Guidelines for Americans, 2010  [employer] My[employer]  ASA Prophylaxis  Lung CA Screening    Annabelle Olvera [facility]  Answers for HPI/ROS submitted by the patient on 11/9/2021  If you checked off any problems, how difficult have these problems made it for you to do your work, take care of things at home, or get along with other people?: Not difficult at all  PHQ9 TOTAL SCORE: 2  MJ 7 TOTAL SCORE: 11      "

## 2022-02-14 ENCOUNTER — MYC MEDICAL ADVICE (OUTPATIENT)
Dept: INTERNAL MEDICINE | Facility: CLINIC | Age: 27
End: 2022-02-14
Payer: COMMERCIAL

## 2022-02-16 ENCOUNTER — VIRTUAL VISIT (OUTPATIENT)
Dept: INTERNAL MEDICINE | Facility: CLINIC | Age: 27
End: 2022-02-16
Payer: COMMERCIAL

## 2022-02-16 DIAGNOSIS — N92.1 MENORRHAGIA WITH IRREGULAR CYCLE: Primary | ICD-10-CM

## 2022-02-16 PROCEDURE — 99214 OFFICE O/P EST MOD 30 MIN: CPT | Mod: 95 | Performed by: NURSE PRACTITIONER

## 2022-02-16 NOTE — PROGRESS NOTES
Alicia is a 26 year old who is being evaluated via a billable video visit.      How would you like to obtain your AVS? MyChart  If the video visit is dropped, the invitation should be resent by: Text to cell phone: [phone]  Will anyone else be joining your video visit? No    Video Start Time: 4:28 PM    Assessment & Plan   Problem List Items Addressed This Visit     None      Visit Diagnoses     Menorrhagia with irregular cycle    -  Primary         Discussed options with patient, she elects for watchful waiting. If symptoms return, worsen or new symptoms arise, will order a transvaginal ultrasound.                No follow-ups on file.    Annabelle Olvera NP  [facility]   Alicia is a 26 year old who presents for the following health issues     HPI     Patient reports bleeding between periods, irregular, and when gets them they are a lot heavier then expected. She is using a cycle tracker 28 days.  She bled at day 18 spotting and then heavier flow which lasted a couple of days.  She states it has now stopped. She didn't notice any worsening cramping. She states last February she had a 36 day cycle and previous had a 23 day cycle     Using justyn        Current Outpatient Medications:      Multiple Vitamin (MULTIVITAMIN PO), , Disp: , Rfl:      omeprazole (PRILOSEC) 40 MG DR capsule, Take 40 mg by mouth daily prn, Disp: , Rfl:      spironolactone (ALDACTONE) 50 MG tablet, Take 1 tablet by mouth daily, Disp: , Rfl:      LORazepam (ATIVAN) 1 MG tablet, Take 0.5-1 mg by mouth as needed (Patient not taking: Reported on 2/16/2022), Disp: , Rfl:       Review of Systems   Unremarkable other than listed above and below         Objective           Vitals:  No vitals were obtained today due to virtual visit.    Physical Exam   GENERAL: Healthy, alert and no distress  EYES: Eyes grossly normal to inspection.  No discharge or erythema, or obvious scleral/conjunctival abnormalities.  RESP: No  audible wheeze, cough, or visible cyanosis.  No visible retractions or increased work of breathing.    SKIN: Visible skin clear. No significant rash, abnormal pigmentation or lesions.  NEURO: Cranial nerves grossly intact.  Mentation and speech appropriate for age.  PSYCH: Mentation appears normal, affect normal/bright, judgement and insight intact, normal speech and appearance well-groomed.                Video-Visit Details    Type of service:  Video Visit    Video End Time:4:33 PM    Originating Location (pt. Location): Home    Distant Location (provider location):  [facility]     Platform used for Video Visit: [employer]

## 2022-03-30 ENCOUNTER — MYC MEDICAL ADVICE (OUTPATIENT)
Dept: INTERNAL MEDICINE | Facility: CLINIC | Age: 27
End: 2022-03-30
Payer: COMMERCIAL

## 2022-04-12 ASSESSMENT — ANXIETY QUESTIONNAIRES
1. FEELING NERVOUS, ANXIOUS, OR ON EDGE: NOT AT ALL
GAD7 TOTAL SCORE: 0
4. TROUBLE RELAXING: NOT AT ALL
7. FEELING AFRAID AS IF SOMETHING AWFUL MIGHT HAPPEN: NOT AT ALL
2. NOT BEING ABLE TO STOP OR CONTROL WORRYING: NOT AT ALL
3. WORRYING TOO MUCH ABOUT DIFFERENT THINGS: NOT AT ALL
5. BEING SO RESTLESS THAT IT IS HARD TO SIT STILL: NOT AT ALL
7. FEELING AFRAID AS IF SOMETHING AWFUL MIGHT HAPPEN: NOT AT ALL
GAD7 TOTAL SCORE: 0
6. BECOMING EASILY ANNOYED OR IRRITABLE: NOT AT ALL

## 2022-04-12 NOTE — PROGRESS NOTES
"1. Voice strain  2. Lymphadenopathy  3. Throat fullness  - Otolaryngology Referral; Future    4. Sensation of chest pressure  Recommend starting a PPI. Patient advised if symptoms persist, worsen or new symptoms arise they are to seek medical care.  All patients questions addressed. Patient verbalized understanding and agreement with plan.           Deya Vargas is a 26 year old who presents for the following health issues     History of Present Illness       Mental Health Follow-up:                      Today's MJ-7 Score: 0    Reason for visit:  Change in swollen nodes in neck  Symptom onset:  1-2 weeks ago  Symptoms include:  Discomfort, tenderness (resolved), pressure, strained voice  Symptom intensity:  Mild  Symptom progression:  Staying the same  Had these symptoms before:  Yes  Has tried/received treatment for these symptoms:  No    She eats 2-3 servings of fruits and vegetables daily.She consumes 1 sweetened beverage(s) daily.She exercises with enough effort to increase her heart rate 30 to 60 minutes per day.  She exercises with enough effort to increase her heart rate 3 or less days per week.   She is taking medications regularly.     Patient has had intermittent lymphadenopathy over the past 2 years she underwent lab work and imaging last year which was unremarkable.  She is indicates that it waxes and wanes over the last 2 weeks increasing discomfort most prominent left side of her neck she also describes some fullness on the left side of her neck and intermittent voice strain with extensive talking.  Patient reports overall she is feeling well and ROS is unremarkable  Wt Readings from Last 5 Encounters:   04/13/22 61.8 kg (136 lb 3.2 oz)   11/12/21 61.2 kg (135 lb)   03/04/21 64.9 kg (143 lb)   09/29/20 64.9 kg (143 lb)   08/19/20 64.9 kg (143 lb)       Patient also describes substernal chest \"pressure\" when lying down and if her dog lays his head on her chest she indicates it feels heavy, " "uncomfortable and pressure.  Denies any nausea, vomiting, acid breath difficulty breathing        Review of Systems   Constitutional, HEENT, cardiovascular, pulmonary, GI, , musculoskeletal, neuro, skin, endocrine and psych systems are negative, except as otherwise noted.      Objective    /60 (BP Location: Right arm, Patient Position: Sitting, Cuff Size: Adult Regular)   Pulse 68   Temp 98.7  F (37.1  C) (Oral)   Resp 16   Ht 1.649 m (5' 4.92\")   Wt 61.8 kg (136 lb 3.2 oz)   LMP 03/17/2022 (Exact Date)   Breastfeeding No   BMI 22.72 kg/m    Body mass index is 22.72 kg/m .  Physical Exam   GENERAL: healthy, alert and no distress  EYES: Eyes grossly normal to inspection, PERRL and conjunctivae and sclerae normal  HENT: ear canals and TM's normal, nose and mouth without ulcers or lesions  NECK: Left cervical lymphadenopathy appreciated no mass  RESP: lungs clear to auscultation - no rales, rhonchi or wheezes  CV: regular rate and rhythm, normal S1 S2,   PSYCH: mentation appears normal, affect normal/bright    Office Visit on 11/12/2021   Component Date Value Ref Range Status     Cholesterol 11/12/2021 168  <=199 mg/dL Final     Triglycerides 11/12/2021 68  <=149 mg/dL Final     Direct Measure HDL 11/12/2021 68  >=50 mg/dL Final    HDL Cholesterol Reference Range:     0-2 years:   No reference ranges established for patients under 2 years old  at [email or web address] for lipid analytes.    2-8 years:  Greater than 45 mg/dL     18 years and older:   Female: Greater than or equal to 50 mg/dL   Male:   Greater than or equal to 40 mg/dL     LDL Cholesterol Calculated 11/12/2021 86  <=129 mg/dL Final     Patient Fasting > 8hrs? 11/12/2021 Unknown   Final     Sodium 11/12/2021 140  136 - 145 mmol/L Final     Potassium 11/12/2021 4.2  3.5 - 5.0 mmol/L Final     Chloride 11/12/2021 106  98 - 107 mmol/L Final     Carbon Dioxide (CO2) 11/12/2021 26  22 - 31 mmol/L Final     Anion Gap 11/12/2021 8  5 - 18 " mmol/L Final     Urea Nitrogen 11/12/2021 10  8 - 22 mg/dL Final     Creatinine 11/12/2021 0.75  0.60 - 1.10 mg/dL Final     Calcium 11/12/2021 9.9  8.5 - 10.5 mg/dL Final     Glucose 11/12/2021 83  70 - 125 mg/dL Final     Alkaline Phosphatase 11/12/2021 40 (A) 45 - 120 U/L Final     AST 11/12/2021 15  0 - 40 U/L Final     ALT 11/12/2021 11  0 - 45 U/L Final     Protein Total 11/12/2021 6.6  6.0 - 8.0 g/dL Final     Albumin 11/12/2021 4.1  3.5 - 5.0 g/dL Final     Bilirubin Total 11/12/2021 0.5  0.0 - 1.0 mg/dL Final     GFR Estimate 11/12/2021 >90  >60 mL/min/1.73m2 Final    As of July 11, 2021, eGFR is calculated by the CKD-EPI creatinine equation, without race adjustment. eGFR can be influenced by muscle mass, exercise, and diet. The reported eGFR is an estimation only and is only applicable if the renal function is stable.     WBC Count 11/12/2021 5.8  4.0 - 11.0 10e3/uL Final     RBC Count 11/12/2021 4.09  3.80 - 5.20 10e6/uL Final     Hemoglobin 11/12/2021 13.7  11.7 - 15.7 g/dL Final     Hematocrit 11/12/2021 40.4  35.0 - 47.0 % Final     MCV 11/12/2021 99  78 - 100 fL Final     MCH 11/12/2021 33.5 (A) 26.5 - 33.0 pg Final     MCHC 11/12/2021 33.9  31.5 - 36.5 g/dL Final     RDW 11/12/2021 11.9  10.0 - 15.0 % Final     Platelet Count 11/12/2021 195  150 - 450 10e3/uL Final     % Neutrophils 11/12/2021 61  % Final     % Lymphocytes 11/12/2021 30  % Final     % Monocytes 11/12/2021 7  % Final     % Eosinophils 11/12/2021 1  % Final     % Basophils 11/12/2021 1  % Final     % Immature Granulocytes 11/12/2021 0  % Final     Absolute Neutrophils 11/12/2021 3.6  1.6 - 8.3 10e3/uL Final     Absolute Lymphocytes 11/12/2021 1.8  0.8 - 5.3 10e3/uL Final     Absolute Monocytes 11/12/2021 0.4  0.0 - 1.3 10e3/uL Final     Absolute Eosinophils 11/12/2021 0.1  0.0 - 0.7 10e3/uL Final     Absolute Basophils 11/12/2021 0.0  0.0 - 0.2 10e3/uL Final     Absolute Immature Granulocytes 11/12/2021 0.0  <=0.0 10e3/uL Final

## 2022-04-13 ENCOUNTER — OFFICE VISIT (OUTPATIENT)
Dept: INTERNAL MEDICINE | Facility: CLINIC | Age: 27
End: 2022-04-13
Payer: COMMERCIAL

## 2022-04-13 VITALS
BODY MASS INDEX: 22.69 KG/M2 | SYSTOLIC BLOOD PRESSURE: 112 MMHG | DIASTOLIC BLOOD PRESSURE: 60 MMHG | RESPIRATION RATE: 16 BRPM | HEIGHT: 65 IN | HEART RATE: 68 BPM | TEMPERATURE: 98.7 F | WEIGHT: 136.2 LBS

## 2022-04-13 DIAGNOSIS — R49.8 VOICE STRAIN: Primary | ICD-10-CM

## 2022-04-13 DIAGNOSIS — R59.1 LYMPHADENOPATHY: ICD-10-CM

## 2022-04-13 DIAGNOSIS — R07.89 SENSATION OF CHEST PRESSURE: ICD-10-CM

## 2022-04-13 DIAGNOSIS — R09.89 THROAT FULLNESS: ICD-10-CM

## 2022-04-13 PROCEDURE — 99214 OFFICE O/P EST MOD 30 MIN: CPT | Performed by: NURSE PRACTITIONER

## 2022-04-13 ASSESSMENT — ANXIETY QUESTIONNAIRES: GAD7 TOTAL SCORE: 0

## 2022-04-13 ASSESSMENT — PAIN SCALES - GENERAL: PAINLEVEL: NO PAIN (0)

## 2022-04-13 ASSESSMENT — PATIENT HEALTH QUESTIONNAIRE - PHQ9: SUM OF ALL RESPONSES TO PHQ QUESTIONS 1-9: 0

## 2022-04-29 ENCOUNTER — OFFICE VISIT (OUTPATIENT)
Dept: OTOLARYNGOLOGY | Facility: CLINIC | Age: 27
End: 2022-04-29
Attending: NURSE PRACTITIONER
Payer: COMMERCIAL

## 2022-04-29 ENCOUNTER — MEDICAL CORRESPONDENCE (OUTPATIENT)
Dept: HEALTH INFORMATION MANAGEMENT | Facility: CLINIC | Age: 27
End: 2022-04-29

## 2022-04-29 DIAGNOSIS — R29.898 NECK TIGHTNESS: ICD-10-CM

## 2022-04-29 DIAGNOSIS — R59.1 LYMPHADENOPATHY: Primary | ICD-10-CM

## 2022-04-29 PROCEDURE — 99203 OFFICE O/P NEW LOW 30 MIN: CPT | Performed by: OTOLARYNGOLOGY

## 2022-04-29 NOTE — LETTER
4/29/2022         RE: Alicia Reyes  [address]  [place] [address]        Dear Colleague,    Thank you for referring your patient, Alicia Reyes, to the [facility]. Please see a copy of my visit note below.    HPI: This patient is a 27yo F who presents to clinic for evaluation of a neck pain and lymph nodes at the request of Annabelle Olvera NP. She has been dealing with some ear fullness on both sides and neck tightness. It is not really a swallowing issue or globus sensation. She does wear invisalign retainers at night, but suspects that she probably does clench. She had an U/S done on her neck a year ago. Denies fevers, unintentional weight loss, odynophagia, dysphagia, hemoptysis, voice changes, and shortness of breath.     Past medical history, surgical history, social history, family history, medications, and allergies have been reviewed with the patient and are documented above.    Review of Systems: a 10-system review was performed. Pertinent positives are noted in the HPI and on a separate scanned document in the chart.    PHYSICAL EXAMINATION:  GEN: no acute distress, normocephalic  EYES: extraocular movements are intact, pupils are equal and round. Sclera clear.   EARS: auricles are normally formed. The external auditory canals are clear with minimal to no cerumen. Tympanic membranes are intact bilaterally with no signs of infection, effusion, retractions, or perforations.  NOSE: anterior nares are patent. There are no masses or lesions. The septum is non-obstructing.  OC/OP: clear, dentition is in good repair but with wear patterns suggestive of clenching. The tongue and palate are fully mobile and symmetric. No masses or lesions.  NECK: soft and supple. No concerning lymphadenopathy or masses. Airway is midline. Bilateral TMJ tenderness with L>R tightness of the SCM  NEURO: CN VII and XII symmetric. alert and oriented. No spontaneous nystagmus. Gait is  normal.  PULM: breathing comfortably on room air, normal chest expansion with respiration  CARDS: no cyanosis or clubbing, normal carotid pulses    NECK U/S 3/21: report reviewed and my interpretation is that these lymph nodes are not enlarged by size criteria in the MARTA chain and do not require further workup.  FINDINGS: The palpable lumps in the right and left neck correspond to benign reactive lymph nodes with the largest on the right 1.2 x 1.1 x 0.3 cm and the largest on the left 1.0 x 1.0 x 0.2 cm. In the absence of concerning imaging findings recommend clinical follow-up.    MEDICAL DECISION-MAKING: This patient is a 27yo F with ear discomfort/neck muscle tightness from TMD. Discussed soft diet, NSAIDS, and having her retainer remade to serve dual purpose as also a bite guard. TMJ clinic referral made.          Again, thank you for allowing me to participate in the care of your patient.        Sincerely,        Mily Lopez MD

## 2022-04-29 NOTE — PROGRESS NOTES
HPI: This patient is a 25yo F who presents to clinic for evaluation of a neck pain and lymph nodes at the request of Annabelle Olvera NP. She has been dealing with some ear fullness on both sides and neck tightness. It is not really a swallowing issue or globus sensation. She does wear invisalign retainers at night, but suspects that she probably does clench. She had an U/S done on her neck a year ago. Denies fevers, unintentional weight loss, odynophagia, dysphagia, hemoptysis, voice changes, and shortness of breath.     Past medical history, surgical history, social history, family history, medications, and allergies have been reviewed with the patient and are documented above.    Review of Systems: a 10-system review was performed. Pertinent positives are noted in the HPI and on a separate scanned document in the chart.    PHYSICAL EXAMINATION:  GEN: no acute distress, normocephalic  EYES: extraocular movements are intact, pupils are equal and round. Sclera clear.   EARS: auricles are normally formed. The external auditory canals are clear with minimal to no cerumen. Tympanic membranes are intact bilaterally with no signs of infection, effusion, retractions, or perforations.  NOSE: anterior nares are patent. There are no masses or lesions. The septum is non-obstructing.  OC/OP: clear, dentition is in good repair but with wear patterns suggestive of clenching. The tongue and palate are fully mobile and symmetric. No masses or lesions.  NECK: soft and supple. No concerning lymphadenopathy or masses. Airway is midline. Bilateral TMJ tenderness with L>R tightness of the SCM  NEURO: CN VII and XII symmetric. alert and oriented. No spontaneous nystagmus. Gait is normal.  PULM: breathing comfortably on room air, normal chest expansion with respiration  CARDS: no cyanosis or clubbing, normal carotid pulses    NECK U/S 3/21: report reviewed and my interpretation is that these lymph nodes are not enlarged by size criteria  in the MARTA chain and do not require further workup.  FINDINGS: The palpable lumps in the right and left neck correspond to benign reactive lymph nodes with the largest on the right 1.2 x 1.1 x 0.3 cm and the largest on the left 1.0 x 1.0 x 0.2 cm. In the absence of concerning imaging findings recommend clinical follow-up.    MEDICAL DECISION-MAKING: This patient is a 27yo F with ear discomfort/neck muscle tightness from TMD. Discussed soft diet, NSAIDS, and having her retainer remade to serve dual purpose as also a bite guard. TMJ clinic referral made.

## 2022-07-01 ENCOUNTER — OFFICE VISIT (OUTPATIENT)
Dept: URGENT CARE | Facility: URGENT CARE | Age: 27
End: 2022-07-01
Payer: COMMERCIAL

## 2022-07-01 VITALS
RESPIRATION RATE: 18 BRPM | SYSTOLIC BLOOD PRESSURE: 126 MMHG | TEMPERATURE: 98.8 F | HEART RATE: 88 BPM | DIASTOLIC BLOOD PRESSURE: 84 MMHG | OXYGEN SATURATION: 99 %

## 2022-07-01 DIAGNOSIS — T67.5XXA HEAT EXHAUSTION, INITIAL ENCOUNTER: Primary | ICD-10-CM

## 2022-07-01 DIAGNOSIS — R11.0 NAUSEA: ICD-10-CM

## 2022-07-01 DIAGNOSIS — R42 LIGHTHEADEDNESS: ICD-10-CM

## 2022-07-01 LAB
ALBUMIN SERPL-MCNC: 4.1 G/DL (ref 3.4–5)
ALBUMIN UR-MCNC: NEGATIVE MG/DL
ALP SERPL-CCNC: 51 U/L (ref 40–150)
ALT SERPL W P-5'-P-CCNC: 21 U/L (ref 0–50)
ANION GAP SERPL CALCULATED.3IONS-SCNC: 7 MMOL/L (ref 3–14)
APPEARANCE UR: CLEAR
AST SERPL W P-5'-P-CCNC: 17 U/L (ref 0–45)
BACTERIA #/AREA URNS HPF: ABNORMAL /HPF
BILIRUB SERPL-MCNC: 1 MG/DL (ref 0.2–1.3)
BILIRUB UR QL STRIP: NEGATIVE
BUN SERPL-MCNC: 9 MG/DL (ref 7–30)
CALCIUM SERPL-MCNC: 9.8 MG/DL (ref 8.5–10.1)
CHLORIDE BLD-SCNC: 103 MMOL/L (ref 94–109)
CO2 SERPL-SCNC: 28 MMOL/L (ref 20–32)
COLOR UR AUTO: YELLOW
CREAT SERPL-MCNC: 0.5 MG/DL (ref 0.52–1.04)
ERYTHROCYTE [DISTWIDTH] IN BLOOD BY AUTOMATED COUNT: 11.9 % (ref 10–15)
GFR SERPL CREATININE-BSD FRML MDRD: >90 ML/MIN/1.73M2
GLUCOSE BLD-MCNC: 96 MG/DL (ref 70–99)
GLUCOSE UR STRIP-MCNC: NEGATIVE MG/DL
HCT VFR BLD AUTO: 41.5 % (ref 35–47)
HGB BLD-MCNC: 14.2 G/DL (ref 11.7–15.7)
HGB UR QL STRIP: NEGATIVE
KETONES UR STRIP-MCNC: NEGATIVE MG/DL
LEUKOCYTE ESTERASE UR QL STRIP: ABNORMAL
MCH RBC QN AUTO: 34.1 PG (ref 26.5–33)
MCHC RBC AUTO-ENTMCNC: 34.2 G/DL (ref 31.5–36.5)
MCV RBC AUTO: 100 FL (ref 78–100)
NITRATE UR QL: NEGATIVE
PH UR STRIP: 5 [PH] (ref 5–7)
PLATELET # BLD AUTO: 219 10E3/UL (ref 150–450)
POTASSIUM BLD-SCNC: 4.1 MMOL/L (ref 3.4–5.3)
PROT SERPL-MCNC: 7.4 G/DL (ref 6.8–8.8)
RBC # BLD AUTO: 4.16 10E6/UL (ref 3.8–5.2)
RBC #/AREA URNS AUTO: ABNORMAL /HPF
SODIUM SERPL-SCNC: 138 MMOL/L (ref 133–144)
SP GR UR STRIP: <=1.005 (ref 1–1.03)
SQUAMOUS #/AREA URNS AUTO: ABNORMAL /LPF
UROBILINOGEN UR STRIP-ACNC: 0.2 E.U./DL
WBC # BLD AUTO: 8.2 10E3/UL (ref 4–11)
WBC #/AREA URNS AUTO: ABNORMAL /HPF

## 2022-07-01 PROCEDURE — 81001 URINALYSIS AUTO W/SCOPE: CPT | Performed by: FAMILY MEDICINE

## 2022-07-01 PROCEDURE — 36415 COLL VENOUS BLD VENIPUNCTURE: CPT | Performed by: FAMILY MEDICINE

## 2022-07-01 PROCEDURE — 85027 COMPLETE CBC AUTOMATED: CPT | Performed by: FAMILY MEDICINE

## 2022-07-01 PROCEDURE — 80053 COMPREHEN METABOLIC PANEL: CPT | Performed by: FAMILY MEDICINE

## 2022-07-01 PROCEDURE — 99214 OFFICE O/P EST MOD 30 MIN: CPT | Performed by: FAMILY MEDICINE

## 2022-07-01 RX ORDER — ONDANSETRON 4 MG/1
4 TABLET, FILM COATED ORAL EVERY 8 HOURS PRN
Qty: 60 TABLET | Refills: 0 | Status: SHIPPED | OUTPATIENT
Start: 2022-07-01 | End: 2022-07-15

## 2022-07-01 ASSESSMENT — PAIN SCALES - GENERAL: PAINLEVEL: NO PAIN (0)

## 2022-07-01 NOTE — PROGRESS NOTES
SUBJECTIVE:   Alicia Reyes is a 26 year old female presenting with a chief complaint of feeling like passing out after being outside in the high heat and humidity for about one hour wo days ago She had cold sweats that day. .  Patient has felt shaky and nauseous and lightheadedness since then.  Patient has spent time in air conditioning since two days ago.      Patient has been on Spironolactone for the past year.  Patient denies drinking alcohol two days ago.      Treatment measures tried include drinking liquids, spending time in air conditioning.  Predisposing factors include none.     Past Medical History:   Acne Vulgaris.  Patient had one episode of panic attack     Current Outpatient Medications   Medication Sig Dispense Refill     Multiple Vitamin (MULTIVITAMIN PO)        spironolactone (ALDACTONE) 50 MG tablet Take 1 tablet by mouth daily       Social History     Tobacco Use     Smoking status: Never Smoker     Smokeless tobacco: Never Used   Substance Use Topics     Alcohol use: Yes     Alcohol/week: 1.0 standard drink     Types: 1 Standard drinks or equivalent per week     Comment: Alcoholic Drinks/day: Social        ROS:  CONSTITUTIONAL:negative for fevers.   INTEGUMENTARY/SKIN:  Positive for recent clammy skin.   ENT/MOUTH: negative for ear nose throat problems.   GI: positive for persistent nausea.    MUSCULOSKELETAL: No generalized muscle aches.     OBJECTIVE:  /84   Pulse 88   Temp 98.8  F (37.1  C) (Tympanic)   Resp 18   LMP  (LMP Unknown)   SpO2 99%   Breastfeeding No   GENERAL APPEARANCE: healthy, alert and no distress.  No acute respiratory distress.    EYES:  Moist.  No scleral icterus.    HENT: moist mouth.    RESP: lungs clear to auscultation - no rales, rhonchi or wheezes  CV: regular rates and rhythm, normal S1 S2, no murmur noted  SKIN: no diaphoresis/pallor/cyanosis.      LABS:    Results for orders placed or performed in visit on 07/01/22   UA macro with reflex to Microscopic  and Culture - Clinc Collect     Status: Abnormal    Specimen: Urine, Midstream   Result Value Ref Range    Color Urine Yellow Colorless, Straw, Light Yellow, Yellow    Appearance Urine Clear Clear    Glucose Urine Negative Negative mg/dL    Bilirubin Urine Negative Negative    Ketones Urine Negative Negative mg/dL    Specific Gravity Urine <=1.005 1.003 - 1.035    Blood Urine Negative Negative    pH Urine 5.0 5.0 - 7.0    Protein Albumin Urine Negative Negative mg/dL    Urobilinogen Urine 0.2 0.2, 1.0 E.U./dL    Nitrite Urine Negative Negative    Leukocyte Esterase Urine Trace (A) Negative   CBC with platelets     Status: Abnormal   Result Value Ref Range    WBC Count 8.2 4.0 - 11.0 10e3/uL    RBC Count 4.16 3.80 - 5.20 10e6/uL    Hemoglobin 14.2 11.7 - 15.7 g/dL    Hematocrit 41.5 35.0 - 47.0 %     78 - 100 fL    MCH 34.1 (H) 26.5 - 33.0 pg    MCHC 34.2 31.5 - 36.5 g/dL    RDW 11.9 10.0 - 15.0 %    Platelet Count 219 150 - 450 10e3/uL   Comprehensive metabolic panel     Status: Abnormal   Result Value Ref Range    Sodium 138 133 - 144 mmol/L    Potassium 4.1 3.4 - 5.3 mmol/L    Chloride 103 94 - 109 mmol/L    Carbon Dioxide (CO2) 28 20 - 32 mmol/L    Anion Gap 7 3 - 14 mmol/L    Urea Nitrogen 9 7 - 30 mg/dL    Creatinine 0.50 (L) 0.52 - 1.04 mg/dL    Calcium 9.8 8.5 - 10.1 mg/dL    Glucose 96 70 - 99 mg/dL    Alkaline Phosphatase 51 40 - 150 U/L    AST 17 0 - 45 U/L    ALT 21 0 - 50 U/L    Protein Total 7.4 6.8 - 8.8 g/dL    Albumin 4.1 3.4 - 5.0 g/dL    Bilirubin Total 1.0 0.2 - 1.3 mg/dL    GFR Estimate >90 >60 mL/min/1.73m2   Urine Microscopic     Status: Abnormal   Result Value Ref Range    Bacteria Urine Few (A) None Seen /HPF    RBC Urine 0-2 0-2 /HPF /HPF    WBC Urine 0-5 0-5 /HPF /HPF    Squamous Epithelials Urine Few (A) None Seen /LPF    Narrative    Urine Culture not indicated         ASSESSMENT:  Heat Exhaustion  Lightheadedness  Nausea    Today's basic metabolic panel shows no electrolyte  abnormalities.  No obvious signs of dehydration on today's UA, BMP and today's physical exam.      PLAN:  For the heat exhaustion:  Avoid alcohol until you feel better.    Drink plenty of water until your urine is clear and colorless.    follow up with the primary care provider if not better in one week.      For the nausea:  Rx:  Ondansetron  follow up with the primary care provider if not better in one week.    Eat a soft, mushy bland diet such as bananas, apple sauce, yogurt, toast, pasta, rice, hard boiled egg.      Jose Howell MD

## 2022-07-01 NOTE — PATIENT INSTRUCTIONS
Avoid alcohol until you feel better.      Drink plenty of water until your urine is clear and colorless.      Eat a soft, mushy bland diet such as bananas, apple sauce, yogurt, toast, pasta, rice, hard boiled egg.      Follow up with your primary care provider if not better in one week.

## 2022-07-01 NOTE — LETTER
[facility] [place]  [address]  [address]  [place] [address] [phone]      July 1, 2022    RE:  Alicia Reyes                                                                                                                                                       [address]  [place] [address]            To whom it may concern:    Alicia Reyes is under my professional care at the [facility] on July 1, 2022.  Because of her current medical problem, please excuse her work absences on June 30 and July 1, 2022.   She  may return to work on July 5, 2022, provided that she is feeling better.         Sincerely,        Jose Howell MD    [facility]

## 2022-07-02 ENCOUNTER — HOSPITAL ENCOUNTER (EMERGENCY)
Facility: CLINIC | Age: 27
Discharge: HOME | End: 2022-07-02
Attending: EMERGENCY MEDICINE | Admitting: EMERGENCY MEDICINE
Payer: COMMERCIAL

## 2022-07-02 VITALS
TEMPERATURE: 97.9 F | SYSTOLIC BLOOD PRESSURE: 122 MMHG | OXYGEN SATURATION: 99 % | RESPIRATION RATE: 16 BRPM | HEART RATE: 72 BPM | DIASTOLIC BLOOD PRESSURE: 79 MMHG

## 2022-07-02 DIAGNOSIS — R11.0 NAUSEA: ICD-10-CM

## 2022-07-02 DIAGNOSIS — R19.7 DIARRHEA, UNSPECIFIED TYPE: ICD-10-CM

## 2022-07-02 LAB
ALBUMIN SERPL-MCNC: 4.1 G/DL (ref 3.4–5)
ALP SERPL-CCNC: 38 U/L (ref 40–150)
ALT SERPL W P-5'-P-CCNC: 21 U/L (ref 0–50)
ANION GAP SERPL CALCULATED.3IONS-SCNC: 4 MMOL/L (ref 3–14)
AST SERPL W P-5'-P-CCNC: 11 U/L (ref 0–45)
BASOPHILS # BLD AUTO: 0 10E3/UL (ref 0–0.2)
BASOPHILS NFR BLD AUTO: 0 %
BILIRUB DIRECT SERPL-MCNC: 0.2 MG/DL (ref 0–0.2)
BILIRUB SERPL-MCNC: 0.6 MG/DL (ref 0.2–1.3)
BUN SERPL-MCNC: 7 MG/DL (ref 7–30)
CALCIUM SERPL-MCNC: 9.2 MG/DL (ref 8.5–10.1)
CHLORIDE BLD-SCNC: 107 MMOL/L (ref 94–109)
CO2 SERPL-SCNC: 27 MMOL/L (ref 20–32)
CREAT SERPL-MCNC: 0.71 MG/DL (ref 0.52–1.04)
EOSINOPHIL # BLD AUTO: 0 10E3/UL (ref 0–0.7)
EOSINOPHIL NFR BLD AUTO: 0 %
ERYTHROCYTE [DISTWIDTH] IN BLOOD BY AUTOMATED COUNT: 11.9 % (ref 10–15)
GFR SERPL CREATININE-BSD FRML MDRD: >90 ML/MIN/1.73M2
GLUCOSE BLD-MCNC: 112 MG/DL (ref 70–99)
HCT VFR BLD AUTO: 41.8 % (ref 35–47)
HGB BLD-MCNC: 14 G/DL (ref 11.7–15.7)
HOLD SPECIMEN: NORMAL
IMM GRANULOCYTES # BLD: 0 10E3/UL
IMM GRANULOCYTES NFR BLD: 0 %
LYMPHOCYTES # BLD AUTO: 1 10E3/UL (ref 0.8–5.3)
LYMPHOCYTES NFR BLD AUTO: 14 %
MCH RBC QN AUTO: 33.8 PG (ref 26.5–33)
MCHC RBC AUTO-ENTMCNC: 33.5 G/DL (ref 31.5–36.5)
MCV RBC AUTO: 101 FL (ref 78–100)
MONOCYTES # BLD AUTO: 0.3 10E3/UL (ref 0–1.3)
MONOCYTES NFR BLD AUTO: 4 %
NEUTROPHILS # BLD AUTO: 6.1 10E3/UL (ref 1.6–8.3)
NEUTROPHILS NFR BLD AUTO: 82 %
NRBC # BLD AUTO: 0 10E3/UL
NRBC BLD AUTO-RTO: 0 /100
PLATELET # BLD AUTO: 212 10E3/UL (ref 150–450)
POTASSIUM BLD-SCNC: 3.8 MMOL/L (ref 3.4–5.3)
PROT SERPL-MCNC: 6.8 G/DL (ref 6.8–8.8)
RBC # BLD AUTO: 4.14 10E6/UL (ref 3.8–5.2)
SODIUM SERPL-SCNC: 138 MMOL/L (ref 133–144)
WBC # BLD AUTO: 7.5 10E3/UL (ref 4–11)

## 2022-07-02 PROCEDURE — 85025 COMPLETE CBC W/AUTO DIFF WBC: CPT | Performed by: EMERGENCY MEDICINE

## 2022-07-02 PROCEDURE — 96361 HYDRATE IV INFUSION ADD-ON: CPT

## 2022-07-02 PROCEDURE — 99284 EMERGENCY DEPT VISIT MOD MDM: CPT | Mod: 25

## 2022-07-02 PROCEDURE — 36415 COLL VENOUS BLD VENIPUNCTURE: CPT | Performed by: EMERGENCY MEDICINE

## 2022-07-02 PROCEDURE — 82248 BILIRUBIN DIRECT: CPT | Performed by: EMERGENCY MEDICINE

## 2022-07-02 PROCEDURE — 96374 THER/PROPH/DIAG INJ IV PUSH: CPT

## 2022-07-02 PROCEDURE — 80053 COMPREHEN METABOLIC PANEL: CPT | Performed by: EMERGENCY MEDICINE

## 2022-07-02 PROCEDURE — 258N000003 HC RX IP 258 OP 636: Performed by: EMERGENCY MEDICINE

## 2022-07-02 PROCEDURE — 250N000011 HC RX IP 250 OP 636: Performed by: EMERGENCY MEDICINE

## 2022-07-02 RX ORDER — ONDANSETRON 2 MG/ML
4 INJECTION INTRAMUSCULAR; INTRAVENOUS ONCE
Status: COMPLETED | OUTPATIENT
Start: 2022-07-02 | End: 2022-07-02

## 2022-07-02 RX ADMIN — ONDANSETRON 4 MG: 2 INJECTION INTRAMUSCULAR; INTRAVENOUS at 14:10

## 2022-07-02 RX ADMIN — SODIUM CHLORIDE 1000 ML: 9 INJECTION, SOLUTION INTRAVENOUS at 11:49

## 2022-07-02 ASSESSMENT — ENCOUNTER SYMPTOMS
ABDOMINAL PAIN: 1
DIARRHEA: 0
SORE THROAT: 0
DYSURIA: 0
NAUSEA: 1
LIGHT-HEADEDNESS: 1
MYALGIAS: 0

## 2022-07-02 NOTE — ED PROVIDER NOTES
History   Chief Complaint:  Nausea     The history is provided by the patient.      Alicia Reyes is a 26 year old female with history of GERD who presents with worsening nausea and lightheadedness since onset 3 days ago. She experienced near-syncope 3 days ago and did not have a resolution of symptoms since then. Additionally, she had yellow stools with mucus but denies diarrhea. She was seen in the  and was provided Zofran.  She experienced slight alleviation of symptoms, however, she woke up this morning with maximum severity in symptoms, prompting her arrival at the ED.  Here in the ED, she reports slight abdominal pain with bowel movements and denies dysuria, myalgias, and sore throat, as well as any recent sick encounters, recent travel, or history of abdominal surgeries.  She notes history of syncope, for which she saw a cardiologist who reported no findings. she also takes spironolactone daily but has not taken it for the last few days.     Review of Systems   HENT: Negative for sore throat.    Gastrointestinal: Positive for abdominal pain and nausea. Negative for diarrhea.        (+) yellow stools with mucus   Genitourinary: Negative for dysuria.   Musculoskeletal: Negative for myalgias.   Neurological: Positive for light-headedness.   All other systems reviewed and are negative.        Allergies:  No Known Allergies    Medications:  Ondansetron  Spironolactone   Propranolol  Omeprazole  Lorazepam     Past Medical History:     Panic attacks   Anxiety   Lymphadenopathy   GERD    Past Surgical History:    [place] tooth extraction     Family History:    Lung disease  Osteoporosis  Heart disease  GI problems  Sever allergy  Lung cancer  Cervical cancer    Social History:  The patient presents with .  The patient presents in a private vehicle.  PCP: Annabelle Olvera, NP    Physical Exam     Patient Vitals for the past 24 hrs:   BP Temp Pulse Resp SpO2   07/02/22 1200 -- -- -- -- 99 %   07/02/22 1027  (!) 134/98 97.9  F (36.6  C) 85 16 98 %       Physical Exam  \  Gen: well appearing, in no acute distress  Oral : Mucous membranes moist,   Nose: No rhinorhea  Ears: External near normal, without drainage  Eyes: periorbital tissues and sclera normal   Neck: supple, no abnormal swelling  Lungs: Clear bilaterally, no tachypnea or distress, speaks full sentences  CV: Regular rate, regular rhythm  Abd: soft, nontender, nondistended, no rebound/guarding  Ext: no lower extremity edema  Skin: warm, dry, well perfused, no rashes/bruising/lesions on exposed skin  Neuro: alert, no gross motor or sensory deficits,   Psych: pleasant mood, normal affect    Emergency Department Course     Laboratory:  Labs Ordered and Resulted from Time of ED Arrival to Time of ED Departure   BASIC METABOLIC PANEL - Abnormal       Result Value    Sodium 138      Potassium 3.8      Chloride 107      Carbon Dioxide (CO2) 27      Anion Gap 4      Urea Nitrogen 7      Creatinine 0.71      Calcium 9.2      Glucose 112 (*)     GFR Estimate >90     CBC WITH PLATELETS AND DIFFERENTIAL - Abnormal    WBC Count 7.5      RBC Count 4.14      Hemoglobin 14.0      Hematocrit 41.8       (*)     MCH 33.8 (*)     MCHC 33.5      RDW 11.9      Platelet Count 212      % Neutrophils 82      % Lymphocytes 14      % Monocytes 4      % Eosinophils 0      % Basophils 0      % Immature Granulocytes 0      NRBCs per 100 WBC 0      Absolute Neutrophils 6.1      Absolute Lymphocytes 1.0      Absolute Monocytes 0.3      Absolute Eosinophils 0.0      Absolute Basophils 0.0      Absolute Immature Granulocytes 0.0      Absolute NRBCs 0.0     HEPATIC FUNCTION PANEL - Abnormal    Bilirubin Total 0.6      Bilirubin Direct 0.2      Protein Total 6.8      Albumin 4.1      Alkaline Phosphatase 38 (*)     AST 11      ALT 21       Emergency Department Course:    Reviewed:  I reviewed nursing notes, vitals, past medical history and Care Everywhere    Assessments:  1212 I obtained  history and examined the patient as noted above.   1550 I rechecked the patient and explained findings.     Interventions:  1149 NS 1 L IV  1410 Ondansetron 4 mg IV    Disposition:  The patient was discharged to home.     Impression & Plan     Medical Decision Making:  Patient presents with nausea and stool changes been going on for about 3 days.  Her exam is very benign, she not having fevers, labs are within normal limits.  No recent travel or antibiotics.  Suspect viral etiology to her symptoms.  Counseled patient I would anticipate improvement over the next 24 to 48 hours she will continue to observe her symptoms return for recheck if worsening pain worsening nausea vomiting or any other new or worsening symptoms he is comfortable with management plan.    Diagnosis:    ICD-10-CM    1. Nausea  R11.0    2. Diarrhea, unspecified type  R19.7        Discharge Medications:  New Prescriptions    No medications on file       Scribe Disclosure:  HOMERO Genibryan Baron, am serving as a scribe at 12:14 PM on 7/2/2022 to document services personally performed by Andre Kellogg MD based on my observations and the provider's statements to me.          Andre Kellogg MD  07/02/22 [id number]

## 2022-07-02 NOTE — ED TRIAGE NOTES
Patient presents to the ED with ongoing nausea and loose stools.  has been having symptoms since had a near syncopal episode on Wednesday. Kiki was seen at urgent care yesterday and had a negative work up and was started on zofran.  is still very nauseated and has been having yellow mucousy stools for the past 3 days.

## 2022-07-13 ENCOUNTER — VIRTUAL VISIT (OUTPATIENT)
Dept: INTERNAL MEDICINE | Facility: CLINIC | Age: 27
End: 2022-07-13
Payer: COMMERCIAL

## 2022-07-13 ENCOUNTER — LAB (OUTPATIENT)
Dept: LAB | Facility: CLINIC | Age: 27
End: 2022-07-13

## 2022-07-13 ENCOUNTER — MYC MEDICAL ADVICE (OUTPATIENT)
Dept: INTERNAL MEDICINE | Facility: CLINIC | Age: 27
End: 2022-07-13

## 2022-07-13 DIAGNOSIS — R53.83 LOW ENERGY: ICD-10-CM

## 2022-07-13 DIAGNOSIS — R11.0 NAUSEA: Primary | ICD-10-CM

## 2022-07-13 DIAGNOSIS — R73.09 ELEVATED GLUCOSE: ICD-10-CM

## 2022-07-13 DIAGNOSIS — R11.0 NAUSEA: ICD-10-CM

## 2022-07-13 LAB — HBA1C MFR BLD: 5.1 % (ref 0–5.6)

## 2022-07-13 PROCEDURE — 99213 OFFICE O/P EST LOW 20 MIN: CPT | Mod: 95 | Performed by: NURSE PRACTITIONER

## 2022-07-13 PROCEDURE — 36415 COLL VENOUS BLD VENIPUNCTURE: CPT

## 2022-07-13 PROCEDURE — 84443 ASSAY THYROID STIM HORMONE: CPT

## 2022-07-13 PROCEDURE — 84702 CHORIONIC GONADOTROPIN TEST: CPT

## 2022-07-13 PROCEDURE — 87338 HPYLORI STOOL AG IA: CPT

## 2022-07-13 PROCEDURE — 83036 HEMOGLOBIN GLYCOSYLATED A1C: CPT

## 2022-07-13 NOTE — PROGRESS NOTES
Alicia is a 27 year old who is being evaluated via a billable video visit.      How would you like to obtain your AVS? MyChart  If the video visit is dropped, the invitation should be resent by: Text to cell phone: [phone]  Will anyone else be joining your video visit? No    Current Outpatient Medications:      Multiple Vitamin (MULTIVITAMIN PO), , Disp: , Rfl:      ondansetron (ZOFRAN) 4 MG tablet, Take 1 tablet (4 mg) by mouth every 8 hours as needed for nausea or vomiting, Disp: 60 tablet, Rfl: 0     spironolactone (ALDACTONE) 50 MG tablet, Take 1 tablet by mouth daily (Patient not taking: Reported on 7/13/2022), Disp: , Rfl:   No past medical history on file.        Assessment & Plan   Problem List Items Addressed This Visit    None     Visit Diagnoses     Nausea    -  Primary    Relevant Orders    Helicobacter pylori Antigen Stool    HCG quantitative pregnancy    Elevated glucose        Relevant Orders    Hemoglobin A1c    Low energy        Relevant Orders    TSH with free T4 reflex         Recommend small frequent meals adequate fluid intake patient to complete labs and will be seen in clinic tomorrow for follow-up and review of diagnostic testing               No follow-ups on file.    Annabelle Olvera NP  [facility]   Alicia is a 27 year old, presenting for the following health issues:  No chief complaint on file.      History of Present Illness       Reason for visit:  Recent near syncope followed bu nausea, dizziness, fatigue, loss of appetite  Symptom onset:  1-2 weeks ago  Symptoms include:  Nausea, lightheaded, fatigue, loss of appetite, weakness  Symptom intensity:  Severe  Symptom progression:  Staying the same  Had these symptoms before:  No  What makes it worse:  Physical activity  What makes it better:  No    She eats 2-3 servings of fruits and vegetables daily.She consumes 0 sweetened beverage(s) daily.She exercises with enough effort to increase her heart rate  30 to 60 minutes per day.  She exercises with enough effort to increase her heart rate 3 or less days per week.   She is taking medications regularly.     Due for period today.      Nausea in the morning, not hungry for breakfast but will eat.  Reports underlying nausea throughout the day, general fatigue, low enery.     Increase in belching and burping over the last couple of days which is worse after work and in the morning.            Review of Systems   Unremarkable other than listed above and below         Objective           Vitals:  No vitals were obtained today due to virtual visit.    Physical Exam   GENERAL: Healthy, alert and no distress  EYES: Eyes grossly normal to inspection.  No discharge or erythema, or obvious scleral/conjunctival abnormalities.  RESP: No audible wheeze, cough, or visible cyanosis.  No visible retractions or increased work of breathing.    SKIN: Visible skin clear. No significant rash, abnormal pigmentation or lesions.  NEURO: Cranial nerves grossly intact.  Mentation and speech appropriate for age.  PSYCH: Mentation appears normal, affect normal/bright, judgement and insight intact, normal speech and appearance well-groomed.                Video-Visit Details    Video Start Time: 7:46 am     Type of service:  Video Visit    Video End Time:7:56 AM    Originating Location (pt. Location): Home    Distant Location (provider location):  [facility]     Platform used for Video Visit: JustShareIt    .  ..

## 2022-07-14 LAB
B-HCG SERPL-ACNC: <1 IU/L (ref 0–5)
TSH SERPL DL<=0.005 MIU/L-ACNC: 0.98 MU/L (ref 0.4–4)

## 2022-07-15 ENCOUNTER — E-VISIT (OUTPATIENT)
Dept: FAMILY MEDICINE | Facility: CLINIC | Age: 27
End: 2022-07-15
Payer: COMMERCIAL

## 2022-07-15 ENCOUNTER — OFFICE VISIT (OUTPATIENT)
Dept: URGENT CARE | Facility: URGENT CARE | Age: 27
End: 2022-07-15
Payer: COMMERCIAL

## 2022-07-15 VITALS
SYSTOLIC BLOOD PRESSURE: 102 MMHG | HEART RATE: 61 BPM | TEMPERATURE: 99.2 F | DIASTOLIC BLOOD PRESSURE: 72 MMHG | OXYGEN SATURATION: 100 %

## 2022-07-15 DIAGNOSIS — R19.7 DIARRHEA, UNSPECIFIED TYPE: Primary | ICD-10-CM

## 2022-07-15 LAB
ALBUMIN UR-MCNC: NEGATIVE MG/DL
APPEARANCE UR: CLEAR
BILIRUB UR QL STRIP: NEGATIVE
COLOR UR AUTO: YELLOW
GLUCOSE UR STRIP-MCNC: NEGATIVE MG/DL
H PYLORI AG STL QL IA: NEGATIVE
HGB UR QL STRIP: NEGATIVE
KETONES UR STRIP-MCNC: 15 MG/DL
LEUKOCYTE ESTERASE UR QL STRIP: NEGATIVE
NITRATE UR QL: NEGATIVE
PH UR STRIP: 5 [PH] (ref 5–7)
SP GR UR STRIP: 1.01 (ref 1–1.03)
UROBILINOGEN UR STRIP-ACNC: 0.2 E.U./DL

## 2022-07-15 PROCEDURE — 99207 PR NON-BILLABLE SERV PER CHARTING: CPT | Performed by: PHYSICIAN ASSISTANT

## 2022-07-15 PROCEDURE — 99213 OFFICE O/P EST LOW 20 MIN: CPT | Performed by: PHYSICIAN ASSISTANT

## 2022-07-15 PROCEDURE — 81003 URINALYSIS AUTO W/O SCOPE: CPT | Performed by: PHYSICIAN ASSISTANT

## 2022-07-15 NOTE — PATIENT INSTRUCTIONS
Dear Alicia Reyes,    We are sorry you are not feeling well. Based on the responses you provided, it is recommended that you be seen in-person in urgent care so we can better evaluate your symptoms. Please click here to find the nearest urgent care location to you.   You will not be charged for this Visit. Thank you for trusting us with your care.    Mariam Purdy PA-C      Diarrhea with Uncertain Cause (Adult)    Diarrhea is when stools are loose and watery. This can be caused by:    Viral infections    Bacterial infections    Food poisoning    Parasites    Irritable bowel syndrome (IBS)    Inflammatory bowel diseases such as ulcerative colitis, Crohn's disease, and celiac disease    Food intolerance, such as to lactose, the sugar found in milk and milk products    Reaction to medicines like antibiotics, laxatives, cancer drugs, and antacids  Along with diarrhea, you may also have:    Abdominal pain and cramping    Nausea and vomiting    Loss of bowel control    Fever and chills    Bloody stools  In some cases, antibiotics may help to treat diarrhea. You may have a stool sample test. This is done to see what is causing your diarrhea, and if antibiotics will help treat it. The results of a stool sample test may take up to 2 days. The healthcare provider may not give you antibiotics until he or she has the stool test results.  Diarrhea can cause dehydration. This is the loss of too much water and other fluids from the body. When this occurs, body fluid must be replaced. This can be done with oral rehydration solutions. Oral rehydration solutions are available at drugstores and grocery stores without a prescription. Sports drinks are not the best choice if you are very dehydrated. They have too much sugar and not enough electrolytes.  Home care  Follow all instructions given by your healthcare provider. Rest at home for the next 24 hours, or until you feel better. Avoid caffeine, tobacco, and alcohol. These  can make diarrhea, cramping, and pain worse.  If taking medicines:    Over-the-counter nausea and diarrhea medicines are generally OK unless you experience fever or blood stool. Check with your doctor first in those circumstances.    You may use acetaminophen or NSAID medicines like ibuprofen or naproxen to reduce pain and fever. Don t use these if you have chronic liver or kidney disease, or ever had a stomach ulcer or gastrointestinal bleeding. Don't use NSAID medicines if you are already taking one for another condition (like arthritis) or are on daily aspirin therapy (such as for heart disease or after a stroke). Talk with your healthcare provider first.    If antibiotics were prescribed, be sure you take them until they are finished. Don t stop taking them even when you feel better. Antibiotics must be taken as a full course.  To prevent the spread of illness:    Remember that washing with soap and water and using alcohol-based  is the best way to prevent the spread of infection. Dry your hands with a single use towel (like a paper towel).    Clean the toilet after each use.    Wash your hands before eating.    Wash your hands before and after preparing food. Keep in mind that people with diarrhea or vomiting should not prepare food for others.    Wash your hands after using cutting boards, countertops, and knives that have been in contact with raw foods.    Wash and then peel fruits and vegetables.    Keep uncooked meats away from cooked and ready-to-eat foods.    Use a food thermometer when cooking. Cook poultry to at least 165 F (74 C). Cook ground meat (beef, veal, pork, lamb) to at least 160 F (71 C). Cook fresh beef, veal, lamb, and pork to at least 145 F (63 C).    Don t eat raw or undercooked eggs (poached or kayla side up), poultry, meat, or unpasteurized milk and juices.  Food and drinks  The main goal while treating vomiting or diarrhea is to prevent dehydration. This is done by taking small  amounts of liquids often.    Keep in mind that liquids are more important than food right now.    Drink only small amounts of liquids at a time.    Don t force yourself to eat, especially if you are having cramping, vomiting, or diarrhea. Don t eat large amounts at a time, even if you are hungry.    If you eat, avoid fatty, greasy, spicy, or fried foods.    Don t eat dairy foods or drink milk if you have diarrhea. These can make diarrhea worse.  During the first 24 hours you can try:    Oral rehydration solutions.  Sports drinks may be used if you are not too dehydrated and are otherwise healthy.    Soft drinks without caffeine    Ginger ale    Water (plain or flavored)    Decaf tea or coffee    Clear broth, consommé, or bouillon    Gelatin, popsicles, or frozen fruit juice bars  The second 24 hours, if you are feeling better, you can add:    Hot cereal, plain toast, bread, rolls, or crackers    Plain noodles, rice, mashed potatoes, chicken noodle soup, or rice soup    Unsweetened canned fruit (no pineapple)    Bananas  As you recover:    Limit fat intake to less than 15 grams per day. Don t eat margarine, butter, oils, mayonnaise, sauces, gravies, fried foods, peanut butter, meat, poultry, or fish.    Limit fiber. Don t eat raw or cooked vegetables, fresh fruits except bananas, or bran cereals.    Limit caffeine and chocolate.    Limit dairy.    Don t use spices or seasonings except salt.    Go back to your normal diet over time, as you feel better and your symptoms improve.    If the symptoms come back, go back to a simple diet or clear liquids.  Follow-up care  Follow up with your healthcare provider, or as advised. If a stool sample was taken or cultures were done, call the healthcare provider for the results as instructed.  Call 911  Call 911 if you have any of these symptoms:    Trouble breathing    Confusion    Extreme drowsiness or trouble walking    Loss of consciousness    Rapid heart rate    Chest  pain    Stiff neck    Seizure  When to seek medical advice  Call your healthcare provider right away if any of these occur:    Abdominal pain that gets worse    Constant lower right abdominal pain    Continued vomiting and inability to keep liquids down    Diarrhea more than 5 times a day    Blood in vomit or stool    Dark urine or no urine for 8 hours, dry mouth and tongue, tiredness, weakness, or dizziness    Drowsiness    New rash    You don t get better in 2 to 3 days    Fever of 100.4 F (38 C) or higher, or as directed by your healthcare provider  Mois[employer] last reviewed this educational content on 6/1/2018 2000-2021 [employer]. All rights reserved. This information is not intended as a substitute for professional medical care. Always follow your healthcare professional's instructions.

## 2022-07-15 NOTE — PROGRESS NOTES
Assessment & Plan     1. Diarrhea, unspecified type  Suspect protracted diarrhea is related to viral GI illness now with sustained inflammation.   Will do stool cultures -- call if any are abnormal  Previous CBC and CMP have been normal.   Denies pregnancy  Discussed follow-up with GI in one week if symptoms are not improving as expected  If development of severe abd pain, weakness etc to ER  - Clostridium difficile Toxin B PCR; Future  - Ova and Parasite Exam Routine; Future  - Cryptosporidium/Giardia Immunoassay; Future  - Enteric Bacteria and Virus Panel by JOSE Stool; Future  - UA macro with reflex to Microscopic and Culture - Clinc Collect  - Adult GI  Referral - Consult Only; Future  - Clostridium difficile Toxin B PCR  - Ova and Parasite Exam Routine  - Cryptosporidium/Giardia Immunoassay  - Enteric Bacteria and Virus Panel by JOSE Stool        Return in about 1 week (around 7/22/2022), or if symptoms worsen or fail to improve.    Diagnosis and treatment plan was reviewed with patient and/or family.   We went over any labs or imaging. Discussed worsening symptoms or little to no relief despite treatment plan to follow-up with GI  Patient verbalizes understanding. All questions were addressed and answered.     Isa Haney PA-C  [facility]    CHIEF COMPLAINT:   Chief Complaint   Patient presents with     Diarrhea     2 weeks, nausea, weakness     Subjective     Alicia is a 27 year old female who presents to clinic today for evaluation. Patient has had yellow mucous stool for the past 2 weeks. She has had intermittent episodes where her stool was normal and formed, typically this is in the AM. Additionally, she feels nauseated in the AM. She has been eating a bland diet. Endorses a 5# weight loss. She denies having blood in her stool. She has not been around others with the same symptoms. No fever or chills. Recent travel to [facility]. No recent hospitalization. H Pylori  test negative.      No past medical history on file.  Past Surgical History:   Procedure Laterality Date     WISDOM TOOTH EXTRACTION       Social History     Tobacco Use     Smoking status: Never Smoker     Smokeless tobacco: Never Used   Substance Use Topics     Alcohol use: Yes     Alcohol/week: 1.0 standard drink     Types: 1 Standard drinks or equivalent per week     Comment: Alcoholic Drinks/day: Social      Current Outpatient Medications   Medication     Multiple Vitamin (MULTIVITAMIN PO)     No current facility-administered medications for this visit.     No Known Allergies    10 point ROS of systems were all negative except for pertinent positives noted in my HPI.      Exam:   /72 (BP Location: Right arm, Patient Position: Sitting, Cuff Size: Adult Regular)   Pulse 61   Temp 99.2  F (37.3  C) (Tympanic)   LMP  (LMP Unknown)   SpO2 100%   Constitutional: healthy, alert and no distress  Head: Normocephalic, atraumatic.  Eyes: conjunctiva clear, no drainage  ENT: nasal mucosa pink and moist, throat without tonsillar hypertrophy or erythema  Neck: neck is supple, no cervical lymphadenopathy or nuchal rigidity  Cardiovascular: RRR  Respiratory: CTA bilaterally, no rhonchi or rales  Gastrointestinal: soft and nontender. No rebound, guarding or rigidity.   Skin: no rashes  Neurologic: Speech clear, gait normal. Moves all extremities.    Results for orders placed or performed in visit on 07/15/22   UA macro with reflex to Microscopic and Culture - Clinc Collect     Status: Abnormal    Specimen: Urine, Clean Catch   Result Value Ref Range    Color Urine Yellow Colorless, Straw, Light Yellow, Yellow    Appearance Urine Clear Clear    Glucose Urine Negative Negative mg/dL    Bilirubin Urine Negative Negative    Ketones Urine 15  (A) Negative mg/dL    Specific Gravity Urine 1.015 1.003 - 1.035    Blood Urine Negative Negative    pH Urine 5.0 5.0 - 7.0    Protein Albumin Urine Negative Negative mg/dL     Urobilinogen Urine 0.2 0.2, 1.0 E.U./dL    Nitrite Urine Negative Negative    Leukocyte Esterase Urine Negative Negative    Narrative    Microscopic not indicated

## 2022-07-15 NOTE — PATIENT INSTRUCTIONS
BRAT diet   Small sips of fluids frequently  OK to try imodium for diarrhea for several days  If labs are all negative, and you continue to have diarrhea, follow-up with GI      Follow-up in ER if development of:  You get an oral temperature above 100.5F  You have blood in your stools. This may be bright red or appear as black, tarry stools.   You keep vomiting (throwing up) or cannot drink liquids.  You see blood when you vomit.   You cannot have a bowel movement or you cannot pass gas.  Your stomach gets bloated or bigger.  Your skin or the whites of your eyes look yellow.  You faint.  You have bloody, frequent or painful urination (peeing).  You have new symptoms or anything that worries you.

## 2022-07-16 PROCEDURE — 87506 IADNA-DNA/RNA PROBE TQ 6-11: CPT | Performed by: PHYSICIAN ASSISTANT

## 2022-07-16 PROCEDURE — 87328 CRYPTOSPORIDIUM AG IA: CPT | Performed by: PHYSICIAN ASSISTANT

## 2022-07-16 PROCEDURE — 87209 SMEAR COMPLEX STAIN: CPT | Performed by: PHYSICIAN ASSISTANT

## 2022-07-16 PROCEDURE — 87177 OVA AND PARASITES SMEARS: CPT | Performed by: PHYSICIAN ASSISTANT

## 2022-07-16 PROCEDURE — 87329 GIARDIA AG IA: CPT | Mod: 59 | Performed by: PHYSICIAN ASSISTANT

## 2022-07-17 LAB
C COLI+JEJUNI+LARI FUSA STL QL NAA+PROBE: NOT DETECTED
C DIFF TOX B STL QL: NEGATIVE
EC STX1 GENE STL QL NAA+PROBE: NOT DETECTED
EC STX2 GENE STL QL NAA+PROBE: NOT DETECTED
NOROV GI+II ORF1-ORF2 JNC STL QL NAA+PR: NOT DETECTED
RVA NSP5 STL QL NAA+PROBE: NOT DETECTED
SALMONELLA SP RPOD STL QL NAA+PROBE: NOT DETECTED
SHIGELLA SP+EIEC IPAH STL QL NAA+PROBE: NOT DETECTED
V CHOL+PARA RFBL+TRKH+TNAA STL QL NAA+PR: NOT DETECTED
Y ENTERO RECN STL QL NAA+PROBE: NOT DETECTED

## 2022-07-17 PROCEDURE — 87493 C DIFF AMPLIFIED PROBE: CPT | Performed by: PHYSICIAN ASSISTANT

## 2022-07-18 LAB
C PARVUM AG STL QL IA: NEGATIVE
G LAMBLIA AG STL QL IA: NEGATIVE
O+P STL MICRO: NEGATIVE

## 2022-07-20 ENCOUNTER — TRANSFERRED RECORDS (OUTPATIENT)
Dept: HEALTH INFORMATION MANAGEMENT | Facility: CLINIC | Age: 27
End: 2022-07-20

## 2022-07-20 ENCOUNTER — APPOINTMENT (OUTPATIENT)
Dept: GENERAL RADIOLOGY | Facility: CLINIC | Age: 27
End: 2022-07-20
Attending: EMERGENCY MEDICINE
Payer: COMMERCIAL

## 2022-07-20 ENCOUNTER — HOSPITAL ENCOUNTER (EMERGENCY)
Facility: CLINIC | Age: 27
Discharge: HOME | End: 2022-07-21
Attending: EMERGENCY MEDICINE | Admitting: EMERGENCY MEDICINE
Payer: COMMERCIAL

## 2022-07-20 DIAGNOSIS — R00.2 PALPITATIONS: ICD-10-CM

## 2022-07-20 DIAGNOSIS — R10.13 EPIGASTRIC PAIN: ICD-10-CM

## 2022-07-20 DIAGNOSIS — K21.00 GASTROESOPHAGEAL REFLUX DISEASE WITH ESOPHAGITIS WITHOUT HEMORRHAGE: ICD-10-CM

## 2022-07-20 LAB
ALBUMIN SERPL-MCNC: 4.1 G/DL (ref 3.4–5)
ALP SERPL-CCNC: 42 U/L (ref 40–150)
ALT SERPL W P-5'-P-CCNC: 16 U/L (ref 0–50)
ANION GAP SERPL CALCULATED.3IONS-SCNC: 4 MMOL/L (ref 3–14)
AST SERPL W P-5'-P-CCNC: 9 U/L (ref 0–45)
BASOPHILS # BLD AUTO: 0 10E3/UL (ref 0–0.2)
BASOPHILS NFR BLD AUTO: 0 %
BILIRUB DIRECT SERPL-MCNC: 0.1 MG/DL (ref 0–0.2)
BILIRUB SERPL-MCNC: 0.3 MG/DL (ref 0.2–1.3)
BUN SERPL-MCNC: 13 MG/DL (ref 7–30)
CALCIUM SERPL-MCNC: 9 MG/DL (ref 8.5–10.1)
CHLORIDE BLD-SCNC: 110 MMOL/L (ref 94–109)
CO2 SERPL-SCNC: 25 MMOL/L (ref 20–32)
CREAT SERPL-MCNC: 0.8 MG/DL (ref 0.52–1.04)
EOSINOPHIL # BLD AUTO: 0 10E3/UL (ref 0–0.7)
EOSINOPHIL NFR BLD AUTO: 0 %
ERYTHROCYTE [DISTWIDTH] IN BLOOD BY AUTOMATED COUNT: 11.8 % (ref 10–15)
GFR SERPL CREATININE-BSD FRML MDRD: >90 ML/MIN/1.73M2
GLUCOSE BLD-MCNC: 93 MG/DL (ref 70–99)
HCT VFR BLD AUTO: 42.4 % (ref 35–47)
HGB BLD-MCNC: 13.7 G/DL (ref 11.7–15.7)
IMM GRANULOCYTES # BLD: 0 10E3/UL
IMM GRANULOCYTES NFR BLD: 0 %
LIPASE SERPL-CCNC: 166 U/L (ref 73–393)
LYMPHOCYTES # BLD AUTO: 1.3 10E3/UL (ref 0.8–5.3)
LYMPHOCYTES NFR BLD AUTO: 18 %
MCH RBC QN AUTO: 32.4 PG (ref 26.5–33)
MCHC RBC AUTO-ENTMCNC: 32.3 G/DL (ref 31.5–36.5)
MCV RBC AUTO: 100 FL (ref 78–100)
MONOCYTES # BLD AUTO: 0.5 10E3/UL (ref 0–1.3)
MONOCYTES NFR BLD AUTO: 7 %
NEUTROPHILS # BLD AUTO: 5.3 10E3/UL (ref 1.6–8.3)
NEUTROPHILS NFR BLD AUTO: 75 %
NRBC # BLD AUTO: 0 10E3/UL
NRBC BLD AUTO-RTO: 0 /100
PLATELET # BLD AUTO: 223 10E3/UL (ref 150–450)
POTASSIUM BLD-SCNC: 3.9 MMOL/L (ref 3.4–5.3)
PROT SERPL-MCNC: 7.2 G/DL (ref 6.8–8.8)
RBC # BLD AUTO: 4.23 10E6/UL (ref 3.8–5.2)
SODIUM SERPL-SCNC: 139 MMOL/L (ref 133–144)
TROPONIN I SERPL HS-MCNC: 7 NG/L
WBC # BLD AUTO: 7.2 10E3/UL (ref 4–11)

## 2022-07-20 PROCEDURE — 84484 ASSAY OF TROPONIN QUANT: CPT | Performed by: EMERGENCY MEDICINE

## 2022-07-20 PROCEDURE — 76705 ECHO EXAM OF ABDOMEN: CPT

## 2022-07-20 PROCEDURE — 85025 COMPLETE CBC W/AUTO DIFF WBC: CPT | Performed by: EMERGENCY MEDICINE

## 2022-07-20 PROCEDURE — 84703 CHORIONIC GONADOTROPIN ASSAY: CPT | Performed by: EMERGENCY MEDICINE

## 2022-07-20 PROCEDURE — 82248 BILIRUBIN DIRECT: CPT | Performed by: EMERGENCY MEDICINE

## 2022-07-20 PROCEDURE — 93005 ELECTROCARDIOGRAM TRACING: CPT

## 2022-07-20 PROCEDURE — 83690 ASSAY OF LIPASE: CPT | Performed by: EMERGENCY MEDICINE

## 2022-07-20 PROCEDURE — 36415 COLL VENOUS BLD VENIPUNCTURE: CPT | Performed by: EMERGENCY MEDICINE

## 2022-07-20 PROCEDURE — 99285 EMERGENCY DEPT VISIT HI MDM: CPT | Mod: 25

## 2022-07-20 PROCEDURE — 80053 COMPREHEN METABOLIC PANEL: CPT | Performed by: EMERGENCY MEDICINE

## 2022-07-20 PROCEDURE — 71046 X-RAY EXAM CHEST 2 VIEWS: CPT

## 2022-07-21 VITALS
HEIGHT: 65 IN | SYSTOLIC BLOOD PRESSURE: 139 MMHG | HEART RATE: 86 BPM | BODY MASS INDEX: 21.66 KG/M2 | OXYGEN SATURATION: 99 % | WEIGHT: 130 LBS | RESPIRATION RATE: 18 BRPM | TEMPERATURE: 98.5 F | DIASTOLIC BLOOD PRESSURE: 85 MMHG

## 2022-07-21 LAB
ATRIAL RATE - MUSE: 72 BPM
DIASTOLIC BLOOD PRESSURE - MUSE: NORMAL MMHG
HCG SERPL QL: NEGATIVE
HOLD SPECIMEN: NORMAL
INTERPRETATION ECG - MUSE: NORMAL
P AXIS - MUSE: 70 DEGREES
PR INTERVAL - MUSE: 140 MS
QRS DURATION - MUSE: 80 MS
QT - MUSE: 376 MS
QTC - MUSE: 411 MS
R AXIS - MUSE: 78 DEGREES
SYSTOLIC BLOOD PRESSURE - MUSE: NORMAL MMHG
T AXIS - MUSE: 65 DEGREES
VENTRICULAR RATE- MUSE: 72 BPM

## 2022-07-21 RX ORDER — FAMOTIDINE 20 MG/1
20 TABLET, FILM COATED ORAL 2 TIMES DAILY
Qty: 5 TABLET | Refills: 0 | Status: SHIPPED | OUTPATIENT
Start: 2022-07-21 | End: 2022-07-29

## 2022-07-21 ASSESSMENT — ENCOUNTER SYMPTOMS
PALPITATIONS: 1
LIGHT-HEADEDNESS: 1
CHEST TIGHTNESS: 1
MYALGIAS: 1
FATIGUE: 1
ABDOMINAL PAIN: 0

## 2022-07-21 NOTE — ED PROVIDER NOTES
History   Chief Complaint:  Palpitations       HPI   Alicia Reyes is a 27 year old female with history of panic attack who presents with palpitations. The patient reports that around three weeks ago she had a near syncopal event [place]. She claims that since then she has felt extremely weak and fatigued with nausea in the morning after eating. Tonight, she reports heart palpitations and she used Cardia that told her she could have potential A fib which made her anxious, prompting her trip to the ED. Notes that she has a few palpitations per day for the past few weeks and also frequent chest pressure. She complains that she has been belching and having bowel movements more frequently than normal for the past three weeks and has been passing yellow, 'mucusy' stools intermittently as well. Partner reports she has been under increased stress lately. Reports following a bland diet recently.     Of note, she mentioned that her biological father  of a heart attack in his 50s. Denies history of thyroid problems or risk of pregnancy. Reports history of syncopal event a few years ago and was placed on a holter monitor with cardiologist stress and ECHO test which were negative.     Review of Systems   Constitutional: Positive for fatigue.   Respiratory: Positive for chest tightness.    Cardiovascular: Positive for palpitations.   Gastrointestinal: Negative for abdominal pain.   Musculoskeletal: Positive for myalgias (none currently).   Neurological: Positive for light-headedness.   All other systems reviewed and are negative.        Allergies:  The patient has no known allergies.     Medications:  Propanolol  Omeprazole  Lorazepam    Past Medical History:     Panic attack     Past Surgical History:    [place] tooth extraction    Family History:    Lung disease  Osteoporosis  Heart disease  GI problems  Severe Allergy  Lung Cancer   Cervical cancer    Social History:  The patient presents to the ED  "with   PCP: Wade    Physical Exam     Patient Vitals for the past 24 hrs:   BP Temp Temp src Pulse Resp SpO2 Height Weight   07/20/22 2154 (!) 151/92 98.5  F (36.9  C) Temporal 85 20 99 % 1.651 m (5' 5\") 59 kg (130 lb)       Physical Exam  Gen: well appearing, in no acute distress  HENT:  mmm, no rhinorrhea  Eyes: periorbital tissues and sclera normal   Neck: supple, no abnormal swelling  Lungs:  CTAB,  no resp distress  CV: rrr, no m/r/g, ppi  Abd: soft, mild epigastric ttp , nondistended, no rebound/masses/guarding/hsm  Ext: no peripheral edema  Skin: warm, dry, well perfused, no rashes/bruising/lesions on exposed skin  Neuro: alert, MAEE, no gross motor or sensory deficits, gait stable  Psych: Normal mood, normal affect      Emergency Department Course     Imaging:  XR Chest 2 Views   Final Result   IMPRESSION: Negative chest.      POC US ABDOMEN LIMITED   Final Result   PROCEDURE NOTE       Emergency Bedside Ultrasound - Right Upper Quadrant      Preformed by: Deng Wade MD      Indication - RUQ/Epigastric abdominal pain        Suspicion of cholelithiasis/cholecystitis/CBD ston      Probe: low frequency curvilinear probe      Windows - longitudinal and transverse views       Findings - no Cholelithiasis, no pericholestatic fluid, <4mm wall thickness, no sonographic Rubin's sign      Impression - no e/o cholelithiasis, R hydronephrosis.        Images savedto PACS per protocol           Report per radiology    Laboratory:  Labs Ordered and Resulted from Time of ED Arrival to Time of ED Departure   BASIC METABOLIC PANEL - Abnormal       Result Value    Sodium 139      Potassium 3.9      Chloride 110 (*)     Carbon Dioxide (CO2) 25      Anion Gap 4      Urea Nitrogen 13      Creatinine 0.80      Calcium 9.0      Glucose 93      GFR Estimate >90     TROPONIN I - Normal    Troponin I High Sensitivity 7     HCG QUALITATIVE PREGNANCY - Normal    hCG Serum Qualitative Negative     HEPATIC FUNCTION PANEL - " Normal    Bilirubin Total 0.3      Bilirubin Direct 0.1      Protein Total 7.2      Albumin 4.1      Alkaline Phosphatase 42      AST 9      ALT 16     LIPASE - Normal    Lipase 166     CBC WITH PLATELETS AND DIFFERENTIAL    WBC Count 7.2      RBC Count 4.23      Hemoglobin 13.7      Hematocrit 42.4            MCH 32.4      MCHC 32.3      RDW 11.8      Platelet Count 223      % Neutrophils 75      % Lymphocytes 18      % Monocytes 7      % Eosinophils 0      % Basophils 0      % Immature Granulocytes 0      NRBCs per 100 WBC 0      Absolute Neutrophils 5.3      Absolute Lymphocytes 1.3      Absolute Monocytes 0.5      Absolute Eosinophils 0.0      Absolute Basophils 0.0      Absolute Immature Granulocytes 0.0      Absolute NRBCs 0.0          Emergency Department Course:      Reviewed:  I reviewed nursing notes, vitals, past medical history and Care Everywhere    Assessments:  2312 I obtained history and examined the patient as noted above.   0037 I rechecked the patient and explained findings.     Disposition:  The patient was discharged to home.     Impression & Plan   Medical Decision Makin-year-old female here with multiple complaints for screening palpitations.  Reviewed the Procardia and on her phone and see sinus rhythm with occasional PVCs and PACs and a wavy baseline.  I do not see anything classic for atrial fibrillation.  ECG here in the emergency room is normal sinus rhythm without signs of malignant arrhythmia or ischemia.  Basic blood test troponin are unremarkable.  No clinical concern for dissection or PE.  Can follow this clinically if burden of palpitations increases consider outpatient heart monitoring.  Related to the epigastric/chest pain.  No concerning cardiopulmonary etiology is identified.  Likely this is gastritis or peptic ulcer disease.  Bedside ultrasound showing no cholelithiasis or hydronephrosis or other abnormality.  Hepatic panel lipase are normal.  She has reached out  to GI and they will set up an endoscopy.  We will start her on antacid therapy for the time being.  She and her significant other were comfortable and agreeable to plan.  They will return with any new or worsening symptoms.    Diagnosis:    ICD-10-CM    1. Palpitations  R00.2    2. Epigastric pain  R10.13    3. Gastroesophageal reflux disease with esophagitis without hemorrhage  K21.00        Discharge Medications:  New Prescriptions    FAMOTIDINE (PEPCID) 20 MG TABLET    Take 1 tablet (20 mg) by mouth 2 times daily    OMEPRAZOLE (PRILOSEC) 20 MG DR CAPSULE    Take 1 capsule (20 mg) by mouth 2 times daily for 14 days       Scribe Disclosure:  James VALENTIN and Cami Castano, am serving as a scribe at 11:12 PM on 7/20/2022 to document services personally performed by Deng Wade MD based on my observations and the provider's statements to me.            Deng Wade MD  07/21/22 0055

## 2022-07-21 NOTE — ED TRIAGE NOTES
Here for concern of palpitations started about 45 minutes ago associated with mild midsternal chest pain, nausea, dizziness and weakness. Stated ekg otilio shows A-fib. Stated similar chest pain when working out, saw cardiology 2 years but everything was normal. ABCs intact.      Triage Assessment     Row Name 07/20/22 9408       Triage Assessment (Adult)    Airway WDL WDL       Respiratory WDL    Respiratory WDL WDL       Cardiac WDL    Cardiac WDL WDL

## 2022-07-22 ENCOUNTER — TRANSFERRED RECORDS (OUTPATIENT)
Dept: HEALTH INFORMATION MANAGEMENT | Facility: CLINIC | Age: 27
End: 2022-07-22

## 2022-07-22 ENCOUNTER — TRANSFERRED RECORDS (OUTPATIENT)
Dept: INTERNAL MEDICINE | Facility: CLINIC | Age: 27
End: 2022-07-22

## 2022-07-25 ENCOUNTER — MYC MEDICAL ADVICE (OUTPATIENT)
Dept: INTERNAL MEDICINE | Facility: CLINIC | Age: 27
End: 2022-07-25

## 2022-07-25 ENCOUNTER — TRANSFERRED RECORDS (OUTPATIENT)
Dept: HEALTH INFORMATION MANAGEMENT | Facility: CLINIC | Age: 27
End: 2022-07-25

## 2022-07-28 NOTE — PROGRESS NOTES
Assessment & Plan   Problem List Items Addressed This Visit    None     Visit Diagnoses     Duodenitis    -  Primary    Palpitations        Relevant Orders    Adult Cardiac Event Monitor    Anxiety        Nausea        Fatigue, unspecified type        Appetite loss           Epigastric to upper abd discomfort after eating radiating to the back..  Patient ultrasound abdominal earlier this year which was negative.  Upper scoping showed duodenitis with recommendation to take her omeprazole as prescribed in addition to avoiding offending foods limiting alcohol consumption no NSAID use she has an upcoming appointment with GI next week.  She also be scheduled for colonoscopy.  Recent history of palpitations seen in the ED EKG showed PVCs will obtain cardiac event monitoring.    The patient's anxiety would recommend starting an SSRI she would like to do [facility] testing we will check with her insurance she will contact my office once this has been completed and we will schedule testing.    Certainly could consider food intolerances will await celiac panel from GI could consider referral to allergy for additional food testing.  Continue small frequent meals advance diet as tolerated she does have Zofran if needed for nausea recommend increasing physical activity as tolerated has some deconditioning for the last month of on feeling unwell         I spent a total of 34 minutes on the day of the visit.   Time spent doing chart review, history and exam, documentation and further activities per the note           No follow-ups on file.    Annabelle Olvera NP  [facility]              Deya Vargas is a 27 year old accompanied by her spouse, presenting for the following health issues:  Follow Up (Pt states a lot of the symptoms are the same, getting worse a little bit) and Patient/info Update (Endoscopy last friday)      History of Present Illness       Reason for visit:  Persistent nausea, fatigue,  "loss of appetite, weakness, indigestion, anxiety    She eats 2-3 servings of fruits and vegetables daily.She consumes 0 sweetened beverage(s) daily.She exercises with enough effort to increase her heart rate 9 or less minutes per day.  She exercises with enough effort to increase her heart rate 3 or less days per week.   She is taking medications regularly.     Wt Readings from Last 5 Encounters:   07/29/22 57.5 kg (126 lb 12.8 oz)   07/20/22 59 kg (130 lb)   04/13/22 61.8 kg (136 lb 3.2 oz)   11/12/21 61.2 kg (135 lb)   03/04/21 64.9 kg (143 lb)             Review of Systems   Unremarkable other than listed above and below         Objective    /70 (BP Location: Right arm, Patient Position: Sitting, Cuff Size: Adult Regular)   Pulse 62   Temp 98.6  F (37  C) (Oral)   Resp 18   Ht 1.651 m (5' 5\")   Wt 57.5 kg (126 lb 12.8 oz)   LMP 07/19/2022 (Approximate)   SpO2 100%   BMI 21.10 kg/m    Body mass index is 21.1 kg/m .  Physical Exam   GENERAL: healthy, alert and no distress  EYES: Eyes grossly normal to inspection,  conjunctivae and sclerae normal  RESP: Respirations are regular nonlabored  PSYCH: mentation appears normal, affect normal/bright    Admission on 07/20/2022, Discharged on 07/21/2022   Component Date Value Ref Range Status     Ventricular Rate 07/20/2022 72  BPM Incomplete     Atrial Rate 07/20/2022 72  BPM Incomplete     [place] Interval 07/20/2022 140  ms Incomplete     QRS Duration 07/20/2022 80  ms Incomplete     QT 07/20/2022 376  ms Incomplete     QTc 07/20/2022 411  ms Incomplete     P Axis 07/20/2022 70  degrees Incomplete     R AXIS 07/20/2022 78  degrees Incomplete     T Axis 07/20/2022 65  degrees Incomplete     Interpretation ECG 07/20/2022    Incomplete                    Value:Sinus rhythm  Possible Left atrial enlargement  Borderline ECG  When compared with ECG of 04-JUN-2020 13:39,  Nonspecific T wave abnormality no longer evident in Anterior leads       Sodium 07/20/2022 139  133 " - 144 mmol/L Final     Potassium 07/20/2022 3.9  3.4 - 5.3 mmol/L Final     Chloride 07/20/2022 110 (A) 94 - 109 mmol/L Final     Carbon Dioxide (CO2) 07/20/2022 25  20 - 32 mmol/L Final     Anion Gap 07/20/2022 4  3 - 14 mmol/L Final     Urea Nitrogen 07/20/2022 13  7 - 30 mg/dL Final     Creatinine 07/20/2022 0.80  0.52 - 1.04 mg/dL Final     Calcium 07/20/2022 9.0  8.5 - 10.1 mg/dL Final     Glucose 07/20/2022 93  70 - 99 mg/dL Final     GFR Estimate 07/20/2022 >90  >60 mL/min/1.73m2 Final    Effective December 21, 2021 eGFRcr in adults is calculated using the 2021 CKD-EPI creatinine equation which includes age and gender (Herlinda et al., [facility], DOI: 10.1056/VCHTag1436502)     Troponin I High Sensitivity 07/20/2022 7  <54 ng/L Final    This Troponin-I result was obtained using a Siemens Dimension Vista High Sensitivity Troponin-I assay (TNIH). Effective 11/23/21, nine labs/sites in the [facility] [place] [facility] switched from a Siemens [place] Contemporary Troponin I assay (CTNI) to a Siemens [place] High-Sensitivity Troponin I assay (TNIH).     hCG Serum Qualitative 07/20/2022 Negative  Negative Final    This test is for screening purposes.  Results should be interpreted along with the clinical picture.  Confirmation testing is available if warranted by ordering [id number], HCG Quantitative Pregnancy.     WBC Count 07/20/2022 7.2  4.0 - 11.0 10e3/uL Final     RBC Count 07/20/2022 4.23  3.80 - 5.20 10e6/uL Final     Hemoglobin 07/20/2022 13.7  11.7 - 15.7 g/dL Final     Hematocrit 07/20/2022 42.4  35.0 - 47.0 % Final     MCV 07/20/2022 100  78 - 100 fL Final     MCH 07/20/2022 32.4  26.5 - 33.0 pg Final     MCHC 07/20/2022 32.3  31.5 - 36.5 g/dL Final     RDW 07/20/2022 11.8  10.0 - 15.0 % Final     Platelet Count 07/20/2022 223  150 - 450 10e3/uL Final     % Neutrophils 07/20/2022 75  % Final     % Lymphocytes 07/20/2022 18  % Final     % Monocytes 07/20/2022 7  % Final     % Eosinophils 07/20/2022 0  % Final     % Basophils  07/20/2022 0  % Final     % Immature Granulocytes 07/20/2022 0  % Final     NRBCs per 100 WBC 07/20/2022 0  <1 /100 Final     Absolute Neutrophils 07/20/2022 5.3  1.6 - 8.3 10e3/uL Final     Absolute Lymphocytes 07/20/2022 1.3  0.8 - 5.3 10e3/uL Final     Absolute Monocytes 07/20/2022 0.5  0.0 - 1.3 10e3/uL Final     Absolute Eosinophils 07/20/2022 0.0  0.0 - 0.7 10e3/uL Final     Absolute Basophils 07/20/2022 0.0  0.0 - 0.2 10e3/uL Final     Absolute Immature Granulocytes 07/20/2022 0.0  <=0.4 10e3/uL Final     Absolute NRBCs 07/20/2022 0.0  10e3/uL Final     Hold Specimen 07/20/2022 [facility]   Final     Hold Specimen 07/20/2022 [facility]   Final     Hold Specimen 07/20/2022 [facility]   Final     Hold Specimen 07/20/2022 [facility]   Final     Bilirubin Total 07/20/2022 0.3  0.2 - 1.3 mg/dL Final     Bilirubin Direct 07/20/2022 0.1  0.0 - 0.2 mg/dL Final     Protein Total 07/20/2022 7.2  6.8 - 8.8 g/dL Final     Albumin 07/20/2022 4.1  3.4 - 5.0 g/dL Final     Alkaline Phosphatase 07/20/2022 42  40 - 150 U/L Final     AST 07/20/2022 9  0 - 45 U/L Final     ALT 07/20/2022 16  0 - 50 U/L Final     Lipase 07/20/2022 166  73 - 393 U/L Final                   .  ..

## 2022-07-29 ENCOUNTER — OFFICE VISIT (OUTPATIENT)
Dept: INTERNAL MEDICINE | Facility: CLINIC | Age: 27
End: 2022-07-29
Payer: COMMERCIAL

## 2022-07-29 VITALS
BODY MASS INDEX: 21.13 KG/M2 | HEART RATE: 62 BPM | TEMPERATURE: 98.6 F | RESPIRATION RATE: 18 BRPM | SYSTOLIC BLOOD PRESSURE: 110 MMHG | WEIGHT: 126.8 LBS | DIASTOLIC BLOOD PRESSURE: 70 MMHG | HEIGHT: 65 IN | OXYGEN SATURATION: 100 %

## 2022-07-29 DIAGNOSIS — R11.0 NAUSEA: ICD-10-CM

## 2022-07-29 DIAGNOSIS — K29.80 DUODENITIS: Primary | ICD-10-CM

## 2022-07-29 DIAGNOSIS — R00.2 PALPITATIONS: ICD-10-CM

## 2022-07-29 DIAGNOSIS — R63.0 APPETITE LOSS: ICD-10-CM

## 2022-07-29 DIAGNOSIS — F41.9 ANXIETY: ICD-10-CM

## 2022-07-29 DIAGNOSIS — R53.83 FATIGUE, UNSPECIFIED TYPE: ICD-10-CM

## 2022-07-29 PROCEDURE — 99214 OFFICE O/P EST MOD 30 MIN: CPT | Performed by: NURSE PRACTITIONER

## 2022-07-29 RX ORDER — ONDANSETRON 4 MG/1
TABLET, FILM COATED ORAL
COMMUNITY
Start: 2022-07-01 | End: 2022-09-30

## 2022-07-29 ASSESSMENT — PAIN SCALES - GENERAL: PAINLEVEL: NO PAIN (0)

## 2022-08-02 ENCOUNTER — TRANSFERRED RECORDS (OUTPATIENT)
Dept: HEALTH INFORMATION MANAGEMENT | Facility: CLINIC | Age: 27
End: 2022-08-02

## 2022-08-02 ENCOUNTER — HOSPITAL ENCOUNTER (EMERGENCY)
Facility: CLINIC | Age: 27
Discharge: HOME | End: 2022-08-03
Attending: EMERGENCY MEDICINE | Admitting: EMERGENCY MEDICINE
Payer: COMMERCIAL

## 2022-08-02 DIAGNOSIS — K52.9 COLITIS: ICD-10-CM

## 2022-08-02 DIAGNOSIS — R10.84 ABDOMINAL PAIN, GENERALIZED: ICD-10-CM

## 2022-08-02 DIAGNOSIS — Z98.890 STATUS POST COLONOSCOPY WITH POLYPECTOMY: ICD-10-CM

## 2022-08-02 LAB
ERYTHROCYTE [DISTWIDTH] IN BLOOD BY AUTOMATED COUNT: 11.5 % (ref 10–15)
HCG SER QL IA.RAPID: NEGATIVE
HCT VFR BLD AUTO: 44.4 % (ref 35–47)
HGB BLD-MCNC: 14.9 G/DL (ref 11.7–15.7)
HOLD SPECIMEN: NORMAL
HOLD SPECIMEN: NORMAL
MCH RBC QN AUTO: 33.6 PG (ref 26.5–33)
MCHC RBC AUTO-ENTMCNC: 33.6 G/DL (ref 31.5–36.5)
MCV RBC AUTO: 100 FL (ref 78–100)
PLATELET # BLD AUTO: 199 10E3/UL (ref 150–450)
RBC # BLD AUTO: 4.44 10E6/UL (ref 3.8–5.2)
WBC # BLD AUTO: 20.8 10E3/UL (ref 4–11)

## 2022-08-02 PROCEDURE — 85027 COMPLETE CBC AUTOMATED: CPT | Performed by: EMERGENCY MEDICINE

## 2022-08-02 PROCEDURE — 99285 EMERGENCY DEPT VISIT HI MDM: CPT | Mod: 25

## 2022-08-02 PROCEDURE — 84702 CHORIONIC GONADOTROPIN TEST: CPT

## 2022-08-02 PROCEDURE — 36415 COLL VENOUS BLD VENIPUNCTURE: CPT | Performed by: EMERGENCY MEDICINE

## 2022-08-02 PROCEDURE — 96375 TX/PRO/DX INJ NEW DRUG ADDON: CPT

## 2022-08-02 PROCEDURE — 80053 COMPREHEN METABOLIC PANEL: CPT | Performed by: EMERGENCY MEDICINE

## 2022-08-02 PROCEDURE — 250N000011 HC RX IP 250 OP 636: Performed by: EMERGENCY MEDICINE

## 2022-08-02 PROCEDURE — 258N000003 HC RX IP 258 OP 636: Performed by: EMERGENCY MEDICINE

## 2022-08-02 PROCEDURE — 96361 HYDRATE IV INFUSION ADD-ON: CPT

## 2022-08-02 PROCEDURE — 96374 THER/PROPH/DIAG INJ IV PUSH: CPT | Mod: 59

## 2022-08-02 RX ORDER — HYDROMORPHONE HYDROCHLORIDE 1 MG/ML
0.5 INJECTION, SOLUTION INTRAMUSCULAR; INTRAVENOUS; SUBCUTANEOUS
Status: COMPLETED | OUTPATIENT
Start: 2022-08-02 | End: 2022-08-02

## 2022-08-02 RX ORDER — ONDANSETRON 2 MG/ML
4 INJECTION INTRAMUSCULAR; INTRAVENOUS ONCE
Status: COMPLETED | OUTPATIENT
Start: 2022-08-02 | End: 2022-08-02

## 2022-08-02 RX ORDER — HYDROMORPHONE HYDROCHLORIDE 1 MG/ML
0.5 INJECTION, SOLUTION INTRAMUSCULAR; INTRAVENOUS; SUBCUTANEOUS ONCE
Status: COMPLETED | OUTPATIENT
Start: 2022-08-02 | End: 2022-08-03

## 2022-08-02 RX ADMIN — HYDROMORPHONE HYDROCHLORIDE 0.5 MG: 1 INJECTION, SOLUTION INTRAMUSCULAR; INTRAVENOUS; SUBCUTANEOUS at 22:47

## 2022-08-02 RX ADMIN — ONDANSETRON 4 MG: 2 INJECTION INTRAMUSCULAR; INTRAVENOUS at 22:29

## 2022-08-02 RX ADMIN — SODIUM CHLORIDE 1000 ML: 9 INJECTION, SOLUTION INTRAVENOUS at 22:30

## 2022-08-02 ASSESSMENT — ENCOUNTER SYMPTOMS
ABDOMINAL PAIN: 1
BACK PAIN: 1
BLOOD IN STOOL: 1
DYSURIA: 0

## 2022-08-03 ENCOUNTER — APPOINTMENT (OUTPATIENT)
Dept: CT IMAGING | Facility: CLINIC | Age: 27
End: 2022-08-03
Attending: EMERGENCY MEDICINE
Payer: COMMERCIAL

## 2022-08-03 ENCOUNTER — TRANSFERRED RECORDS (OUTPATIENT)
Dept: HEALTH INFORMATION MANAGEMENT | Facility: CLINIC | Age: 27
End: 2022-08-03

## 2022-08-03 ENCOUNTER — OFFICE VISIT (OUTPATIENT)
Dept: URGENT CARE | Facility: URGENT CARE | Age: 27
End: 2022-08-03
Payer: COMMERCIAL

## 2022-08-03 VITALS
TEMPERATURE: 97.8 F | SYSTOLIC BLOOD PRESSURE: 105 MMHG | HEART RATE: 77 BPM | OXYGEN SATURATION: 94 % | DIASTOLIC BLOOD PRESSURE: 67 MMHG | RESPIRATION RATE: 18 BRPM

## 2022-08-03 VITALS
SYSTOLIC BLOOD PRESSURE: 122 MMHG | HEART RATE: 75 BPM | RESPIRATION RATE: 14 BRPM | TEMPERATURE: 97.6 F | DIASTOLIC BLOOD PRESSURE: 72 MMHG | OXYGEN SATURATION: 100 %

## 2022-08-03 DIAGNOSIS — R10.9 FLANK PAIN: Primary | ICD-10-CM

## 2022-08-03 LAB
ALBUMIN SERPL BCG-MCNC: 4.8 G/DL (ref 3.5–5.2)
ALBUMIN UR-MCNC: NEGATIVE MG/DL
ALP SERPL-CCNC: 53 U/L (ref 35–104)
ALT SERPL W P-5'-P-CCNC: 18 U/L (ref 10–35)
ANION GAP SERPL CALCULATED.3IONS-SCNC: 9 MMOL/L (ref 7–15)
APPEARANCE UR: CLEAR
AST SERPL W P-5'-P-CCNC: 23 U/L (ref 10–35)
BILIRUB SERPL-MCNC: 0.4 MG/DL
BILIRUB UR QL STRIP: NEGATIVE
BUN SERPL-MCNC: 8.1 MG/DL (ref 6–20)
CALCIUM SERPL-MCNC: 9.5 MG/DL (ref 8.6–10)
CHLORIDE SERPL-SCNC: 100 MMOL/L (ref 98–107)
COLOR UR AUTO: YELLOW
CREAT SERPL-MCNC: 0.76 MG/DL (ref 0.51–0.95)
DEPRECATED HCO3 PLAS-SCNC: 25 MMOL/L (ref 22–29)
GFR SERPL CREATININE-BSD FRML MDRD: >90 ML/MIN/1.73M2
GLUCOSE SERPL-MCNC: 107 MG/DL (ref 70–99)
GLUCOSE UR STRIP-MCNC: NEGATIVE MG/DL
HGB UR QL STRIP: NEGATIVE
KETONES UR STRIP-MCNC: 80 MG/DL
LEUKOCYTE ESTERASE UR QL STRIP: NEGATIVE
NITRATE UR QL: NEGATIVE
PH UR STRIP: 5.5 [PH] (ref 5–7)
POTASSIUM SERPL-SCNC: 3.7 MMOL/L (ref 3.4–5.3)
PROT SERPL-MCNC: 7.3 G/DL (ref 6.4–8.3)
SODIUM SERPL-SCNC: 134 MMOL/L (ref 136–145)
SP GR UR STRIP: 1.02 (ref 1–1.03)
UROBILINOGEN UR STRIP-ACNC: 0.2 E.U./DL

## 2022-08-03 PROCEDURE — 96376 TX/PRO/DX INJ SAME DRUG ADON: CPT

## 2022-08-03 PROCEDURE — 250N000011 HC RX IP 250 OP 636: Performed by: EMERGENCY MEDICINE

## 2022-08-03 PROCEDURE — 74177 CT ABD & PELVIS W/CONTRAST: CPT

## 2022-08-03 PROCEDURE — 81003 URINALYSIS AUTO W/O SCOPE: CPT

## 2022-08-03 PROCEDURE — 99213 OFFICE O/P EST LOW 20 MIN: CPT | Performed by: PHYSICIAN ASSISTANT

## 2022-08-03 RX ORDER — HYDROMORPHONE HYDROCHLORIDE 1 MG/ML
0.5 INJECTION, SOLUTION INTRAMUSCULAR; INTRAVENOUS; SUBCUTANEOUS ONCE
Status: DISCONTINUED | OUTPATIENT
Start: 2022-08-03 | End: 2022-08-03 | Stop reason: HOSPADM

## 2022-08-03 RX ORDER — HYDROCODONE BITARTRATE AND ACETAMINOPHEN 5; 325 MG/1; MG/1
1 TABLET ORAL EVERY 6 HOURS PRN
Qty: 10 TABLET | Refills: 0 | Status: SHIPPED | OUTPATIENT
Start: 2022-08-03 | End: 2022-09-30

## 2022-08-03 RX ORDER — IOPAMIDOL 755 MG/ML
500 INJECTION, SOLUTION INTRAVASCULAR ONCE
Status: COMPLETED | OUTPATIENT
Start: 2022-08-03 | End: 2022-08-03

## 2022-08-03 RX ORDER — DICYCLOMINE HCL 20 MG
20 TABLET ORAL EVERY 6 HOURS
COMMUNITY
End: 2022-09-30

## 2022-08-03 RX ADMIN — HYDROMORPHONE HYDROCHLORIDE 0.5 MG: 1 INJECTION, SOLUTION INTRAMUSCULAR; INTRAVENOUS; SUBCUTANEOUS at 01:58

## 2022-08-03 RX ADMIN — IOPAMIDOL 58 ML: 755 INJECTION, SOLUTION INTRAVENOUS at 00:08

## 2022-08-03 ASSESSMENT — PAIN SCALES - GENERAL: PAINLEVEL: EXTREME PAIN (8)

## 2022-08-03 NOTE — ED PROVIDER NOTES
"  History   Chief Complaint:  Abdominal Pain       The history is provided by the patient.      Alicia Reyes is a 27 year old female with history of panic attacks who presents with abdominal pain after a colonoscopy this afternoon. Her procedure was done at [facility] in [place], [place] and they removed 3 polyps and did a biopsy of an area of inflammation. She felt slight abdominal pain and was able to pass gas but this did not relieve her pain. At around 1945 tonight, her pain was \"bad\" and continued to worsen. She stated that it started out feeling like menstrual cramps but the pain radiated to her upper abdomen and back as it worsened. It was originally intermittent but her pain has become constant. As she presents to the ED, she states that it hurts to breathe but the pain is not her lungs. She had a little bit of blood in her stool but not a concerning amount and feels it was anticipated. She denies that she is experiencing dysuria. She has not taken anything for the pain and has not received her prescription. She has been taking omeprazole for her loss of appetite, nausea, and yellow diarrhea that she has been experiencing for over a week. She denies any chance of pregnancy.  No fevers.  Diarrhea has not worsened since procedure.    Review of Systems   Gastrointestinal: Positive for abdominal pain and blood in stool.   Genitourinary: Negative for dysuria.   Musculoskeletal: Positive for back pain.   All other systems reviewed and are negative.    Allergies:  No Known Allergies    Medications:  Multiple Vitamin  Omeprazole  Ondansetron    Past Medical History:     Panic attack    Past Surgical History:    Pierce tooth extraction    Family History:    Mother: lung disease, osteoporosis  Father: CAD  Sister: GI problems, allergy    Social History:  The patient presents to the ED with a family member or friend.  She arrived to the ED via private transportation.    Physical Exam     Patient Vitals for the past 24 " hrs:   BP Temp Temp src Pulse Resp SpO2   08/03/22 0300 105/67 -- -- 77 -- 94 %   08/03/22 0230 104/73 -- -- 89 -- 94 %   08/03/22 0200 118/75 -- -- 90 -- 97 %   08/03/22 0140 -- -- -- -- -- 97 %   08/03/22 0135 -- -- -- -- -- 96 %   08/03/22 0130 -- -- -- -- -- 97 %   08/02/22 2345 109/69 -- -- 86 -- 98 %   08/02/22 2330 117/78 -- -- 89 -- 99 %   08/02/22 2315 110/69 -- -- 84 -- 98 %   08/02/22 2300 117/70 -- -- 87 -- 99 %   08/02/22 2255 -- -- -- -- -- 100 %   08/02/22 2250 124/82 -- -- -- -- 100 %   08/02/22 2148 120/82 97.8  F (36.6  C) Temporal 99 18 100 %       Physical Exam    Eyes:               Sclera white; Pupils are equal and round  ENT:                External ears and nares normal  CV:                  Rate as above with regular rhythm   Resp:               Breath sounds clear and equal bilaterally                          Non-labored, no retractions or accessory muscle use  GI:                   Abdomen is soft, mild tenderness upper, more pronounced tenderness across lower abdomen                          No rebound tenderness or peritoneal features  MS:                  Moves all extremities  Skin:                Warm and dry  Neuro:             Speech is normal and fluent. No apparent deficit.    Emergency Department Course     Imaging:  CT Abdomen Pelvis w Contrast   Final Result   IMPRESSION:    1.  Wall thickening of the proximal transverse colon suggesting colitis.   2.  Trace amount pelvic free fluid.   3.  2.6 cm follicular cyst right ovary.        Report per radiology    Laboratory:  Labs Ordered and Resulted from Time of ED Arrival to Time of ED Departure   CBC WITH PLATELETS - Abnormal       Result Value    WBC Count 20.8 (*)     RBC Count 4.44      Hemoglobin 14.9      Hematocrit 44.4            MCH 33.6 (*)     MCHC 33.6      RDW 11.5      Platelet Count 199     COMPREHENSIVE METABOLIC PANEL - Abnormal    Sodium 134 (*)     Potassium 3.7      Creatinine 0.76      Urea Nitrogen 8.1       Chloride 100      Carbon Dioxide (CO2) 25      Anion Gap 9      Glucose 107 (*)     Calcium 9.5      Protein Total 7.3      Albumin 4.8      Bilirubin Total 0.4      Alkaline Phosphatase 53      AST 23      ALT 18      GFR Estimate >90     ISTAT HCG QUALITATIVE PREGNANCY POCT - Normal    HCG Qualitative POCT Negative          Emergency Department Course:     Reviewed:  I reviewed nursing notes, vitals, past medical history and Care Everywhere    Assessments:  2239 I obtained history and examined the patient as noted above.   0120 I rechecked the patient and explained findings.   0301 I rechecked the patient and explained findings.     Consults:  0242 I spoke with Dr. Harding from GI regarding the patient's presentation, workup here in the ED, and plan of care.    Interventions:  2229 Zofran 4 mg IV  2230 NS 1 L IV  2247 Dilaudid 0.5 mg IV  0158 Dilaudid 0.5 mg IV    Disposition:  The patient was discharged to home.     Impression & Plan     Medical Decision Making:  She arrives with severe pain after colonoscopy earlier today. Immediately concerning is the possibility of complications such as perforation or retained air. Other abdominal pathologies such as infection, torsion, or stone were considered. Blood work came back notable for a high white blood count. She has not had any fevers, or copious diarrhea, or other infectious symptoms. The CT scan results were discussed with GI. At this point, they feel this is likely inflammatory rather than infectious and recommend against antibiotics. Pain medications for a day or two are fine but she should return immediately for any uncontrolled vomiting, pain, or the development of fever or diarrhea.    Diagnosis:    ICD-10-CM    1. Abdominal pain, generalized  R10.84    2. Status post colonoscopy with polypectomy  Z98.890    3. Colitis, proximal transverse colon  K52.9        Discharge Medications:  Discharge Medication List as of 8/3/2022  3:19 AM      START taking  these medications    Details   HYDROcodone-acetaminophen (NORCO) 5-325 MG tablet Take 1 tablet by mouth every 6 hours as needed for pain, Disp-10 tablet, R-0, [facility]             Scribe Disclosure:  I, Jose Miguel Garcia, am serving as a scribe at 10:44 PM on 8/2/2022 to document services personally performed by Julissa Kim MD based on my observations and the provider's statements to me.      Julissa Kim MD  08/03/22 [id number]

## 2022-08-03 NOTE — ED TRIAGE NOTES
Pt arrives from home w/ complaint of abd pain, nausea, and feeling lightheaded following colonoscopy today. Pt reports at 1400 she had a colonoscopy where they removed some polyps and biopsies, reports she was doing ok post and then at 1945 tonight she began having severe lower abd pain across her entire lower belly. Reports she spoke w/ her gastroenterologist who told her to come to the ED. Reports having diarrhea since colonoscopy, reports small amount of blood in stool. Reports when standing from the toilet pta she felt lightheaded and that she may pass out. No hx of bowel disease. A&O x 4.

## 2022-08-03 NOTE — DISCHARGE INSTRUCTIONS
Return immediately for fevers, chills, marked diarrhea, or other infectious symptoms.  Return if vomiting or pain are not controlled with medications.    Opioid Medication Information    You have been given a prescription for an opioid (narcotic) pain medicine and/or have received a pain medicine while here in the Emergency Department. These medicines can make you drowsy or impaired. You must not drive, operate dangerous equipment, or engage in any other dangerous activities while taking these medications. If you drive while taking these medications, you could be arrested for driving under the influence (DUI). Do not drink any alcohol while you are taking these medications.     Opioid pain medications can cause addiction. If you have a history of chemical dependency of any type, you are at a higher risk of becoming addicted to pain medications.  Only take these prescribed medications to treat your pain when all other options have been tried. Take it for as short a time and as few doses as possible. Store your pain pills in a secure place, as they are frequently stolen and provide a dangerous opportunity for children or visitors in your house to start abusing these powerful medications. We will not replace any lost or stolen medicine.    If you do not finish your medication, it is a good idea to get rid of it but please do not flush it down the toilet. Please dispose of the remaining medication at a local pharmacy or law enforcement facility. The [place] [employer] has additional information on medication disposal: https://www.pca.[facility]/living-green/managing-unwanted-medications.      Many prescription pain medications contain Tylenol  (acetaminophen), including Vicodin , Tylenol #3 , Norco , Lortab , and Percocet .  You should not take any extra pills of Tylenol  if you are using these prescription medications or you can get very sick.  Do not ever take more than 3000 mg of acetaminophen in any  24 hour period.    All opioids tend to cause constipation. Drink plenty of water and eat foods that have a lot of fiber, such as fruits, vegetables, prune juice, apple juice and high fiber cereal.  Take a laxative if you don t move your bowels at least every other day. Miralax , Milk of Magnesia, Colace , or Senna  can be used to keep you regular.

## 2022-08-04 ENCOUNTER — NURSE TRIAGE (OUTPATIENT)
Dept: NURSING | Facility: CLINIC | Age: 27
End: 2022-08-04

## 2022-08-04 ENCOUNTER — VIRTUAL VISIT (OUTPATIENT)
Dept: INTERNAL MEDICINE | Facility: CLINIC | Age: 27
End: 2022-08-04
Payer: COMMERCIAL

## 2022-08-04 DIAGNOSIS — D72.829 LEUKOCYTOSIS, UNSPECIFIED TYPE: Primary | ICD-10-CM

## 2022-08-04 DIAGNOSIS — F41.9 ANXIETY: ICD-10-CM

## 2022-08-04 PROCEDURE — 99214 OFFICE O/P EST MOD 30 MIN: CPT | Mod: TEL | Performed by: NURSE PRACTITIONER

## 2022-08-04 ASSESSMENT — PAIN SCALES - GENERAL: PAINLEVEL: SEVERE PAIN (7)

## 2022-08-04 NOTE — PROGRESS NOTES
Alicia is a 27 year old who is being evaluated via a billable telephone visit.      What phone number would you like to be contacted at? [phone]  How would you like to obtain your AVS? [facility]    Assessment & Plan   Problem List Items Addressed This Visit    None     Visit Diagnoses     Leukocytosis, unspecified type    -  Primary    Relevant Orders    CBC with platelets and differential    CRP, inflammation    Anxiety        Relevant Medications    vortioxetine (TRINTELLIX) 5 MG tablet    Other Relevant Orders    GeneSight (You MUST fill out the order form (see link in reference section below) and fax the completed form to [employer] in order for the test to be completed.)    GeneSight MTHFR       genesight testing to be scheduled.  [place] diet, advance as tolerated              MEDICATIONS:        - Continue other medications without change  FURTHER TESTING:       - labs    No follow-ups on file.    Annabelle Olvera NP  [facility]   Alicia is a 27 year old, presenting for the following health issues:  Results (Go over results.) and Medication Problem (Started dicyclomine yesterday and did not feel right after first dose. Has not taken it since.)      3 polyps during colonoscopy which were normal.  Patient was seen in ED which CT complete and was unremarkable.     Hx of kidney infection and does have some flank pain though had previously elevated WBC.    Ongoing abd discomfort with reduced eating     History of Present Illness       Reason for visit:  Persistent nausea, fatigue, loss of appetite, weakness, indigestion, anxiety    She eats 2-3 servings of fruits and vegetables daily.She consumes 0 sweetened beverage(s) daily.She exercises with enough effort to increase her heart rate 9 or less minutes per day.  She exercises with enough effort to increase her heart rate 3 or less days per week.   She is taking medications regularly.             Review of Systems   Unremarkable  other than listed above and below         Objective    Vitals - Patient Reported  Pain Score: Severe Pain (7)  Pain Loc: Abdomen        Physical Exam   healthy, alert and no distress  PSYCH: Alert and oriented times 3; coherent speech, normal   rate and volume, able to articulate logical thoughts, able   to abstract reason, no tangential thoughts, no hallucinations   or delusions  Her affect is normal  RESP: No cough, no audible wheezing, able to talk in full sentences  Remainder of exam unable to be completed due to telephone visits    Office Visit on 08/03/2022   Component Date Value Ref Range Status     Color Urine 08/03/2022 Yellow  Colorless, Straw, Light Yellow, Yellow Final     Appearance Urine 08/03/2022 Clear  Clear Final     Glucose Urine 08/03/2022 Negative  Negative mg/dL Final     Bilirubin Urine 08/03/2022 Negative  Negative Final     Ketones Urine 08/03/2022 80  (A) Negative mg/dL Final     Specific Gravity Urine 08/03/2022 1.020  1.003 - 1.035 Final     Blood Urine 08/03/2022 Negative  Negative Final     pH Urine 08/03/2022 5.5  5.0 - 7.0 Final     Protein Albumin Urine 08/03/2022 Negative  Negative mg/dL Final     Urobilinogen Urine 08/03/2022 0.2  0.2, 1.0 E.U./dL Final     Nitrite Urine 08/03/2022 Negative  Negative Final     Leukocyte Esterase Urine 08/03/2022 Negative  Negative Final               Phone call duration: 12 minutes    .  ..

## 2022-08-04 NOTE — TELEPHONE ENCOUNTER
Called pt to get her scheduled for VV per PCP. This appt is to go over results.       Pt sent in [employer] msg. Msg will be sent back regarding this.     Please assist in scheduling if pt calls back.       Thank you,  Charlie Rangel Jr., [facility] on 8/4/2022 at 12:51 PM

## 2022-08-04 NOTE — PROGRESS NOTES
Assessment & Plan     1. Flank pain  Unclear etiology. Pt is currently been worked up in the past month for abdominal pain.   UA is completely clear today without evidence of UTI. Suspect muscular.    She had an extensive workup yesterday in ER and was negative, I do not think it is necessary to repeat her CBC today.   Advised trying Bentyl for abdominal pain  Discussed indications to follow-up in ER.   - UA macro with reflex to Microscopic and Culture - Clinc Collect        Return in about 5 days (around 8/8/2022), or if symptoms worsen or fail to improve.    Diagnosis and treatment plan was reviewed with patient and/or family.   We went over any labs or imaging. Discussed worsening symptoms or little to no relief despite treatment plan to follow-up with PCP or return to clinic.  Patient verbalizes understanding. All questions were addressed and answered.     Isa Haney PA-C  [facility]    CHIEF COMPLAINT:   Chief Complaint   Patient presents with     Flank Pain     Pt had colonoscopy yesterday, now experiencing pain in kidney area. Abdominal pain. H/O GI issues.      Deya Vargas is a 27 year old female who presents to clinic today for evaluation of flank pain. Patient had a colonoscopy yesterday where she had 3 polyps and a biopsy of an area of inflammation. Several hours after the procedure, she developed severe abdominal pain. She was seen yesterday in the ER and was found to have leukocytosis. CT scan of abdomen was significant for colitis, which was suspected to be a inflammatory vs infectious. She has been taking Vicodin for her pain.   Today, developed bilateral flank pain. No dysuria, hematuria, fever or chills.       No past medical history on file.  Past Surgical History:   Procedure Laterality Date     WISDOM TOOTH EXTRACTION       Social History     Tobacco Use     Smoking status: Never Smoker     Smokeless tobacco: Never Used   Substance Use Topics     Alcohol  use: Yes     Alcohol/week: 1.0 standard drink     Types: 1 Standard drinks or equivalent per week     Comment: Alcoholic Drinks/day: Social      Current Outpatient Medications   Medication     dicyclomine (BENTYL) 20 MG tablet     HYDROcodone-acetaminophen (NORCO) 5-325 MG tablet     Multiple Vitamin (MULTIVITAMIN PO)     ondansetron (ZOFRAN) 4 MG tablet     No current facility-administered medications for this visit.     No Known Allergies    10 point ROS of systems were all negative except for pertinent positives noted in my HPI.      Exam:   /72   Pulse 75   Temp 97.6  F (36.4  C) (Tympanic)   Resp 14   LMP 07/19/2022 (Approximate)   SpO2 100%   Constitutional: healthy, alert and no distress  Head: Normocephalic, atraumatic.  Eyes: conjunctiva clear, no drainage  ENT: TMs clear and shiny luh, nasal mucosa pink and moist, throat without tonsillar hypertrophy or erythema  Neck: neck is supple, no cervical lymphadenopathy or nuchal rigidity  Cardiovascular: RRR  Respiratory: CTA bilaterally, no rhonchi or rales  Gastrointestinal: soft. Diffuse discomfort. NO rebound or rigidity.   Skin: no rashes  Neurologic: Speech clear, gait normal. Moves all extremities.    Results for orders placed or performed in visit on 08/03/22   UA macro with reflex to Microscopic and Culture - Clinc Collect     Status: Abnormal    Specimen: Urine, Midstream   Result Value Ref Range    Color Urine Yellow Colorless, Straw, Light Yellow, Yellow    Appearance Urine Clear Clear    Glucose Urine Negative Negative mg/dL    Bilirubin Urine Negative Negative    Ketones Urine 80  (A) Negative mg/dL    Specific Gravity Urine 1.020 1.003 - 1.035    Blood Urine Negative Negative    pH Urine 5.5 5.0 - 7.0    Protein Albumin Urine Negative Negative mg/dL    Urobilinogen Urine 0.2 0.2, 1.0 E.U./dL    Nitrite Urine Negative Negative    Leukocyte Esterase Urine Negative Negative    Narrative    Microscopic not indicated   Results for orders  placed or performed during the hospital encounter of 08/02/22   CT Abdomen Pelvis w Contrast     Status: None    Narrative    EXAM: CT ABDOMEN PELVIS W CONTRAST  LOCATION: [facility]  DATE/TIME: 8/3/2022 12:05 AM    INDICATION: abdominal pain after colonoscopy  COMPARISON: None.  TECHNIQUE: CT scan of the abdomen and pelvis was performed following injection of IV contrast. Multiplanar reformats were obtained. Dose reduction techniques were used.  CONTRAST: 58 mL Isovue 370    FINDINGS:   LOWER CHEST: Mild basilar atelectasis    HEPATOBILIARY: Normal.    PANCREAS: Normal.    SPLEEN: Normal.    ADRENAL GLANDS: Normal.    KIDNEYS/BLADDER: Normal.    BOWEL: Normal caliber. Wall thickening of the proximal transverse colon.    LYMPH NODES: Normal.    VASCULATURE: Unremarkable.    PELVIC ORGANS: 2.6 cm cyst right ovary. Trace amount of pelvic free fluid.    MUSCULOSKELETAL: Minimal degenerative change osseous structures.      Impression    IMPRESSION:   1.  Wall thickening of the proximal transverse colon suggesting colitis.  2.  Trace amount pelvic free fluid.  3.  2.6 cm follicular cyst right ovary.   Extra Tube ([place] Draw) *Canceled*     Status: None ()    Narrative    The following orders were created for panel order Extra Tube ([place] Draw).  Procedure                               Abnormality         Status                     ---------                               -----------         ------                       Please view results for these tests on the individual orders.   CBC (platelets, no diff)     Status: Abnormal   Result Value Ref Range    WBC Count 20.8 (H) 4.0 - 11.0 10e3/uL    RBC Count 4.44 3.80 - 5.20 10e6/uL    Hemoglobin 14.9 11.7 - 15.7 g/dL    Hematocrit 44.4 35.0 - 47.0 %     78 - 100 fL    MCH 33.6 (H) 26.5 - 33.0 pg    MCHC 33.6 31.5 - 36.5 g/dL    RDW 11.5 10.0 - 15.0 %    Platelet Count 199 150 - 450 10e3/uL   Comprehensive metabolic panel     Status: Abnormal    Result Value Ref Range    Sodium 134 (L) 136 - 145 mmol/L    Potassium 3.7 3.4 - 5.3 mmol/L    Creatinine 0.76 0.51 - 0.95 mg/dL    Urea Nitrogen 8.1 6.0 - 20.0 mg/dL    Chloride 100 98 - 107 mmol/L    Carbon Dioxide (CO2) 25 22 - 29 mmol/L    Anion Gap 9 7 - 15 mmol/L    Glucose 107 (H) 70 - 99 mg/dL    Calcium 9.5 8.6 - 10.0 mg/dL    Protein Total 7.3 6.4 - 8.3 g/dL    Albumin 4.8 3.5 - 5.2 g/dL    Bilirubin Total 0.4 <=1.2 mg/dL    Alkaline Phosphatase 53 35 - 104 U/L    AST 23 10 - 35 U/L    ALT 18 10 - 35 U/L    GFR Estimate >90 >60 mL/min/1.73m2   Extra Tube ([place] Draw)     Status: None    Narrative    The following orders were created for panel order Extra Tube ([place] Draw).  Procedure                               Abnormality         Status                     ---------                               -----------         ------                     Extra Blue Top Tube[[id number]]                              Final result               Extra Red Top Tube[[id number]]                               Final result                 Please view results for these tests on the individual orders.   Extra Blue Top Tube     Status: None   Result Value Ref Range    Hold Specimen JIC    Extra Red Top Tube     Status: None   Result Value Ref Range    Hold Specimen JIC    iStat HCG Qualitative Pregnancy, POCT     Status: Normal   Result Value Ref Range    HCG Qualitative POCT Negative Negative, Indeterminate

## 2022-08-04 NOTE — PATIENT INSTRUCTIONS
Try Bentyl for abdominal pain     Follow-up in ER if development of:  You get an oral temperature above 100.5F  You keep vomiting (throwing up) or cannot drink liquids.  You see blood when you vomit.   You cannot have a bowel movement or you cannot pass gas.  Your stomach gets bloated or bigger.  Your skin or the whites of your eyes look yellow.  You faint.  You have bloody, frequent or painful urination (peeing).

## 2022-08-04 NOTE — TELEPHONE ENCOUNTER
"Clinic Action Needed:Yes, please call patient [phone] (home)     Reason for Call:    Patient is requesting further interpretation of urine lab results from 8/3/22. Reporting results show elevated ketones (released in My Chart).    Requesting to know if this could be causing her kidney pain.    Patient reporting she was seen in Urgent Care on 8/3/22 for urine sample.  Reporting history of Colonoscopy 8/1/22 with pain following. Seen in ED on 8/2/22 and UC 8/3/22.    Per discharge instructions to follow up around 8/8/22 or sooner if symptoms worsen or change.    Ongoing bilateral flank pain, generalized abdominal pain with voiding. Rating pain level \"6\" on 1-10 pain scale. Last Vicodin dose taken at 1 a.m. today.    Afebrile.    Denies change in symptoms.     Patient declines scheduling follow up at this time. Prefers to wait on call back from clinic regarding lab results.    Reviewed with patient to call back with any change or increase in symptoms.       Reason for Disposition    Caller requesting lab results    Additional Information    Negative: Lab calling with strep throat test results and triager can call in prescription    Negative: Lab calling with urinalysis test results and triager can call in prescription    Negative: Medication questions    Negative: ED call to PCP    Negative: Physician call to PCP    Negative: Call about patient who is currently hospitalized    Negative: Lab or radiology calling with CRITICAL test results    Negative: [1] Prescription not at pharmacy AND [2] was prescribed today by PCP    Negative: [1] Follow-up call from patient regarding patient's clinical status AND [2] information urgent    Negative: [1] Caller requests to speak ONLY to PCP AND [2] urgent question    Negative: [1] Caller requests to speak to PCP now AND [2] won't tell us reason for call  (Exception: if 10 pm to 6 am, caller must first discuss reason for the call)    Negative: Notification of hospital " admission    Negative: Notification of death    Protocols used: PCP CALL - NO TRIAGE-A-    Elina Del Cid RN  [facility]

## 2022-08-05 ENCOUNTER — NURSE TRIAGE (OUTPATIENT)
Dept: NURSING | Facility: CLINIC | Age: 27
End: 2022-08-05

## 2022-08-05 ENCOUNTER — ALLIED HEALTH/NURSE VISIT (OUTPATIENT)
Dept: FAMILY MEDICINE | Facility: CLINIC | Age: 27
End: 2022-08-05
Payer: COMMERCIAL

## 2022-08-05 ENCOUNTER — HOSPITAL ENCOUNTER (OUTPATIENT)
Dept: CARDIOLOGY | Facility: HOSPITAL | Age: 27
Discharge: HOME | End: 2022-08-05
Attending: NURSE PRACTITIONER | Admitting: NURSE PRACTITIONER
Payer: COMMERCIAL

## 2022-08-05 DIAGNOSIS — R00.2 PALPITATIONS: ICD-10-CM

## 2022-08-05 DIAGNOSIS — F41.9 ANXIETY: ICD-10-CM

## 2022-08-05 PROCEDURE — 99207 PR NO CHARGE NURSE ONLY: CPT

## 2022-08-05 PROCEDURE — 93270 REMOTE 30 DAY ECG REV/REPORT: CPT

## 2022-08-05 NOTE — TELEPHONE ENCOUNTER
S: Lab results  B: Calling about her elevated CRP of 77.30.  She has a of HX of anxiety.  States this is causing her increased anxiety.  Colonoscopy 8/2 [facility]. 3 polyps removed which were normal.  8/2 went to ED for abdominal pain.  CT showing Wall thickening of the proximal transverse colon suggesting colitis.  A: Elevated CRP causing her anxiety  R: Sent message to Luz Marina Olvera NP to call patient back.     Any Gilliland RN, [facility] Triage Nurse Advisor        Addendum 8/5/2022 8:20 pm  Per Annabelle Olvera NP she would like writer to page on call provider with CRP results.  Writer paged on call, Dr. Bud Bernardo,  at 8:20 pm.  Per Dr. Bernardo   1) Have patient follow up with [facility] about Colitis.  2)  Stay well hydrated and eat.    3) Nothing to see a provider about over the weekend.  4) Colitis is causing elevated CPR.    Writer call patient back with Dr. Bernardo's directions.  Patient verbalized understanding of advice from provider..    Any Gilliland RN, [facility] Triage Nurse Advisor    Reason for Disposition    [1] Caller requesting NON-URGENT health information AND [2] PCP's office is the best resource    Protocols used: INFORMATION ONLY CALL - NO TRIAGE-A-

## 2022-08-10 DIAGNOSIS — K29.80 DUODENITIS: Primary | ICD-10-CM

## 2022-08-13 ENCOUNTER — LAB (OUTPATIENT)
Dept: LAB | Facility: CLINIC | Age: 27
End: 2022-08-13
Payer: COMMERCIAL

## 2022-08-13 DIAGNOSIS — K29.80 DUODENITIS: ICD-10-CM

## 2022-08-13 PROCEDURE — 86140 C-REACTIVE PROTEIN: CPT

## 2022-08-13 PROCEDURE — 36415 COLL VENOUS BLD VENIPUNCTURE: CPT

## 2022-08-14 LAB — CRP SERPL-MCNC: <3 MG/L

## 2022-08-15 PROCEDURE — 93272 ECG/REVIEW INTERPRET ONLY: CPT | Performed by: INTERNAL MEDICINE

## 2022-08-24 ENCOUNTER — TRANSFERRED RECORDS (OUTPATIENT)
Dept: HEALTH INFORMATION MANAGEMENT | Facility: CLINIC | Age: 27
End: 2022-08-24

## 2022-09-13 ENCOUNTER — MYC MEDICAL ADVICE (OUTPATIENT)
Dept: INTERNAL MEDICINE | Facility: CLINIC | Age: 27
End: 2022-09-13

## 2022-09-14 NOTE — TELEPHONE ENCOUNTER
Per [facility] lab order:  [facility] (You MUST fill out the order form (see link in reference section below) and fax the completed form to [employer] in order for the test to be completed.) [[id number]] (Order [id number])        Form printed & placed in PCP inbox for completion

## 2022-09-15 ENCOUNTER — MEDICAL CORRESPONDENCE (OUTPATIENT)
Dept: HEALTH INFORMATION MANAGEMENT | Facility: CLINIC | Age: 27
End: 2022-09-15

## 2022-09-16 NOTE — TELEPHONE ENCOUNTER
Form has been faxed. Original form has been placed in hold bin, and copy sent to scan.       Charlie Rangel Jr. [facility] on 9/16/2022 at 5:04 PM

## 2022-09-18 ENCOUNTER — HEALTH MAINTENANCE LETTER (OUTPATIENT)
Age: 27
End: 2022-09-18

## 2022-09-30 ENCOUNTER — OFFICE VISIT (OUTPATIENT)
Dept: PEDIATRICS | Facility: CLINIC | Age: 27
End: 2022-09-30
Payer: COMMERCIAL

## 2022-09-30 VITALS
TEMPERATURE: 98 F | DIASTOLIC BLOOD PRESSURE: 80 MMHG | WEIGHT: 130.6 LBS | HEIGHT: 65 IN | RESPIRATION RATE: 16 BRPM | BODY MASS INDEX: 21.76 KG/M2 | SYSTOLIC BLOOD PRESSURE: 102 MMHG | HEART RATE: 70 BPM | OXYGEN SATURATION: 100 %

## 2022-09-30 DIAGNOSIS — Z23 NEED FOR INFLUENZA VACCINATION: ICD-10-CM

## 2022-09-30 DIAGNOSIS — Z11.3 ROUTINE SCREENING FOR STI (SEXUALLY TRANSMITTED INFECTION): ICD-10-CM

## 2022-09-30 DIAGNOSIS — Z23 HIGH PRIORITY FOR COVID-19 VACCINATION: ICD-10-CM

## 2022-09-30 DIAGNOSIS — Z00.00 ROUTINE GENERAL MEDICAL EXAMINATION AT A HEALTH CARE FACILITY: Primary | ICD-10-CM

## 2022-09-30 LAB — T PALLIDUM AB SER QL: NONREACTIVE

## 2022-09-30 PROCEDURE — 87491 CHLMYD TRACH DNA AMP PROBE: CPT | Performed by: STUDENT IN AN ORGANIZED HEALTH CARE EDUCATION/TRAINING PROGRAM

## 2022-09-30 PROCEDURE — 90471 IMMUNIZATION ADMIN: CPT | Performed by: STUDENT IN AN ORGANIZED HEALTH CARE EDUCATION/TRAINING PROGRAM

## 2022-09-30 PROCEDURE — 99395 PREV VISIT EST AGE 18-39: CPT | Mod: 25 | Performed by: STUDENT IN AN ORGANIZED HEALTH CARE EDUCATION/TRAINING PROGRAM

## 2022-09-30 PROCEDURE — 0124A COVID-19,PF,PFIZER BOOSTER BIVALENT: CPT | Performed by: STUDENT IN AN ORGANIZED HEALTH CARE EDUCATION/TRAINING PROGRAM

## 2022-09-30 PROCEDURE — 91312 COVID-19,PF,PFIZER BOOSTER BIVALENT: CPT | Performed by: STUDENT IN AN ORGANIZED HEALTH CARE EDUCATION/TRAINING PROGRAM

## 2022-09-30 PROCEDURE — 90686 IIV4 VACC NO PRSV 0.5 ML IM: CPT | Performed by: STUDENT IN AN ORGANIZED HEALTH CARE EDUCATION/TRAINING PROGRAM

## 2022-09-30 PROCEDURE — 86780 TREPONEMA PALLIDUM: CPT | Performed by: STUDENT IN AN ORGANIZED HEALTH CARE EDUCATION/TRAINING PROGRAM

## 2022-09-30 PROCEDURE — 87591 N.GONORRHOEAE DNA AMP PROB: CPT | Performed by: STUDENT IN AN ORGANIZED HEALTH CARE EDUCATION/TRAINING PROGRAM

## 2022-09-30 PROCEDURE — 36415 COLL VENOUS BLD VENIPUNCTURE: CPT | Performed by: STUDENT IN AN ORGANIZED HEALTH CARE EDUCATION/TRAINING PROGRAM

## 2022-09-30 ASSESSMENT — ENCOUNTER SYMPTOMS
ARTHRALGIAS: 0
MYALGIAS: 0
PARESTHESIAS: 0
JOINT SWELLING: 0
NERVOUS/ANXIOUS: 1
PALPITATIONS: 0
BREAST MASS: 0
SORE THROAT: 0
HEMATOCHEZIA: 0
HEADACHES: 0
DYSURIA: 0
FEVER: 0
ABDOMINAL PAIN: 0
HEARTBURN: 1
SHORTNESS OF BREATH: 0
HEMATURIA: 0
WEAKNESS: 1
FREQUENCY: 1
CHILLS: 0
DIARRHEA: 0
DIZZINESS: 1
EYE PAIN: 0
COUGH: 0
NAUSEA: 1
CONSTIPATION: 0

## 2022-09-30 NOTE — PROGRESS NOTES
Assessment & Plan   Routine general medical examination at a health care facility    High priority for COVID-19 vaccination  - COVID-19,PF,PFIZER BOOSTER BIVALENT (12+YRS)    Need for influenza vaccination  - INFLUENZA VACCINE IM > 6 MONTHS VALENT IIV4 (AFLURIA/FLUZONE)    Routine screening for STI (sexually transmitted infection)  - NEISSERIA GONORRHOEA PCR; Future  - CHLAMYDIA TRACHOMATIS PCR; Future  - Treponema Abs w Reflex to RPR and Titer; Future  - NEISSERIA GONORRHOEA PCR  - CHLAMYDIA TRACHOMATIS PCR  - Treponema Abs w Reflex to RPR and Titer      Return in about 1 year (around 9/30/2023) for Annual Wellness Exam.    Noemy Park MD  [facility] Vargas is a 27 year old, presenting for the following health issues:  Establish Care     Healthy Habits:     Getting at least 3 servings of Calcium per day:  Yes    Bi-annual eye exam:  Yes    Dental care twice a year:  Yes    Sleep apnea or symptoms of sleep apnea:  None    Diet:  Regular (no restrictions)    Frequency of exercise:  2-3 days/week    Duration of exercise:  45-60 minutes    Taking medications regularly:  Yes    Medication side effects:  Not applicable and Significant flushing    PHQ-2 Total Score: 0    Additional concerns today:  Yes     Works as genetic counselor  Has dog   to       Ringing and fullness in left ear  -past month    Swollen lymph nodes       Review of Systems   Constitutional: Negative for chills and fever.   HENT: Negative for congestion, ear pain, hearing loss and sore throat.    Eyes: Positive for visual disturbance. Negative for pain.   Respiratory: Negative for cough and shortness of breath.    Cardiovascular: Negative for chest pain, palpitations and peripheral edema.   Gastrointestinal: Positive for heartburn and nausea. Negative for abdominal pain, constipation, diarrhea and hematochezia.   Breasts:  Positive for tenderness. Negative for breast mass and discharge.  "  Genitourinary: Positive for frequency, urgency and vaginal discharge. Negative for dysuria, genital sores, hematuria, pelvic pain and vaginal bleeding.   Musculoskeletal: Negative for arthralgias, joint swelling and myalgias.   Skin: Negative for rash.   Neurological: Positive for dizziness and weakness. Negative for headaches and paresthesias.   Psychiatric/Behavioral: Positive for mood changes. The patient is nervous/anxious.           Objective    /80 (BP Location: Right arm, Patient Position: Chair, Cuff Size: Adult Regular)   Pulse 70   Temp 98  F (36.7  C) (Tympanic)   Resp 16   Ht 1.651 m (5' 5\")   Wt 59.2 kg (130 lb 9.6 oz)   LMP 09/17/2022   SpO2 100%   BMI 21.73 kg/m    Body mass index is 21.73 kg/m .  Physical Exam   GENERAL: healthy, alert and no distress  EYES: Eyes grossly normal to inspection, and conjunctivae and sclerae normal  HENT: ear canals and TM's normal  NECK: no adenopathy, no asymmetry, masses, or scars and thyroid normal to palpation  RESP: lungs clear to auscultation - no rales, rhonchi or wheezes  BREAST: normal without masses, tenderness or nipple discharge and no palpable axillary masses or adenopathy  CV: regular rate and rhythm, normal S1 S2, no S3 or S4, no murmur, click or rub, no peripheral edema and peripheral pulses strong  ABDOMEN: soft, nontender, no hepatosplenomegaly, no masses  MS: no gross musculoskeletal defects noted, no edema  SKIN: no suspicious lesions or rashes  NEURO: Normal strength and tone, mentation intact and speech normal  PSYCH: mentation appears normal, affect normal/bright        "

## 2022-10-01 LAB
C TRACH DNA SPEC QL NAA+PROBE: NEGATIVE
N GONORRHOEA DNA SPEC QL NAA+PROBE: NEGATIVE

## 2022-10-17 ENCOUNTER — OFFICE VISIT (OUTPATIENT)
Dept: OBGYN | Facility: CLINIC | Age: 27
End: 2022-10-17
Payer: COMMERCIAL

## 2022-10-17 VITALS
DIASTOLIC BLOOD PRESSURE: 72 MMHG | BODY MASS INDEX: 21.61 KG/M2 | SYSTOLIC BLOOD PRESSURE: 116 MMHG | WEIGHT: 129.7 LBS | HEIGHT: 65 IN

## 2022-10-17 DIAGNOSIS — Z12.4 SCREENING FOR MALIGNANT NEOPLASM OF CERVIX: ICD-10-CM

## 2022-10-17 DIAGNOSIS — Z31.69 ENCOUNTER FOR PRECONCEPTION CONSULTATION: ICD-10-CM

## 2022-10-17 DIAGNOSIS — Z01.419 ENCOUNTER FOR GYNECOLOGICAL EXAMINATION WITHOUT ABNORMAL FINDING: Primary | ICD-10-CM

## 2022-10-17 PROCEDURE — 99385 PREV VISIT NEW AGE 18-39: CPT | Performed by: OBSTETRICS & GYNECOLOGY

## 2022-10-17 PROCEDURE — G0145 SCR C/V CYTO,THINLAYER,RESCR: HCPCS | Performed by: OBSTETRICS & GYNECOLOGY

## 2022-10-17 NOTE — PATIENT INSTRUCTIONS
Preconception Care    If you are thinking about becoming pregnant in the near future or actively trying to conceive we want to make sure you are as healthy as possible before becoming pregnant. By meeting with your provider prior to getting pregnant it allows us to address any health needs that can affect pregnancy. Please discuss your personal and family history with your provider in order for us to identify any risk factors    If you wish to attempt pregnancy stop whichever form of contraception you use. If you have an IUD it can be removed in the office and you can start trying right away. If you take birthcontrol pills finish current pack, cycle, and then you can actively start trying    Make sure all the medications you are taking are safe for pregnancy. This is especially important for women with chronic health conditions such as diabetes, seizure disorders, and/or hypertension. If you are unsure if your medications are safe we can discuss them with you, do not abruptly stop taking something if you've been prescribed a medication by a medical provider    Folic acid 400-800 mcg per day by mouth daily, begin this routine 1 to 12 months prior to planned conception, and continue through at least 13 weeks gestation. Some prenatal/multi-vitamins contain enough folic acid     Be up to date on all your vaccines. Avoid pregnancy for 1 month after administration of varicella/ Rubella (MMR) vaccines    We encourage all women to be at healthy BMI (<26) when attempting pregnancy, it is healthier for both you and your baby. If you are overweight you can make a healthy choice by eating better and exercising more. Waiting to get pregnant at a healthy weight is an excellent choice.                                     Eat a healthy, well-balanced diet containing lots of fresh fruit and vegetables (frozen without added sugar is okay), protein, and healthy carbohydrates such as whole wheat breads and pastas    Exercise regularly.  If you are wishing to get pregnant maintain normal exercise routine. If you don't exercise this is a great time for light activity daily such as walking/swimming    Limit caffeine intake to less than 200 mg per day, typically 1-2 cups of regular coffee is about 200 mg.     Avoid cigarettes, alcohol, and recreational drug use while attempting pregnancy. If you are actively trying to conceive but you get your period or a negative pregnancy test it is okay to have alcohol in moderation until you are actively trying again    If you have the potential to toxic chemical exposures in your work place or home please use special precautions    Menstrual Cycles    Knowing your cycle is incredibly important when attempting pregnancy    Your cycle begins day 1 of your period and goes until the 1st day of your next period. Typically women have 28-32 day cycles. If your cycles are shorter or longer it can be a normal variation.     Women typically ovulate in the middle of their cycle    There are many great apps that can help you track you cycle monthly to establish when you are ovulating    You may also start taking your temperature daily with a basal body temp thermometer to help identify when you ovulate    There are also ovulation predictor kits available over the counter at the pharmacy    If you want more information please contact the clinic      It can take up to 1 year for a woman under the age of 35 or 6 months for a woman over the age of 35 to conceive. If it takes longer than this we would like to evaluate you for fertility issues.      Normal Fertility:    - In the first six months of attempting pregnancy, approximately 80 percent of couples will conceive; in the first 12 months, approximately 85 percent will conceive.    - The highest probability of conception occurs when intercourse takes place one to two days prior to ovulation and on the day of ovulation   - Timing of ovulation depends on your cycle length and the  regularity of your cycle. The average cycle length is 28 days from the first day of 1 period to the first day of the next period. If your cycles are 28 days long you will likely ovulate on day 14 of your cycle. I usually recommend that couples have intercourse every other day from day 10 to day 20 of your cycle.     Women can attempt to predict the time of ovulation by tracking changes in cervical mucus (highest probability of conception is on the day of peak production of slippery clear mucus) or by using a kit to measure urinary luteinizing hormone (LH). Use of a home ovulation test kit may decrease the time to conception, particularly in women with irregular cycles or couples who have sexual intercourse infrequently.    Preventive Health Recommendations  Female Ages 26 - 39  Yearly exam:    See your health care provider every year in order to  Review health changes.    Discuss preventive care.     Review your medicines if you your doctor has prescribed any.    Until age 30: Get a Pap test every three years (more often if you have had an abnormal result).    After age 30: Talk to your doctor about whether you should have a Pap test every 3 years or have a Pap test with HPV screening every 5 years.    You do not need a Pap test if your uterus was removed (hysterectomy) and you have not had cancer or high grade dysplasia.  You should be tested each year for STDs (sexually transmitted diseases), if you're at risk.   Talk to your provider about how often to have your cholesterol checked.  If you are at risk for diabetes, you should have a diabetes test (fasting glucose).  Shots: Get a flu shot each year. Get a tetanus shot every 10 years.    Nutrition:    Eat at least 5 servings of fruits and vegetables each day.  Eat whole-grain bread, whole-wheat pasta and brown rice, quinoa, faro, or barley instead of white grains and rice.  For bone health:  Eat calcium-rich foods. Also take vitamin D (1000 IUs) each day.  Your  total calcium intake should be 1000mg per day (3 servings of dairy daily or twice daily supplements)    Lifestyle  Exercise at least 150 minutes a week each week (30 minutes a day, 5 days a week). This will help you control your weight and prevent disease. If your goal is to lose weight you will need to increase the duration and intensity of your exercise regimen  Limit alcohol to one drink per day.  No smoking.    Wear sunscreen to prevent skin cancer.  See your dentist every six months for an exam and cleaning

## 2022-10-17 NOTE — NURSING NOTE
"Chief Complaint   Patient presents with     Establish Care     Consultation re: pregnancy planning     Gyn Exam     initial /72   Ht 1.638 m (5' 4.5\")   Wt 58.8 kg (129 lb 11.2 oz)   LMP 10/17/2022   BMI 21.92 kg/m   Estimated body mass index is 21.92 kg/m  as calculated from the following:    Height as of this encounter: 1.638 m (5' 4.5\").    Weight as of this encounter: 58.8 kg (129 lb 11.2 oz).  BP completed using cuff size regular.  Destiny Dominguez CMA    "

## 2022-10-17 NOTE — PROGRESS NOTES
"  SUBJECTIVE:                                                   Alicia Reyes is a 27 year old  female who presents to clinic today for preventive gyn exam.   Periods are regular q 29-31 days w/mild cramping. No intermenstrual bleeding, spotting, or discharge. no dyspareunia.  no hx STIs.  Hoping to conceive soon. On PNV.   Patient's last menstrual period was 10/17/2022.     Current contraception: condoms    Exercise: 4-5 days/wk at the gym    ROS:  Breast: no nipple discharge, lumps, skin changes    No results found for: PAP     Family history of breast CA: No  Family history of uterine/ovarian CA: No  Family history of colon CA: No    Patient Active Problem List   Diagnosis     Pain in axilla, left     Panic attack     Tenderness of chest wall     Past Surgical History:   Procedure Laterality Date     COLONOSCOPY  2022     WISDOM TOOTH EXTRACTION        Social History     Tobacco Use     Smoking status: Never     Smokeless tobacco: Never   Substance Use Topics     Alcohol use: Yes     Alcohol/week: 1.0 standard drink     Types: 1 Standard drinks or equivalent per week     Comment: Alcoholic Drinks/day: Social       Problem (# of Occurrences) Relation (Name,Age of Onset)    Heart Disease (1) Father    Osteoporosis (1) Mother    GI problems (1) Sister    LUNG DISEASE (1) Mother    Allergy (Severe) (1) Sister    Cervical Cancer (1) Maternal Grandfather    Lung Cancer (1) Maternal Grandmother            PRENATAL VIT-FE BISG-FA-DHA PO,     No current facility-administered medications on file prior to visit.    No Known Allergies    Problem list and histories reviewed and adjusted as indicated.     OBJECTIVE:   /72   Ht 1.638 m (5' 4.5\")   Wt 58.8 kg (129 lb 11.2 oz)   LMP 10/17/2022   BMI 21.92 kg/m      Gen: Healthy appearing female, no acute distress, comfortable  HENT: No scleral injection or icterus, neck supple  CV: Regular rate, well perfused  Resp: normal work of breathing, no " cough  Breast Exam: No visible masses or suspicious skin changes.  No discrete or dominant masses to palpation.  No axillary lymphadenopathy.  GI: Abdomen soft, non-tender. No masses, organomegaly  Skin: No suspicious lesions or rashes  Psychiatric: mentation appears normal and affect bright    : External genitalia normal well-estrogenized, healthy tissue.  No obvious excoriations, lesions, or rashes. Bartholins, urethra, skeins normal.  Normal pink vaginal mucosa.  SSE: Normal cervix, moderate blood in the vault c/w menstruation  Bimanual: No CMT, small mobile anteverted uterus. No adnexal masses or tenderness appreciated.     ASSESSMENT/PLAN:                                                    Alicia Reyes is a 27 year old female  with satisfactory annual exam and preconception counseling.      PLAN:  Dx:  1)  Cervical cancer screening: collected today, she believes her last pap was in 2019.  2)  Contraception: condoms until ready to conceive  3)  Preconception counseling: General counseling and advice for procreative management.  Medical history and medications reviewed.  Recommend regular exercise, healthy eating. Discussed ovulation timing, timed intercourse.  Recommend follow-up for infertility evaluation if no conception after 12 mths of unprotected intercourse, or persistent irregular menses prior to that time.  Recommend prenatal vitamins.     PE:  Reviewed health maintenance including diet, regular exercise   and periodic exams.    Return to clinic as needed and in 12 months if unable to conceive.    COUNSELING:   Reviewed preventive health counseling, as reflected in patient instructions   reports that she has never smoked. She has never used smokeless tobacco.        Yaz Arias MD   Obstetrics and Gynecology

## 2022-10-19 LAB
BKR LAB AP GYN ADEQUACY: NORMAL
BKR LAB AP GYN INTERPRETATION: NORMAL
BKR LAB AP HPV REFLEX: NORMAL
BKR LAB AP LMP: NORMAL
BKR LAB AP PREVIOUS ABNORMAL: NORMAL
PATH REPORT.COMMENTS IMP SPEC: NORMAL
PATH REPORT.COMMENTS IMP SPEC: NORMAL
PATH REPORT.RELEVANT HX SPEC: NORMAL

## 2022-10-24 ENCOUNTER — OFFICE VISIT (OUTPATIENT)
Dept: OBGYN | Facility: CLINIC | Age: 27
End: 2022-10-24
Payer: COMMERCIAL

## 2022-10-24 VITALS
BODY MASS INDEX: 21.49 KG/M2 | HEART RATE: 64 BPM | WEIGHT: 129 LBS | DIASTOLIC BLOOD PRESSURE: 60 MMHG | HEIGHT: 65 IN | SYSTOLIC BLOOD PRESSURE: 102 MMHG

## 2022-10-24 DIAGNOSIS — Z31.69 ENCOUNTER FOR PRECONCEPTION CONSULTATION: ICD-10-CM

## 2022-10-24 DIAGNOSIS — Z13.71 TESTING OF FEMALE FOR GENETIC DISEASE CARRIER STATUS: ICD-10-CM

## 2022-10-24 DIAGNOSIS — Z14.8 ALPHA-1-ANTITRYPSIN DEFICIENCY CARRIER: Primary | ICD-10-CM

## 2022-10-24 PROCEDURE — 36415 COLL VENOUS BLD VENIPUNCTURE: CPT | Performed by: OBSTETRICS & GYNECOLOGY

## 2022-10-24 PROCEDURE — 99213 OFFICE O/P EST LOW 20 MIN: CPT | Performed by: OBSTETRICS & GYNECOLOGY

## 2022-10-24 NOTE — PROGRESS NOTES
"SUBJECTIVE:   CC: carrier screening                                               Alicia Reyes is a 27 year old  female who presents to clinic today to review options for carrier screening. 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 She requests verification and broad screening.    Problem list and histories reviewed & adjusted, as indicated.  Additional history: as documented.       PRENATAL VIT-FE BISG-FA-DHA PO,     No current facility-administered medications on file prior to visit.    No Known Allergies    OBJECTIVE:   /60   Pulse 64   Ht 1.638 m (5' 4.5\")   Wt 58.5 kg (129 lb)   LMP 10/17/2022   BMI 21.80 kg/m     GENERAL: Healthy, alert and no distress  EYES: Eyes grossly normal to inspection.  No discharge or erythema, or obvious scleral/conjunctival abnormalities.  HENT: Normal cephalic/atraumatic.  External ears, nose and mouth without ulcers or lesions.  No nasal drainage visible.  NECK: No asymmetry, visible masses or scars  RESP: No audible wheeze, cough, or visible cyanosis.  No visible retractions or increased work of breathing.   MS: No gross musculoskeletal defects noted.  Normal range of motion.  No visible edema.  SKIN: Visible skin clear. No significant rash, abnormal pigmentation or lesions.  NEURO: Mentation and speech appropriate for age.  PSYCH: Mentation appears normal, affect normal/bright, judgement and insight intact, normal speech and appearance well-groomed.     ASSESSMENT/PLAN:                                                    Alicia Reyes is a 27 year old female  here to review carrier screening options. We discussed the most common screening in addition to the plan for partner testing if any abnormalities are detected in her screen. She prefers to proceed with the most extensive screen available in addition to confirming alpha-1-antitrypsan " deficiency, which was added on.     1. Testing of female for genetic disease carrier status  - [facility] Carrier Screening; Future    3. Alpha-1-antitrypsin deficiency carrier  - desires confirmation of diagnosis.    20 minutes spent on the date of the encounter doing chart review, patient visit and documentation      Yaz Arias MD  Obstetrics and Gynecology   [facility]

## 2022-10-24 NOTE — NURSING NOTE
"Chief Complaint   Patient presents with     Consult     Discuss pre-conception testing.       Initial /60   Pulse 64   Ht 1.638 m (5' 4.5\")   Wt 58.5 kg (129 lb)   LMP 10/17/2022   BMI 21.80 kg/m   Estimated body mass index is 21.8 kg/m  as calculated from the following:    Height as of this encounter: 1.638 m (5' 4.5\").    Weight as of this encounter: 58.5 kg (129 lb).  BP completed using cuff size: regular    Questioned patient about current smoking habits.  Pt. has never smoked.          The following HM Due: NONE      The following patient reported/Care Every where data was sent to:  P ABSTRACT QUALITY INITIATIVES [[id number]]  Kassi Sanders LPN               "

## 2022-11-04 ENCOUNTER — MYC MEDICAL ADVICE (OUTPATIENT)
Dept: OBGYN | Facility: CLINIC | Age: 27
End: 2022-11-04

## 2022-11-04 DIAGNOSIS — Z14.8 ALPHA-1-ANTITRYPSIN DEFICIENCY CARRIER: Primary | ICD-10-CM

## 2022-11-04 LAB — SCANNED LAB RESULT: ABNORMAL

## 2022-11-04 NOTE — TELEPHONE ENCOUNTER
Do you want referral to [facility] for recent carrier screen results?    Do you want partner tested prior to that?    Rocio JACKSON RN BSN

## 2022-11-06 NOTE — TELEPHONE ENCOUNTER
Please place order for genetic counseling through [facility]. Please also contact them and find out if their preference is a carrier screen for those 2 traits prior to visit or if they want to meet with the couple first.     Thanks,  Yaz Arias MD

## 2022-11-07 ENCOUNTER — TRANSCRIBE ORDERS (OUTPATIENT)
Dept: MATERNAL FETAL MEDICINE | Facility: CLINIC | Age: 27
End: 2022-11-07

## 2022-11-07 DIAGNOSIS — Z31.69 ENCOUNTER FOR PRECONCEPTION CONSULTATION: Primary | ICD-10-CM

## 2022-11-15 ENCOUNTER — OFFICE VISIT (OUTPATIENT)
Dept: MATERNAL FETAL MEDICINE | Facility: CLINIC | Age: 27
End: 2022-11-15
Attending: OBSTETRICS & GYNECOLOGY
Payer: COMMERCIAL

## 2022-11-15 DIAGNOSIS — Z31.69 ENCOUNTER FOR PRECONCEPTION CONSULTATION: ICD-10-CM

## 2022-11-15 DIAGNOSIS — Z13.71 SCREENING FOR GENETIC DISEASE CARRIER STATUS: ICD-10-CM

## 2022-11-15 DIAGNOSIS — Z84.81 FAMILY HISTORY OF GENETIC DISEASE CARRIER: Primary | ICD-10-CM

## 2022-11-15 DIAGNOSIS — Z14.8 ALPHA-1-ANTITRYPSIN DEFICIENCY CARRIER: ICD-10-CM

## 2022-11-15 PROCEDURE — 999N000069 HC STATISTIC GENETIC COUNSELING, < 16 MIN

## 2022-11-15 NOTE — PROGRESS NOTES
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 She was accompanied by her partner, Jayro.      Impression/Plan:   1.  Alicia previously had [facility] comprehensive carrier screening (289 genes) plus add-on testing for SERPINA1 carrier status (290 total). She added on the testing for SERPINA1 after finding out she was a carrier through [facility]. 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    2. Alicia's partner, Jayro, elected to pursue expanded carrier screening for 290 conditions (older comprehensive panel + add-on alpha-antitrypsin deficiency) through [facility]. Results are expected within 2-3 weeks, and will be available in [facility].  We will contact the couple to discuss the results. We will contact Alicia with results once they are available. Authorization to share protected health information was signed at today's visit.    3. We discussed consideration of in-vitro fertilization with pre-implantation genetic testing for monogenic diseases (IVF with PGT-M) or amniocentesis, should the couple both be identified to carry the same condition.    Pregnancy History:   /Parity:    Age at Delivery: 27 year old    Alicia beauchamp pregnancy history is significant for one miscarriage in .    Medical History:   Alicia beauchamp reported medical history is not expected to impact pregnancy management or risks to fetal development.       Family History:   A three-generation pedigree was not obtained as a part of this session.   The following significant findings were reported by  Alicia:    Alicia has an uncle who has Down Syndrome due to nondisjunction.    Jayro's cousin's daughter has Kabuki Syndrome.    Jayro's mother had leukemia in her fifties.    Jayro's grandfather had esophageal cancer when he was older than 50. He was a smoker.    Otherwise, the reported family history is negative for multiple miscarriages, stillbirths, birth defects, intellectual disability, cancer <50y, known genetic conditions, and consanguinity.       Carrier Screening:   The patient reports that she and the father of the pregnancy have  ancestry:     Cystic fibrosis is an autosomal recessive genetic condition that occurs with increased frequency in individuals of  ancestry and carrier screening for this condition is available.  In addition,  screening in the [facility] includes cystic fibrosis.  Comprehensive carrier screening for mutations in a large panel of genes associated with autosomal recessive conditions including cystic fibrosis, Thalassemias, hearing loss, spinal muscular atrophy, and others, is now available. We also reviewed that comprehensive carrier screening can assess for common X-linked recessive disorders such as Fragile X syndrome.   We discussed that comprehensive carrier screening is designed to identify carrier status for conditions that are primarily childhood or adolescent onset. Comprehensive carrier screening does not evaluate for adult-onset conditions such as hereditary cancer syndromes, dementia/ Alzheimer's disease, or cardiovascular disease risk factors. Additionally, comprehensive carrier screening is not comprehensive for all known genetic diseases or inherited conditions. It does not screen for autosomal dominant hereditary conditions. This is a screening test, and residual carrier status risk figures will be provided to the patient after results become available. Carrier screening is not meant to diagnose the patient with a condition, and generally  carriers are asymptomatic. However, certain genes may confer increased risks for various health concerns in carriers (for example, but not limited to: EDEL, FMR1, DMD, etc). Patients are encouraged to share results with their primary care providers to ensure appropriate screening. If we are notified by the performing laboratory of a variant reclassification, the patient will be contacted.  We reviewed availability of comprehensive carrier screening through [facility] for up to 289 conditions. [facility] can also screen for fewer conditions via a variety of panels. [facility] laboratory will report on pseudodeficiency alleles, which are benign variants that are not known to be associated with disease and are not thought to impact the individuals risk to be a carrier for these conditions.  However, the presence of pseudodeficiency alleles can exhibit false positive results on biochemical tests such as  screen. This will be addressed with the patient should a pseudodeficiency allele be identified. Additionaly, [facility] laboratory will report the common 5T CFTR allele in isolation.   [facility] recently updated their comprehensive carrier screening panel so it now includes 556 genes. I showed Alicia and Jayro the informational packet about their latest comprehensive carrier screening, and Alicia's carrier screening results which tested for 290 conditions.  Jayro elected to pursue comprehensive carrier screening + testing for alpha-1 antitrypsin deficiency, the SERPINA1 gene (290 conditions total), via [facility] today. Results will be available in approximately 2-3 weeks from the time of blood draw. Coverage for carrier screening can be variable by insurance companies. We reviewed the self-pay option that will be available to the patient and her partner ($250 for one test, $100 for partner testing) should insurance not cover the cost of testing, or, if with coverage, cost of testing is greater than $350 combined.   Alicia is a carrier  for two conditions. We reviewed both of these conditions.  Smith-Lemli-Opitz syndrome (SLOS) (DHCR7: c.964-1G>C (splice acceptor)):  This condition is caused by deficiency of an enzyme involved in cholesterol synthesis. It can be a very severe condition, impacting many body systems, though it can vary in severity even among individuals of the same family.   Infants with severe SLOS have hypotonia, feeding difficulties, and slow growth. Microcephaly, cleft palate, finger and toe anomalies, and short stature are common. The condition can also present with behavioral concerns, intellectual disability, and autism. Other body systems commonly impacted include the heart, lungs, kidneys, and GI tract. In males, underdeveloped genitalia are common.   Individuals with the more mild forms may only present with hypotonia and subtle facial or toe characteristics. They may have mild intellectual disability, or typical cognitive function.   Life expectancy depends on severity of symptoms, and approximately 25% of individuals with SLOS pass away in childhood.   The carrier frequency for those of  ancestry is 1 in 50, putting the couple at a 1/200 (0.5%) chance of having a pregnancy affected by this condition. Carrier testing of Jayro will further clarify this risk.    Alpha-1-antitrypsin deficiency (SERPINA1: c.1096G>A ([id number])):    This condition can cause lung disease (COPD and emphysema) and liver disease.    There are a number of possible genotypes which ultimately contribute to the severity of the disorder in individuals: Z/Z being the most severe due to the lack off alpha-1-antitrypsin made by the body; S/S being a moderate form of the disease due to some production of alpha-1-antitrypsin; M/M unaffected status due to wild-type amount of alpha-1-antitrypsin produced. There are also a variety of combinations for these allele types that can result in varying degrees of clinical outcomes (ie. Individuals with S/Z  or M/S).     Environmental factors, such as cigarette smoke, chemicals, and dust, likely impact the severity of alpha-1 antitrypsin deficiency; the condition exhibits variable expressivity.    All of the couple's pregnancies will at least be a carrier of this condition and may have a slightly increased chance for liver or lung disease compared to the general population.      The carrier frequency for those of  ancestry is 1 in 10, putting the couple at a 1/40 (2.5%) chance of having a pregnancy affected by this condition. Carrier screening of Jayro will further clarify this risk.        Testing Options:   We discussed the following options:   Genetic Amniocentesis    Invasive procedure typically performed in the second trimester by which amniotic fluid is obtained for the purpose of chromosome analysis and/or other prenatal genetic analysis    Diagnostic results; >99% sensitivity for fetal chromosome abnormalities    AFAFP measurement tests for open neural tube defects    We also discussed in-vitro fertilization with pre-implantation genetic testing for monogenic diseases (IVF with PGT-M)    We reviewed the benefits and limitations of this testing.  Screening tests provide a risk assessment specific to the pregnancy for certain fetal chromosome abnormalities, but cannot definitively diagnose or exclude a fetal chromosome abnormality.  Follow-up genetic counseling and consideration of diagnostic testing is recommended with any abnormal screening result.      It was a pleasure to be involved with Alicia [facility]. Face-to-face time of the meeting was 20 minutes.    Giana Galeana MS, [facility]  Licensed Genetic Counselor  [facility]  Pager: [phone]  Office: [phone]     Patient evaluated and discussed with the genetic counseling intern. I have verified the content of the note, which accurately reflects my assessment of the patient and the plan of care.    Supervising Genetic Counselor  Tania  MS Jesse, [facility]   Licensed, Certified Genetic Counselor   [facility]  Maternal Fetal Medicine  [place] Office: [phone]   Pager: [phone]

## 2022-11-28 ENCOUNTER — TELEPHONE (OUTPATIENT)
Dept: MATERNAL FETAL MEDICINE | Facility: CLINIC | Age: 27
End: 2022-11-28

## 2022-11-28 NOTE — TELEPHONE ENCOUNTER
11-28-22    I called Alicia and left a message to disclose the carrier screening results for her partner, Jayro Hubbard. Jayro had comprehensive carrier screening through [facility] for 290 genes. He had their comprehensive panel + add-on testing for SERPINA1. Alicia is a carrier for alpha-1-antitrypsin deficiency (SERPINA1) and Bowers Lemli Opitz (DHCR7). Jayro was not found to be a carrier for either condition, reducing the risk that the couple could have a pregnancy affected by one of these conditions.    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    Villalba Syndrome: VPS13B: c. 31802K>T ([id number]*)    Villalba syndrome is characterized by moderate-to-severe intellectual disability, motor disabilities, dysmorphic facial features, and often microcephaly and hypotonia. Individuals with Villalba syndrome may also hold weight in a different way than is typical, with weight accruing in the torso.     This condition also leads to frequent infection. It also causes severe, progressive vision problems and functional blindness. Individuals with Villalba syndrome also tend to be unusually friendly and cheerful, even towards strangers.    Since Alicia was not found to carry this condition, the residual reproductive risk for the couple is significantly reduced.    Jayro is not expected to have symptoms for Villalba Syndrome as he is a carrier. If the couple has any questions, they can call me. Results will be available for review in [facility].    Giana Galeana MS, [facility]  Licensed Genetic Counselor  [facility]  Pager: [phone]  Office: [phone]

## 2023-02-15 NOTE — TELEPHONE ENCOUNTER
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    72 Gy in 36 fx from  to 2018, over 4 months, due to several long treatment breaks. Only 32 Gy elective level 2-4 dori RT (poor tolerance of even mild toxicity). INTERIM HISTORY   Here for routine follow-up. She has been well. No new medical problems. Voice has been good. Saw ENT recently and everything looked okay. No swallowing issues. Her grandson  from metastatic sarcoma. She is helping her daughter through this loss. PHYSICAL EXAM   Well-appearing, no acute distress. Regular rate and rhythm. Clear to auscultation. No cervical or supraclavicular adenopathy. No submental edema. No neck fibrosis.     IMPRESSION   4 years 8 months since completion of radiation therapy, no evidence of disease    Plans to see Dr. Katy Solo in 1 year    I will see her on an as needed basis at this point      Nancy Ansari MD  Radiation Oncology Sent to stephanie to address as primary not in office

## 2023-02-17 ENCOUNTER — OFFICE VISIT (OUTPATIENT)
Dept: OTOLARYNGOLOGY | Facility: CLINIC | Age: 28
End: 2023-02-17
Payer: COMMERCIAL

## 2023-02-17 DIAGNOSIS — R29.898 NECK TIGHTNESS: Primary | ICD-10-CM

## 2023-02-17 DIAGNOSIS — H93.8X2 EAR FULLNESS, LEFT: ICD-10-CM

## 2023-02-17 PROCEDURE — 99214 OFFICE O/P EST MOD 30 MIN: CPT | Performed by: OTOLARYNGOLOGY

## 2023-02-17 NOTE — LETTER
2/17/2023         RE: Alicia Reyes  [address]  [place] [address]        Dear Colleague,    Thank you for referring your patient, Alicia Reyes, to the [facility]. Please see a copy of my visit note below.    HPI: This patient is a 25yo F who presents to clinic for re-evaluation of ear pressure. She was referred back for vocal strain, but is not experiencing this currently and wishes to discuss her left ear. She was seen a year ago with neck pain and ear fullness on both sides. She was diagnosed with TMJ at that time. She did go to  and is wearing a bite guard and is doing much better. She still feels some pressure/water feeling in her left ear. That is the side with the neck tightness. Denies fevers, unintentional weight loss, odynophagia, dysphagia, hemoptysis, voice changes, and shortness of breath.      Past medical history, surgical history, social history, family history, medications, and allergies have been reviewed with the patient and are documented above.     Review of Systems: a 10-system review was performed. Pertinent positives are noted in the HPI and on a separate scanned document in the chart.     PHYSICAL EXAMINATION:  GEN: no acute distress, normocephalic  EYES: extraocular movements are intact, pupils are equal and round. Sclera clear.   EARS: auricles are normally formed. The external auditory canals are clear with minimal to no cerumen. Tympanic membranes are intact bilaterally with no signs of infection, effusion, retractions, or perforations. There are a few spots of very thin TM on the left, but intactness was confirmed under binocular microscopy.  NOSE: anterior nares are patent.   OC/OP: clear, dentition is in good repair but with wear patterns suggestive of clenching. The tongue and palate are fully mobile and symmetric. No masses or lesions.  NECK: soft and supple. No concerning lymphadenopathy or masses. Airway is midline. Bilateral TMJ  tenderness with continued L>R tightness of the SCM  NEURO: CN VII and XII symmetric. alert and oriented. No spontaneous nystagmus. Gait is normal.  PULM: breathing comfortably on room air, normal chest expansion with respiration    MEDICAL DECISION-MAKING: This patient is a 27yo F with continued ear discomfort/neck muscle tightness from TMD. Reassurance given and advised that she continue to do her PT and wear her dental guard.      Again, thank you for allowing me to participate in the care of your patient.        Sincerely,        Mily Lopez MD

## 2023-02-17 NOTE — PROGRESS NOTES
HPI: This patient is a 27yo F who presents to clinic for re-evaluation of ear pressure. She was referred back for vocal strain, but is not experiencing this currently and wishes to discuss her left ear. She was seen a year ago with neck pain and ear fullness on both sides. She was diagnosed with TMJ at that time. She did go to  and is wearing a bite guard and is doing much better. She still feels some pressure/water feeling in her left ear. That is the side with the neck tightness. Denies fevers, unintentional weight loss, odynophagia, dysphagia, hemoptysis, voice changes, and shortness of breath.      Past medical history, surgical history, social history, family history, medications, and allergies have been reviewed with the patient and are documented above.     Review of Systems: a 10-system review was performed. Pertinent positives are noted in the HPI and on a separate scanned document in the chart.     PHYSICAL EXAMINATION:  GEN: no acute distress, normocephalic  EYES: extraocular movements are intact, pupils are equal and round. Sclera clear.   EARS: auricles are normally formed. The external auditory canals are clear with minimal to no cerumen. Tympanic membranes are intact bilaterally with no signs of infection, effusion, retractions, or perforations. There are a few spots of very thin TM on the left, but intactness was confirmed under binocular microscopy.  NOSE: anterior nares are patent.   OC/OP: clear, dentition is in good repair but with wear patterns suggestive of clenching. The tongue and palate are fully mobile and symmetric. No masses or lesions.  NECK: soft and supple. No concerning lymphadenopathy or masses. Airway is midline. Bilateral TMJ tenderness with continued L>R tightness of the SCM  NEURO: CN VII and XII symmetric. alert and oriented. No spontaneous nystagmus. Gait is normal.  PULM: breathing comfortably on room air, normal chest expansion with respiration    MEDICAL DECISION-MAKING:  This patient is a 25yo F with continued ear discomfort/neck muscle tightness from TMD. Reassurance given and advised that she continue to do her PT and wear her dental guard.

## 2023-03-28 ENCOUNTER — LAB (OUTPATIENT)
Dept: LAB | Facility: CLINIC | Age: 28
End: 2023-03-28
Payer: COMMERCIAL

## 2023-03-28 DIAGNOSIS — Z31.69 ENCOUNTER FOR PRECONCEPTION CONSULTATION: ICD-10-CM

## 2023-03-28 DIAGNOSIS — L65.9 HAIR THINNING: ICD-10-CM

## 2023-03-28 PROCEDURE — 84443 ASSAY THYROID STIM HORMONE: CPT

## 2023-03-28 PROCEDURE — 36415 COLL VENOUS BLD VENIPUNCTURE: CPT

## 2023-03-29 LAB — TSH SERPL DL<=0.005 MIU/L-ACNC: 1.38 UIU/ML (ref 0.3–4.2)

## 2023-05-17 ENCOUNTER — OFFICE VISIT (OUTPATIENT)
Dept: OBGYN | Facility: CLINIC | Age: 28
End: 2023-05-17
Payer: COMMERCIAL

## 2023-05-17 VITALS
BODY MASS INDEX: 22.59 KG/M2 | WEIGHT: 135.6 LBS | SYSTOLIC BLOOD PRESSURE: 100 MMHG | HEIGHT: 65 IN | DIASTOLIC BLOOD PRESSURE: 60 MMHG

## 2023-05-17 DIAGNOSIS — Z31.41 FERTILITY TESTING: Primary | ICD-10-CM

## 2023-05-17 PROCEDURE — 99214 OFFICE O/P EST MOD 30 MIN: CPT | Performed by: OBSTETRICS & GYNECOLOGY

## 2023-05-17 NOTE — PATIENT INSTRUCTIONS
Normal Fertility:    - In the first six months of attempting pregnancy, approximately 80 percent of couples will conceive; in the first 12 months, approximately 85 percent will conceive.    - The highest probability of conception occurs when intercourse takes place one to two days prior to ovulation and on the day of ovulation   - Timing of ovulation depends on your cycle length and the regularity of your cycle. The average cycle length is 28 days from the first day of 1 period to the first day of the next period. If your cycles are 28 days long you will likely ovulate on day 14 of your cycle. I usually recommend that couples have intercourse every other day from day 10 to day 20 of your cycle.     Women can attempt to predict the time of ovulation by tracking changes in cervical mucus (highest probability of conception is on the day of peak production of slippery clear mucus) or by using a kit to measure urinary luteinizing hormone (LH). Use of a home ovulation test kit may decrease the time to conception, particularly in women with irregular cycles or couples who have sexual intercourse infrequently.     Dr. Daniel Haddad  [facility] for semen analysis and evaluation  Address: [address] [place] [address]  Phone: [phone]     Infertility  Your evaluation for infertility involves tests for you and for your partner. These tests may include blood work timed for certain days of the menstrual cycle, a special x-ray to evaluate whether the fallopian tubes are open (HSG, or hysterosalpingogram), and possibly an ultrasound. Your partner may need to have a semen analysis done to check for normal numbers of sperm and whether the sperm have a normal shape and normal abilities to move. Not all patients will need all of these tests done, especially if you have had testing elsewhere before your visit to our clinic.    Call your clinic on the first business day after your period starts to schedule labs for  the third or fourth day of your cycle (FSH, estradiol.) You may also need to schedule an HSG at this time, which will be done in the radiology department. You will need antibiotics if you are having an HSG, so please make sure you tell the nurse what pharmacy you use if you have not already received a prescription for those.    You may need other labs done on the 21st day of your cycle (or the closest business day to that date.) This is a progesterone test to document whether you are ovulating.    Tests for thyroid function and other hormone levels do not have to be done at a particular time and may be scheduled with either of the above timed tests.    Your partner should get in touch with his primary care provider to arrange a semen analysis. Please be sure that those results are forwarded to your OBGYN provider so that a notation may be made in your record as well.    You will usually have a follow up appointment after all tests are completed, to review your results and to plan what steps should be taken next. If at any point in the process you have questions or concerns, please contact your clinic.    [facility]--[phone]       Getting Ready for a Hysterosalpingogram (HSG)  What is this test?  HSG is an X-ray exam of the uterus and fallopian tubes. The exam is done by a gynecologist (often an OBGYN) or radiologist (X-ray doctor).  We will inject dye (contrast fluid) into your uterus. We will then take X-ray pictures. The dye helps your organs show up better on the X-rays.  How do I get ready for my exam?  Schedule this exam on days 5-12 of your cycle (day 1 is the first day of bleeding).  Do not have sex (intercourse) from the start of your period until you come in for the exam. If you have had sex, we will need to reschedule the exam.  An hour before the exam, you may take 600 mg (milligrams) of ibuprofen (Advil, Motrin), if you wish. This may relieve cramping during the exam.  You will be given a  prescription for antibiotics to take the day before, the day of, and the day after your procedure. If you have allergies, please let us know.  Please bring a list of your current medicines to your exam. (Include vitamins, minerals and over-the-counter medicines.) Leave your valuables at home.  Tell your doctor if there is any chance you are pregnant.  What happens during the exam?  The entire exam takes about an hour. You will wear a hospital gown. We will ask you to empty your bladder before the exam.  You will lie flat on your back with your legs apart, as if you were having a pelvic exam.  We will pass a small, thin tube through your vagina and into your uterus (womb).  We will inject the contrast dye through this tube. As the dye fills your fallopian tubes, we will take X-ray pictures.  You may have slight cramping during this exam. The cramps should go away afterward.   You may have some spotting after the exam.  Is it safe?  There is a small risk of allergic reaction to the dye used during the exam. You may discuss any concerns you have with the doctor.  When will I know the results?   Your doctor will give you the results at your next visit, or you will see the results on [employer]. If you are not on [employer], you will receive a call from one of our clinic staff.   You or your insurer will then receive two bills: one from the hospital and one from the doctor who did the exam.  Who should I call with questions?  Please call your Diagnostic Imaging Department.    For informational purposes only. Not to replace the advice of your health care provider. Copyright   2011 [employer]. All rights reserved. [employer] 084716 - REV 08/17.

## 2023-05-17 NOTE — NURSING NOTE
"Chief Complaint   Patient presents with     Consult     Fertility       Initial /60   Ht 1.638 m (5' 4.5\")   Wt 61.5 kg (135 lb 9.6 oz)   LMP 2023 (Exact Date)   BMI 22.92 kg/m   Estimated body mass index is 22.92 kg/m  as calculated from the following:    Height as of this encounter: 1.638 m (5' 4.5\").    Weight as of this encounter: 61.5 kg (135 lb 9.6 oz).  BP completed using cuff size: regular    Questioned patient about current smoking habits.  Pt. has never smoked.          The following HM Due: NONE         "

## 2023-05-17 NOTE — PROGRESS NOTES
"SUBJECTIVE:   CC: fertility                                               Alicia Reyes is a 27 year old  female who presents to clinic today for fertility consultation. Cycles are 29-31 days, typically ovulating on day 15 or 16 based on OPKs. Periods are relatively short, 3 days max + some spotting, changing tampons every 4 hours. +nipple sensitivity, bloating, and mild cramps. Hx irregular periods but much more regular in the last 3 years.   She did have a history of termination through D&C in 2019. No contraception since then.    Reviewed outside hormone labs including normal TSH, E2, FSH, prolactin. AMH 9.     Problem list and histories reviewed & adjusted, as indicated.  Additional history: as documented.       PRENATAL VIT-FE BISG-FA-DHA PO,     No current facility-administered medications on file prior to visit.    No Known Allergies    OBJECTIVE:   /60   Ht 1.638 m (5' 4.5\")   Wt 61.5 kg (135 lb 9.6 oz)   LMP 2023 (Exact Date)   BMI 22.92 kg/m     Const: sitting in chair in no acute distress, comfortable  Pulm: no increased work of breathing, no cough  Psych: mood stable, appropriate affect  Neuro: A+Ox3     ASSESSMENT/PLAN:                                                    Alicia Reyes is a 27 year old female  here for initial fertility counseling. She has been trying to conceive for 7 months, tracking ovulation and engaging in timed intercourse in that time. No semen analysis yet for her partner. She has conceived 1 pregnancy spontaneously which was terminated via D&C. She wonders about Asherman's syndrome risks.   She was advised that infertility testing usually occurs after 12 months of unsuccessful timed intercourse, however, she is interested in pursuing the below tests at this time which is reasonable.     We discussed basics of conception, including regular ovulation and normal ovarian reserve, normal sperm count with normal motility and morphology, and no blockages " in the uterus or fallopian tubes. We discussed the ways to evaluate each of these components of basic fertility. She was given written information about HSG, timing of progesterone level, and arranging semen analysis for her partner. We also discussed hysterosalpingogram. She has already performed day 3 labs several times which were all normal. Her AMH was on the higher side. Historical US reviewed and shows multiple follicles, however she is now ovulating regularly therefore my suspicion for PCOS at this point is low. She may choose to schedule a hysterosalpingogram in this next cycle or she may decide to wait until all hormone tests are available. I will see her back after all tests are completed to discuss the follow-up plan.    1. Fertility testing  - XR Hysterosalpingogram; Future  - Progesterone; Future   - patient's  will obtain semen analysis through his PCP    Return to clinic as needed for further testing.     3 minutes spent by me on the date of the encounter doing chart review, review of outside records, patient visit and documentation      Yaz Arias MD  Obstetrics and Gynecology   [facility] [place]

## 2023-06-07 ENCOUNTER — LAB (OUTPATIENT)
Dept: LAB | Facility: CLINIC | Age: 28
End: 2023-06-07
Payer: COMMERCIAL

## 2023-06-07 DIAGNOSIS — Z31.41 FERTILITY TESTING: ICD-10-CM

## 2023-06-07 LAB — PROGEST SERPL-MCNC: 14 NG/ML

## 2023-06-07 PROCEDURE — 36415 COLL VENOUS BLD VENIPUNCTURE: CPT

## 2023-06-07 PROCEDURE — 84144 ASSAY OF PROGESTERONE: CPT

## 2023-06-23 ENCOUNTER — HOSPITAL ENCOUNTER (OUTPATIENT)
Dept: GENERAL RADIOLOGY | Facility: CLINIC | Age: 28
Discharge: HOME | End: 2023-06-23
Attending: OBSTETRICS & GYNECOLOGY | Admitting: OBSTETRICS & GYNECOLOGY
Payer: COMMERCIAL

## 2023-06-23 DIAGNOSIS — Z31.41 FERTILITY TESTING: ICD-10-CM

## 2023-06-23 PROCEDURE — 74740 X-RAY FEMALE GENITAL TRACT: CPT

## 2023-09-18 ENCOUNTER — PATIENT OUTREACH (OUTPATIENT)
Dept: CARE COORDINATION | Facility: CLINIC | Age: 28
End: 2023-09-18
Payer: COMMERCIAL

## 2023-10-02 ENCOUNTER — PATIENT OUTREACH (OUTPATIENT)
Dept: CARE COORDINATION | Facility: CLINIC | Age: 28
End: 2023-10-02
Payer: COMMERCIAL

## 2023-10-17 ENCOUNTER — OFFICE VISIT (OUTPATIENT)
Dept: OBGYN | Facility: CLINIC | Age: 28
End: 2023-10-17
Payer: COMMERCIAL

## 2023-10-17 VITALS
DIASTOLIC BLOOD PRESSURE: 82 MMHG | WEIGHT: 141.9 LBS | BODY MASS INDEX: 23.64 KG/M2 | SYSTOLIC BLOOD PRESSURE: 121 MMHG | HEIGHT: 65 IN

## 2023-10-17 DIAGNOSIS — Z23 NEED FOR PROPHYLACTIC VACCINATION AND INOCULATION AGAINST INFLUENZA: Primary | ICD-10-CM

## 2023-10-17 PROCEDURE — 90471 IMMUNIZATION ADMIN: CPT | Performed by: OBSTETRICS & GYNECOLOGY

## 2023-10-17 PROCEDURE — 90686 IIV4 VACC NO PRSV 0.5 ML IM: CPT | Performed by: OBSTETRICS & GYNECOLOGY

## 2023-10-17 PROCEDURE — 99395 PREV VISIT EST AGE 18-39: CPT | Mod: 25 | Performed by: OBSTETRICS & GYNECOLOGY

## 2023-10-17 RX ORDER — MULTIVITAMIN WITH IRON
1 TABLET ORAL DAILY
COMMUNITY

## 2023-10-17 RX ORDER — VITAMIN B COMPLEX
TABLET ORAL DAILY
COMMUNITY

## 2023-10-17 NOTE — PATIENT INSTRUCTIONS
If you have labs or imaging done, the results will automatically release in [employer] without an interpretation.  Your health care professional will review those results and send an interpretation with recommendations as soon as possible, but this may be 1-3 business days.    If you have any questions regarding your visit, please contact your care team.     [employer] Access Services: [phone]  [facility] CLINIC HOURS TELEPHONE NUMBER     Vira DO Andree Weston - Certified Medical Assistant    Georgie Galeana -   [place] -     Monday- [place]  8:00 a.m - 5:00 p.m    Tuesday- [place]  7:00 a.m - 5:00 p.m    Friday- [place]  9:00 a.m - 5:00 p.m.    Typical Surgery Day: Thursday [facility]  [address]  [place] [address]  Appointment [phone]  Fax: [phone]   Imaging Scheduling- [phone]    [facility]  [address] [place] [address] [place] [address]  [phone]    [address] [address]  Phone: [phone]  Fax: [phone]   Imaging Scheduling- [phone]       **Surgeries** Our Surgery Schedulers will contact you to schedule. If you do not receive a call within 3 business days, please call [phone].    Urgent Care locations:  [facility] Monday-Friday  10 am - 8 pm  Saturday and    9 am - 5 pm  Monday-Friday   10 am - 8 pm  Saturday and    9 am - 5 pm   [phone]     If you need a medication refill, please contact your pharmacy. Please allow 3 business days for your refill to be completed.  As always, Thank you for trusting us with your healthcare needs!    see additional instructions from your care team below

## 2023-10-17 NOTE — PROGRESS NOTES
Chief Complaint   Patient presents with    Physical       Subjective  Alicia Reyes is a 28 year old female who presents for her annual exam.  Her menses are regular, monthly.  She is not on anything for birth control.  She bleeds for 2-3 days.  Patient uses both pads and tampons.  No big clots.  No dizziness.  Her last menstrual period was 10/12/2023.  No problems urinating.  Normal bowel movements.  Patient is sexually active.  Some dyspareunia-positional.  This has been going on for the last 2 years.  No children.  1 pregnancy.  Actively trying for a pregnancy.  Patient desires the flu vaccine.      Most recent pap:  2022  History of abnormal Pap smear:  No    Family history of uterine cancer:  No  Family history of ovarian cancer:  No  Family history of colon cancer:   No  Family history of breast cancer:  No    ROS  ROS: 10 point ROS neg other than the symptoms noted above in the HPI.    Past Medical History:   Diagnosis Date    Anxiety 2021    Swelling of lymph nodes     bilat neck     Past Surgical History:   Procedure Laterality Date    COLONOSCOPY  07/14/2022    WISDOM TOOTH EXTRACTION       Family History   Problem Relation Age of Onset    LUNG DISEASE Mother     Osteoporosis Mother     Heart Disease Father     GI problems Sister     Allergy (Severe) Sister     Lung Cancer Maternal Grandmother     Cervical Cancer Maternal Grandfather      Social History     Tobacco Use    Smoking status: Never    Smokeless tobacco: Never   Substance Use Topics    Alcohol use: Yes     Alcohol/week: 1.0 standard drink of alcohol     Types: 1 Standard drinks or equivalent per week     Comment: Alcoholic Drinks/day: Social        Tobacco abuse:  No  Do you get at least three servings of calcium containing foods daily (dairy, green leafy vegetables, etc.)? yes   Outside of work or daily activities, how many days per week do you exercise for 30 minutes or longer? 3-4x per week  The patient does not drink >3 drinks per day nor  ">7 drinks per week.   Have you had an eye exam in the past two years? yes   Do you see a dentist twice per year? yes   Today's PHQ-2 Score:   Abuse: Current or Past(Physical, Sexual or Emotional)- No   Do you feel safe in your environment - Yes  Objective  Vitals: /82 (BP Location: Right arm, Cuff Size: Adult Regular)   Ht 1.638 m (5' 4.5\")   Wt 64.4 kg (141 lb 14.4 oz)   LMP 10/12/2023   BMI 23.98 kg/m    BMI= Body mass index is 23.98 kg/m .    General appearance=well developed, well-nourished female  Gait=normal  Psych=mood is stable, alert and oriented x3  HEENT=mucous membranes moist  Skin=no rashes or lesions seen,normal turgor   Breast:  Benign exam.  No masses noted.  Non tender. No skin changes, retraction, or nipple discharge.  Axilla feel completely normal, no lymph node enlargement and non-tender.  Neck=overall appearance is normal  Heart=RRR, no murmurs, no swelling noted  Thyroid=normal, no masses, no TTP, no enlargement  Lungs=non-labored breathing, no use of accessory muscles, clear to ausculation bilaterally  Abd=soft, Nontender/nondistended, +bowel sounds x4, no masses, no signs of hernias, no evidence of hepatosplenomegaly  PELVIC:    External genitalia: normal without lesions or masses  Urethral meatus: no lesions or prolapse noted, normal size  Urethra: no masses, non tender  Bladder: non tender, no fullness  Vagina: normal mucosa and rugae, no discharge.  Cervix: normal without lesion, no cervical motion tenderness, healthy, nulliparous  Uterus: small, mobile, nontender.  Adnexa: non tender, without masses  Rectal: deferred  Ext=no clubbing or cyanosis, no swelling       Last lipid profile:  2021  Regular self breast exam:   Yes  Most recent mammogram:  N/A  History of abnormal mammogram: Breast ultrasound=2020.  Benign  Fit testing:  N/A  DEXA:  N/A        Assessment/Plan  1.)  Annual/well woman=flu vaccine      The following topics were discussed or recommended   Discussed seat belt, " helmet and sunscreen use  Vision screening   Calcium/Vitamin D supplement=Recommended 1000 mg of calcium daily and 800 IU of vitamin D.    30 minutes were spent on the date of the encounter doing chart review, history and exam, documentation, and further activities as noted above.        Vira Weston DO

## 2023-10-23 ENCOUNTER — MYC MEDICAL ADVICE (OUTPATIENT)
Dept: OBGYN | Facility: CLINIC | Age: 28
End: 2023-10-23
Payer: COMMERCIAL

## 2023-10-23 DIAGNOSIS — N46.11 INFERTILITY DUE TO OLIGOSPERMIA: Primary | ICD-10-CM

## 2023-10-23 NOTE — TELEPHONE ENCOUNTER
"Pt just had a yearly physical with Dr. Weston on 10/17/23.  Ttc was expressed by pt during this appt.    Pt is writing in stating her and her  just received his SA results back and it shows his \"normal sperm morphology was 0% and some counts were low\".  Pt's  is reaching out to his FP provider for next steps for him.    Pt is wondering what next steps for her are or any recommendations for couples in this situation.  Should she be seen by an TY specialist.    Routing to Dr. Weston.    Xiao Llanos, SHAINA    "

## 2023-10-24 NOTE — TELEPHONE ENCOUNTER
I recommend patient's  follow up with his FP or Urology.  After that, they will likely need to see TY.  I placed that referral for her.    Vira Weston, DO

## 2023-11-22 ENCOUNTER — IMMUNIZATION (OUTPATIENT)
Dept: FAMILY MEDICINE | Facility: CLINIC | Age: 28
End: 2023-11-22
Payer: COMMERCIAL

## 2023-11-22 DIAGNOSIS — Z23 HIGH PRIORITY FOR 2019-NCOV VACCINE: Primary | ICD-10-CM

## 2023-11-22 PROCEDURE — 91320 SARSCV2 VAC 30MCG TRS-SUC IM: CPT

## 2023-11-22 PROCEDURE — 90480 ADMN SARSCOV2 VAC 1/ONLY CMP: CPT

## 2023-11-22 PROCEDURE — 99207 PR NO CHARGE NURSE ONLY: CPT

## 2024-04-04 ENCOUNTER — VIRTUAL VISIT (OUTPATIENT)
Dept: OBGYN | Facility: CLINIC | Age: 29
End: 2024-04-04
Payer: COMMERCIAL

## 2024-04-04 VITALS — HEIGHT: 65 IN | BODY MASS INDEX: 23.66 KG/M2 | WEIGHT: 142 LBS

## 2024-04-04 DIAGNOSIS — Z34.80 PRENATAL CARE, SUBSEQUENT PREGNANCY, UNSPECIFIED TRIMESTER: Primary | ICD-10-CM

## 2024-04-04 PROCEDURE — 99207 PR NO CHARGE NURSE ONLY: CPT | Mod: 93

## 2024-04-04 NOTE — PATIENT INSTRUCTIONS
Learning About Pregnancy  Your Care Instructions     Your health in the early weeks of your pregnancy is particularly important for your baby's health. Take good care of yourself. Anything you do that harms your body can also harm your baby.  Make sure to go to all of your doctor appointments. Regular checkups will help keep you and your baby healthy.  How can you care for yourself at home?  Diet    Choose healthy foods like fruits, vegetables, whole grains, lean proteins, and healthy fats.     Choose foods that are good sources of calcium, iron, and folate. You can try dairy products, dark leafy greens, fortified orange juice and cereals, almonds, broccoli, dried fruit, and beans.     Do not skip meals or go for many hours without eating. If you are nauseated, try to eat a small, healthy snack every 2 to 3 hours.     Avoid fish that are high in mercury. These include shark, swordfish, tahir mackerel, marlin, orange roughy, and bigeye tuna, as well as tilefish from the [facility].     It's okay to eat up to 8 to 12 ounces a week of fish that are low in mercury or up to 4 ounces a week of fish that have medium levels of mercury. Some fish that are low in mercury are salmon, shrimp, canned light tuna, cod, and tilapia. Some fish that have medium levels of mercury are halibut and white albacore tuna.     Drink plenty of fluids. If you have kidney, heart, or liver disease and have to limit fluids, talk with your doctor before you increase the amount of fluids you drink.     Limit caffeine to about 200 to 300 mg per day. On average, a cup of brewed coffee has around 80 to 100 mg of caffeine.     Do not drink alcohol, such as beer, wine, or hard liquor.     Take a multivitamin that contains at least 400 micrograms (mcg) of folic acid to help prevent birth defects. Fortified cereal and whole wheat bread are good additional sources of folic acid.     Increase the calcium in your diet. Try to drink a quart of skim milk  each day. You may also take calcium supplements and choose foods such as cheese and yogurt.   Lifestyle    Make sure you go to your follow-up appointments.     Get plenty of rest. You may be unusually tired while you are pregnant.     Get at least 30 minutes of exercise on most days of the week. Walking is a good choice. If you have not exercised in the past, start out slowly. Take several short walks each day.     Do not smoke. If you need help quitting, talk to your doctor about stop-smoking programs. These can increase your chances of quitting for good.     Do not touch cat feces or litter boxes. Also, wash your hands after you handle raw meat, and fully cook all meat before you eat it. Wear gloves when you work in the yard or garden, and wash your hands well when you are done. Cat feces, raw or undercooked meat, and contaminated dirt can cause an infection that may harm your baby or lead to a miscarriage.     Avoid things that can make your body too hot and may be harmful to your baby, such as a hot tub or sauna. Or talk with your doctor before doing anything that raises your body temperature. Your doctor can tell you if it's safe.     Avoid chemical fumes, paint fumes, or poisons.     Do not use illegal drugs, marijuana, or alcohol.   Medicines    Review all of your medicines with your doctor. Some of your routine medicines may need to be changed to protect your baby.     Use acetaminophen (Tylenol) to relieve minor problems, such as a mild headache or backache or a mild fever with cold symptoms. Do not use nonsteroidal anti-inflammatory drugs (NSAIDs), such as ibuprofen (Advil, Motrin) or naproxen (Aleve), unless your doctor says it is okay.     Do not take two or more pain medicines at the same time unless the doctor told you to. Many pain medicines have acetaminophen, which is Tylenol. Too much acetaminophen (Tylenol) can be harmful.     Take your medicines exactly as prescribed. Call your doctor if you  "think you are having a problem with your medicine.   To manage morning sickness    Keep food in your stomach, but not too much at once. Try eating five or six small meals a day instead of three large meals.     For nausea when you wake up, eat a small snack, such as a couple of crackers or pretzels, before rising. Allow a few minutes for your stomach to settle before you slowly get up.     Try to avoid smells and foods that make you feel nauseated. High-fat or greasy foods, milk, and coffee may make nausea worse. Some foods that may be easier to tolerate include cold, spicy, sour, and salty foods.     Drink enough fluids. Water and other caffeine-free drinks are good choices.     Take your prenatal vitamins at night on a full stomach.     Try foods and drinks made with celina. Celina may help with nausea.     Get lots of rest. Morning sickness may be worse when you are tired.     Talk to your doctor about over-the-counter products, such as vitamin B6 or doxylamine, to help relieve symptoms.     Try a P6 acupressure wrist band. These anti-nausea wristbands help some people.   Follow-up care is a key part of your treatment and safety. Be sure to make and go to all appointments, and call your doctor if you are having problems. It's also a good idea to know your test results and keep a list of the medicines you take.  Where can you learn more?  Go to https://www.MyLuvs.net/patiented  Enter E868 in the search box to learn more about \"Learning About Pregnancy.\"  Current as of: July 10, 2023               Content Version: 14.0    0126-1819 [employer].   Care instructions adapted under license by your healthcare professional. If you have questions about a medical condition or this instruction, always ask your healthcare professional. [employer] disclaims any warranty or liability for your use of this information.      Weeks 6 to 10 of Your Pregnancy: Care Instructions  During these weeks of " "pregnancy, your body goes through many changes. You may start to feel different, both in your body and your emotions. Each pregnancy is different, so there's no \"right\" way to feel. These early weeks are a time to make healthy choices for you and your pregnancy.    Take a daily prenatal vitamin. Choose one with folic acid in it.    Avoid alcohol, tobacco, and drugs (including marijuana). If you need help quitting, talk to your doctor.    Drink plenty of liquids.  Be sure to drink enough water. And limit sodas, other sweetened drinks, and caffeine.     Choose foods that are good sources of calcium, iron, and folate.  You can try dairy products, dark leafy greens, fortified orange juice and cereals, almonds, broccoli, dried fruit, and beans.     Avoid foods that may be harmful.  Don't eat raw meat, deli meat, raw seafood, or raw eggs. Avoid soft cheese and unpasteurized dairy, like Brie and blue cheese. And don't eat fish that contains a lot of mercury, like shark and swordfish.     Don't touch peter litter or cat poop.  They can cause an infection that could be harmful during pregnancy.     Avoid things that can make your body too hot.  For example, avoid hot tubs and saunas.     Soothe morning sickness.  Try eating 5 or 6 small meals a day, getting some fresh air, or using lily to control symptoms.     Ask your doctor about flu and COVID-19 shots.  Getting them can help protect against infection.   Follow-up care is a key part of your treatment and safety. Be sure to make and go to all appointments, and call your doctor if you are having problems. It's also a good idea to know your test results and keep a list of the medicines you take.  Where can you learn more?  Go to https://www.[place]to.net/patiented  Enter G112 in the search box to learn more about \"Weeks 6 to 10 of Your Pregnancy: Care Instructions.\"  Current as of: July 10, 2023               Content Version: 14.0    6957-9871 [employer]. "   Care instructions adapted under license by your healthcare professional. If you have questions about a medical condition or this instruction, always ask your healthcare professional. [employer] disclaims any warranty or liability for your use of this information.         Managing Morning Sickness (01:55)  Your health professional recommends that you watch this short online health video.  Learn tips for dealing with morning sickness, no matter what time of day you have it.  Purpose:  Gives tips for managing morning sickness, including eating small low-fat meals and avoiding caffeine and spicy food.  Goal:  The user will learn tips for dealing with morning sickness during pregnancy.     How to watch the video    Scan the QR code   OR Visit the website    [email or web address]   Current as of: July 10, 2023               Content Version: 14.0    0769-9287 [employer].   Care instructions adapted under license by your healthcare professional. If you have questions about a medical condition or this instruction, always ask your healthcare professional. [employer] disclaims any warranty or liability for your use of this information.      Pregnancy and Heartburn: Care Instructions  Overview     Heartburn is a common problem during pregnancy.  Heartburn happens when stomach acid backs up into the tube that carries food to the stomach. This tube is called the esophagus. Early in pregnancy, heartburn is caused by hormone changes that slow down digestion. Later on, it's also caused by the large uterus pushing up on the stomach.  Even though you can't fix the cause, there are things you can do to get relief. Treating heartburn during pregnancy focuses first on making lifestyle changes, like changing what and how you eat, and on taking medicines.  Heartburn usually improves or goes away after childbirth.  Follow-up care is a key part of your treatment and safety. Be sure to make  "and go to all appointments, and call your doctor if you are having problems. It's also a good idea to know your test results and keep a list of the medicines you take.  How can you care for yourself at home?  Eat small, frequent meals.  Avoid foods that make your symptoms worse, such as chocolate, peppermint, and spicy foods. Avoid drinks with caffeine, such as coffee, tea, and sodas.  Avoid bending over or lying down after meals.  Take a short walk after you eat.  If heartburn is a problem at night, do not eat for 2 hours before bedtime.  Take antacids like Mylanta, Maalox, Rolaids, or Tums. Do not take antacids that have sodium bicarbonate, magnesium trisilicate, or aspirin. Be careful when you take over-the-counter antacid medicines. Many of these medicines have aspirin in them. While you are pregnant, do not take aspirin or medicines that contain aspirin unless your doctor says it is okay.  If you're not getting relief, talk to your doctor. You may be able to take a stronger acid-reducing medicine.  When should you call for help?   Call your doctor now or seek immediate medical care if:    You have new or worse belly pain.     You are vomiting.   Watch closely for changes in your health, and be sure to contact your doctor if:    You have new or worse symptoms of reflux.     You are losing weight.     You have trouble or pain swallowing.     You do not get better as expected.   Where can you learn more?  Go to [email or web address] [email or web address] [email or web address]  Enter U946 in the search box to learn more about \"Pregnancy and Heartburn: Care Instructions.\"  Current as of: July 10, 2023               Content Version: 14.0    6919-1755 [employer].   Care instructions adapted under license by your healthcare professional. If you have questions about a medical condition or this instruction, always ask your healthcare professional. [employer] disclaims any warranty or liability for your use of this " information.      Constipation: Care Instructions  Overview     Constipation means that you have a hard time passing stools (bowel movements). People pass stools from 3 times a day to once every 3 days. What is normal for you may be different. Constipation may occur with pain in the rectum and cramping. The pain may get worse when you try to pass stools. Sometimes there are small amounts of bright red blood on toilet paper or the surface of stools. This is because of enlarged veins near the rectum (hemorrhoids).  A few changes in your diet and lifestyle may help you avoid ongoing constipation. Your doctor may also prescribe medicine to help loosen your stool.  Some medicines can cause constipation. These include pain medicines and antidepressants. Tell your doctor about all the medicines you take. Your doctor may want to make a medicine change to ease your symptoms.  Follow-up care is a key part of your treatment and safety. Be sure to make and go to all appointments, and call your doctor if you are having problems. It's also a good idea to know your test results and keep a list of the medicines you take.  How can you care for yourself at home?  Drink plenty of fluids. If you have kidney, heart, or liver disease and have to limit fluids, talk with your doctor before you increase the amount of fluids you drink.  Include high-fiber foods in your diet each day. These include fruits, vegetables, beans, and whole grains.  Get at least 30 minutes of exercise on most days of the week. Walking is a good choice. You also may want to do other activities, such as running, swimming, cycling, or playing tennis or team sports.  Take a fiber supplement, such as Citrucel or Metamucil, every day. Read and follow all instructions on the label.  Schedule time each day for a bowel movement. A daily routine may help. Take your time having a bowel movement, but don't sit for more than 10 minutes at a time. And don't strain too  "much.  Support your feet with a small step stool when you sit on the toilet. This helps flex your hips and places your pelvis in a squatting position.  Your doctor may recommend an over-the-counter laxative to relieve your constipation. Examples are Milk of Magnesia and MiraLax. Read and follow all instructions on the label. Do not use laxatives on a long-term basis.  When should you call for help?   Call your doctor now or seek immediate medical care if:    You have new or worse belly pain.     You have new or worse nausea or vomiting.     You have blood in your stools.   Watch closely for changes in your health, and be sure to contact your doctor if:    Your constipation is getting worse.     You do not get better as expected.   Where can you learn more?  Go to https://[email or web address].net/patiented  Enter P343 in the search box to learn more about \"Constipation: Care Instructions.\"  Current as of: October 19, 2023               Content Version: 14.0    2279-5199 [employer].   Care instructions adapted under license by your healthcare professional. If you have questions about a medical condition or this instruction, always ask your healthcare professional. [employer] disclaims any warranty or liability for your use of this information.      Learning About High-Iron Foods  What foods are high in iron?     The foods you eat contain nutrients, such as vitamins and minerals. Iron is a nutrient. Your body needs the right amount to stay healthy and work as it should. You can use the list below to help you make choices about which foods to eat.  Here are some foods that contain iron. They have 1 to 2 milligrams of iron per serving.  Fruits  Figs (dried), 5 figs  Vegetables  Asparagus (canned), 6 rutherford  Mely, beet, Swiss chard, or turnip greens, 1 cup  Dried peas, cooked,   cup  Seaweed, spirulina (dried),   cup  Spinach, (cooked)   cup or (raw) 1 cup  Grains  Cereals, fortified with iron, 1 " "cup  Grits (instant, cooked), fortified with iron,   cup  Meats and other protein foods  Beans (kidney, lima, navy, white), canned or cooked,   cup  Beef or lamb, 3 oz  Chicken giblets, 3 oz  Chickpeas (garbanzo beans),   cup  Liver of beef, lamb, or pork, 3 oz  Oysters (cooked), 3 oz  Sardines (canned), 3 oz  Soybeans (boiled),   cup  Tofu (firm),   cup  Work with your doctor to find out how much of this nutrient you need. Depending on your health, you may need more or less of it in your diet.  Where can you learn more?  Go to https://www.Kashmi.net/patiented  Enter R005 in the search box to learn more about \"Learning About High-Iron Foods.\"  Current as of: September 20, 2023               Content Version: 14.0    [id number] [employer].   Care instructions adapted under license by your healthcare professional. If you have questions about a medical condition or this instruction, always ask your healthcare professional. [employer] disclaims any warranty or liability for your use of this information.      Rh Antibodies Screening During Pregnancy: About This Test  What is it?     The Rh antibodies screening test is a blood test. It checks your blood for Rh antibodies. If you have Rh-negative blood and have been exposed to Rh-positive blood, your immune system may make antibodies to attack the Rh-positive blood. When a pregnant woman has these antibodies, it is called Rh sensitization.  Why is this test done?  The Rh antibodies screening test is done during pregnancy to find out if your baby is at risk for Rh disease. This can happen if you have Rh-negative blood and your baby has Rh-positive blood. If your Rh-negative blood mixes with Rh-positive blood, your immune system will make antibodies to attack the Rh-positive blood.  During pregnancy, these antibodies could attach to the baby's red blood cells. This can cause your baby to have serious health problems. The results of this test " "will help your doctor know how to best care for you and your baby during your pregnancy.  How do you prepare for the test?  In general, there's nothing you have to do before this test, unless your doctor tells you to.  How is the test done?  A health professional uses a needle to take a blood sample, usually from the arm.  What happens after the test?  You will probably be able to go home right away. It depends on the reason for the test.  You can go back to your usual activities right away.  Follow-up care is a key part of your treatment and safety. Be sure to make and go to all appointments, and call your doctor if you are having problems. It's also a good idea to keep a list of the medicines you take. Ask your doctor when you can expect to have your test results.  Where can you learn more?  Go to https://[email or web address] [email or web address]/[email or web address]  Enter P722 in the search box to learn more about \"Rh Antibodies Screening During Pregnancy: About This Test.\"  Current as of: July 10, 2023               Content Version: 14.0    7083-9773 [employer].   Care instructions adapted under license by your healthcare professional. If you have questions about a medical condition or this instruction, always ask your healthcare professional. [employer] disclaims any warranty or liability for your use of this information.      Learning About Preventing Rh Disease  What is Rh disease?     Rh disease can be a serious problem in pregnancy. It happens when substances called antibodies in the mother's blood cause red blood cells in her baby's blood to be destroyed. This can occur when the blood types of a mother and her baby do not match.  All blood has an Rh factor. This is what makes a blood type positive or negative. When you are Rh-negative, your baby may be Rh-negative or Rh-positive. If your baby has Rh-positive blood and it mixes with yours, your body will make antibodies. This is called Rh sensitization.  Most of " "the time, this is not a problem in a first pregnancy. But in future pregnancies, it could cause Rh disease.  A  with Rh disease has mild anemia and may have jaundice. In severe cases, anemia, jaundice, and swelling can be very dangerous or fatal. Some babies need to be delivered early. Some need special care in the NICU. A very sick baby will need a blood transfusion before or after birth.  Fortunately, Rh sensitization is usually easy to prevent.  That's why it's important to get your Rh status checked in your first trimester. It doesn't cause any warning signs. A blood test is the only way to know if you are Rh-sensitive or are at risk for it.  How can you prevent Rh disease?  If you are Rh-negative, your doctor gives you an Rh immune globulin shot (such as RhoGAM). It helps prevent your body from making the antibodies that attack your baby's red blood cells.  Timing is important. You need the shot at certain times during your pregnancy. And you need one anytime there is a chance that your baby's blood might mix with yours. That can happen with certain prenatal tests or when you have pregnancy bleeding, such as:  Right after any pregnancy loss, amniocentesis, or CVS testing.  After turning of a breech baby.  Before and maybe after childbirth. Your doctor gives you a shot around week 28. If your  is Rh-positive, you will have another shot.  Follow-up care is a key part of your treatment and safety. Be sure to make and go to all appointments, and call your doctor if you are having problems. It's also a good idea to know your test results and keep a list of the medicines you take.  Where can you learn more?  Go to https://www.[employer].net/patiented  Enter W177 in the search box to learn more about \"Learning About Preventing Rh Disease.\"  Current as of: July 10, 2023               Content Version: 14.0    3925-5235 [employer]wise, Incorporated.   Care instructions adapted under license by [facility] healthcare " professional. If you have questions about a medical condition or this instruction, always ask your healthcare professional. Healthwise, [facility] disclaims any warranty or liability for your use of this information.      Learning About Rh Immunoglobulin Shots  Introduction     An Rh immunoglobulin shot is given to pregnant women who have Rh-negative blood.  You may have Rh-negative blood, and your baby may have Rh-positive blood. If the two types of blood mix, your body will make antibodies. This is called Rh sensitization. Most of the time, this is not a problem the first time you're pregnant. But it could cause problems in future pregnancies.  This shot keeps your body from making the antibodies. You get the shot around 28 weeks of pregnancy. After the birth, your baby's blood is tested. If the blood is Rh positive, you will get another shot. You may also get the shot if you have vaginal bleeding while you are pregnant or if you have a miscarriage. These shots protect future pregnancies.  Women with Rh negative blood will need this shot each time they get pregnant.  Example  Rh immunoglobulin (HypRho-D, MICRhoGAM, and RhoGAM)  Possible side effects  Rare side effects may include:  Some mild pain where you got the shot.  A slight fever.  An allergic reaction.  You may have other side effects not listed here. Check the information that comes with your medicine.  What to know about taking this medicine  You may need more than one shot. You may need the shot again:  After amniocentesis, fetal blood sampling, or chorionic villus sampling tests.  If you have bleeding in your second or third trimester.  After turning of a breech baby.  After an injury to the belly while you are pregnant.  After a miscarriage or an .  Before or right after treatment for an ectopic or a partial molar pregnancy.  Tell your doctor if you have any allergies or have had a bad response to medicines in the past.  If you get this shot  "within 3 months of getting a live-virus vaccine, the vaccine may not work. Your doctor will tell you if you need more vaccine.  Check with your doctor or pharmacist before you use any other medicines. This includes over-the-counter medicines. Make sure your doctor knows all of the medicines, vitamins, herbs, and supplements you take. Taking some medicines at the same time can cause problems.  Where can you learn more?  Go to https://[email or web address].Loyalize/[email or web address]  Enter V615 in the search box to learn more about \"Learning About Rh Immunoglobulin Shots.\"  Current as of: July 10, 2023               Content Version: 14.0    0763-2945 [employer].   Care instructions adapted under license by your healthcare professional. If you have questions about a medical condition or this instruction, always ask your healthcare professional. [employer] disclaims any warranty or liability for your use of this information.      Rubella (Italian Measles): Care Instructions  Overview  Rubella, also called Italian measles or 3-day measles, is a disease caused by a virus. It spreads by coughs, sneezes, and close contact. Rubella usually is mild and does not cause long-term problems. But if you are pregnant and get it, you can give the disease to your unborn baby. This can cause serious birth defects.  While you have rubella, you may get a rash and a mild fever, and the lymph glands in your neck may swell. Older children often have a fever, eye pain, a sore throat, and body aches. You can relieve most symptoms with care at home. Avoid being around others, especially pregnant people, until your rash has been gone for at least 4 days. People who have not had this disease before or have not had the vaccine have the greatest chance of getting the virus.  Follow-up care is a key part of your treatment and safety. Be sure to make and go to all appointments, and call your doctor if you are having problems. It's also a good " "idea to know your test results and keep a list of the medicines you take.  How can you care for yourself at home?  Drink plenty of fluids. If you have kidney, heart, or liver disease and have to limit fluids, talk with your doctor before you increase the amount of fluids you drink.  Get plenty of rest to help your body heal.  Take an over-the-counter pain medicine, such as acetaminophen (Tylenol), ibuprofen (Advil, Motrin), or naproxen (Aleve), to reduce fever and discomfort. Read and follow all instructions on the label. Do not give aspirin to anyone younger than 20. It has been linked to Reye syndrome, a serious illness.  Do not take two or more pain medicines at the same time unless the doctor told you to. Many pain medicines have acetaminophen, which is Tylenol. Too much acetaminophen (Tylenol) can be harmful.  Try not to scratch the rash. Put cold, wet cloths on the rash to reduce itching.  Do not smoke. Smoking can make your symptoms worse. If you need help quitting, talk to your doctor about stop-smoking programs and medicines. These can increase your chances of quitting for good.  Avoid contact with people who have never had rubella and who have not been immunized.  When should you call for help?   Call your doctor now or seek immediate medical care if:    You have a fever with a stiff neck or a severe headache.     You are sensitive to light or feel very sleepy or confused.   Watch closely for changes in your health, and be sure to contact your doctor if:    You do not get better as expected.   Where can you learn more?  Go to https://www.[employer].net/patiented  Enter B812 in the search box to learn more about \"Rubella ([place] Measles): Care Instructions.\"  Current as of: June 12, 2023               Content Version: 14.0    7861-7974 [employer].   Care instructions adapted under license by your healthcare professional. If you have questions about a medical condition or this instruction, " always ask your healthcare professional. Healthwise, [facility] disclaims any warranty or liability for your use of this information.      Gonorrhea and Chlamydia: About These Tests  What is it?  These tests use a sample of urine or other body fluid to look for the bacteria that cause these sexually transmitted infections (STIs). The fluid sample can come from the cervix, vagina, rectum, throat, or eyes.  Why is this test done?  These tests may be done to:  Find out if symptoms are caused by gonorrhea or chlamydia.  Check people who are at high risk of being infected with gonorrhea or chlamydia.  Retest people several months after they have been treated for gonorrhea or chlamydia.  Check for infection in your  if you had a gonorrhea or chlamydia infection at the time of delivery.  How can you prepare for the test?  If you are going to have a urine test, do not urinate for at least 1 hour before the test.  If you think you may have chlamydia or gonorrhea, don't have sexual intercourse until you get your test results. And you may want to have tests for other STIs, such as HIV.  How is the test done?  For a direct sample, a swab is used to collect body fluid from the cervix, vagina, rectum, throat, or eyes. Your doctor may collect the sample. Or you may be given instructions on how to collect your own sample.  For a urine sample, you will collect the urine that comes out when you first start to urinate. Don't wipe the genital area clean before you urinate.  How long does the test take?  The test will take a few minutes.  What happens after the test?  You will be able to go home right away.  You can go back to your usual activities right away.  If you do have an infection, don't have sexual intercourse for 7 days after you start treatment. And your sex partner(s) should also be treated.  Follow-up care is a key part of your treatment and safety. Be sure to make and go to all appointments, and call your doctor  "if you are having problems. It's also a good idea to keep a list of the medicines you take. Ask your doctor when you can expect to have your test results.  Where can you learn more?  Go to https://www.[employer].net/patiented  Enter K976 in the search box to learn more about \"Gonorrhea and Chlamydia: About These Tests.\"  Current as of: November 27, 2023               Content Version: 14.0    6732-5433 [employer].   Care instructions adapted under license by your healthcare professional. If you have questions about a medical condition or this instruction, always ask your healthcare professional. [employer] disclaims any warranty or liability for your use of this information.      Trichomoniasis: About This Test  What is it?     This test uses a sample of urine or other body fluid to look for the tiny parasite that causes trichomoniasis (also called trich). The fluid sample can come from the vagina, cervix, or urethra. Your doctor may choose to use one or more of many available tests.  Why is it done?  A trich test may be done to:  Find out if symptoms are caused by trich.  Check people who are at high risk for being infected with trich.  Check after treatment to make sure that the infection is gone.  How do you prepare for the test?  If you are going to have a urine test, do not urinate for at least 1 hour before the test.  How is the test done?  For a direct sample, a swab is used to collect body fluid from the cervix, vagina, or urethra. Your doctor may collect the sample. Or you may be given instructions on how to collect your own sample.  For a urine sample, you will collect the urine that comes out when you first start to urinate. Don't wipe the area clean before you urinate.  How long does the test take?  It will take a few minutes to collect a sample.  What happens after the test?  You can go home right away.  You can go back to your usual activities right away.  You may get the " test results the same day or several days later. It depends on the test used.  If you do have an infection, don't have sexual intercourse for 7 days after you start treatment. Your sex partner or partners should also be treated.  Follow-up care is a key part of your treatment and safety. Be sure to make and go to all appointments, and call your doctor if you are having problems. Ask your doctor when you can expect to have your test results.  Current as of: November 27, 2023               Content Version: 14.0    5479-4758 [employer].   Care instructions adapted under license by your healthcare professional. If you have questions about a medical condition or this instruction, always ask your healthcare professional. "[employer] disclaims any warranty or liability for your use of this information.      HIV Testing: Care Instructions  Overview  You can get tested for the human immunodeficiency virus (HIV). Most doctors use a blood test to check for HIV antibodies and antigens in your blood. It may also check for the genetic material (RNA) of HIV. Some tests use saliva to check for HIV antibodies. But these aren't as accurate. For example, they may give a false result if you've just been infected.  What do the results mean?    Normal (negative)    No HIV antibodies, antigens, or RNA were found.  You may need more testing. It can make sure your test results are correct.    Uncertain (indeterminate)    Test results didn't clearly show if you have an HIV infection.  HIV antibodies or antigens may not have formed yet.  Some other type of antibody or antigen may have affected the results.  You will need another test to be sure.    Abnormal (positive)    HIV antibodies, antigens, or RNA were found.  If you haven't had an RNA test yet, one will be done. If it's positive, you have HIV.  If your test result is positive, your doctor will talk to you. You will discuss starting treatment.  Follow-up care  "is a key part of your treatment and safety. Be sure to make and go to all appointments, and call your doctor if you are having problems. It's also a good idea to know your test results and keep a list of the medicines you take.  Where can you learn more?  Go to https://www.[employer].net/patiented  Enter T792 in the search box to learn more about \"HIV Testing: Care Instructions.\"  Current as of: June 12, 2023               Content Version: 14.0    3197-2327 [employer].   Care instructions adapted under license by your healthcare professional. If you have questions about a medical condition or this instruction, always ask your healthcare professional. [employer] disclaims any warranty or liability for your use of this information.      Hepatitis C Virus Tests: About These Tests  What are they?     Hepatitis C virus tests are blood tests that check for substances in the blood that show whether you have hepatitis C now or had it in the past. The tests can also tell you what type of hepatitis C you have and how severe the disease is. This can help your doctor with treatment.  If the tests show that you have long-term hepatitis C, you need to take steps to prevent spreading the disease.  Why are these tests done?  You may need these tests if:  You have symptoms of hepatitis.  You may have been exposed to the virus. You are more likely to have been exposed to the virus if you inject drugs or are exposed to body fluids (such as if you are a health care worker).  You've had other tests that show you have liver problems.  You are 18 to 79 years old.  You have an HIV infection.  The tests also are done to help your doctor decide about your treatment and see how well it works.  How do you prepare for the test?  In general, there's nothing you have to do before this test, unless your doctor tells you to.  How is the test done?  A health professional uses a needle to take a blood sample, usually from " "the arm.  What happens after these tests?  You will probably be able to go home right away.  You can go back to your usual activities right away.  Follow-up care is a key part of your treatment and safety. Be sure to make and go to all appointments, and call your doctor if you are having problems. It's also a good idea to keep a list of the medicines you take. Ask your doctor when you can expect to have your test results.  Where can you learn more?  Go to https://www.[employer].net/patiented  Enter W551 in the search box to learn more about \"Hepatitis C Virus Tests: About These Tests.\"  Current as of: June 12, 2023               Content Version: 14.0    6268-4559 [employer].   Care instructions adapted under license by your healthcare professional. If you have questions about a medical condition or this instruction, always ask your healthcare professional. [employer] disclaims any warranty or liability for your use of this information.      Learning About Fetal Ultrasound Results  What is a fetal ultrasound?     Fetal ultrasound is a test that lets your doctor see an image of your baby. Your doctor learns information about your baby from this picture. You may find out, for example, if you are having a boy or a girl. But the main reason you have this test is to get information about your baby's health.  (You may hear your baby called a fetus. This is a common medical term for a baby that's growing in the mother's uterus.)  What kind of information can you learn from this test?  The findings of an ultrasound fall into two categories, normal and abnormal.  Normal  The fetus is the right size for its age.  The placenta is the expected size and does not cover the cervix.  There is enough amniotic fluid in the uterus.  No birth defects can be seen.  Abnormal  The fetus is small or large for its age.  The placenta covers the cervix.  There is too much or too little amniotic fluid in the " uterus.  The fetus may have a birth defect.  What does an abnormal result mean?  Abnormal seems to imply that something is wrong with your baby. But what it means is that the test has shown something the doctor wants to take a closer look at.  And that's what happens next. Your doctor will talk to you about what further test or tests you may need.  What do the results mean?  Some of the things your doctor may see on an abnormal ultrasound include:  Echogenic bowel.  The bowel looks very bright on the screen. This could mean that there's blood in the bowel. Or it could mean that something is blocking the small bowel.  Increased nuchal translucency.  The ultrasound measures the thickness at the back of the baby's neck. An increase in thickness is sometimes an early sign of Down syndrome.  Increased or decreased amniotic fluid.  The doctor will look for a reason for the level of amniotic fluid and will watch the pregnancy closely as it progresses.  Large ventricles.  Ventricles in the brain look larger than they should. Your doctor may take a closer look at the brain.  Renal pyelectasis/hydronephrosis.  The ultrasound measures the fluid around the kidney. If there is more fluid than expected, there is a chance of urinary tract or kidney problems.  Short long bones.  The ultrasound measures certain arm and leg bones. A long bone (humerus or femur) that is shorter than average could be a sign of Down syndrome.  Subchorionic hemorrhage.  An ultrasound can show bleeding under one of the membranes that surrounds the fetus. Some women don't have symptoms of bleeding. The ultrasound can find this problem when women are not bleeding from their vagina. Women who have this condition have a slightly higher chance of miscarriage.  What do you do now?  Take a deep breath, and let it out. Keep in mind that an abnormal finding on an ultrasound, after it's coupled with more information, may:  Turn out to be nothing.  Turn out to be  "something mild that won't affect the baby.  Turn out to be something more serious. But if this happens, early diagnosis helps you and your doctor plan treatment options sooner rather than later.  Your medical team is there for you. So are your family and friends. Ask questions, and get the help and support you need.  Follow-up care is a key part of your treatment and safety. Be sure to make and go to all appointments, and call your doctor if you are having problems. It's also a good idea to know your test results and keep a list of the medicines you take.  Where can you learn more?  Go to https://www.[employer].net/patiented  Enter K451 in the search box to learn more about \"Learning About Fetal Ultrasound Results.\"  Current as of: July 10, 2023               Content Version: 14.0    4618-0511 [employer].   Care instructions adapted under license by your healthcare professional. If you have questions about a medical condition or this instruction, always ask your healthcare professional. [employer] disclaims any warranty or liability for your use of this information.      Learning About Prenatal Visits  Overview     Regular prenatal visits are very important during any pregnancy. These quick office visits may seem simple and routine. But they can help you have a safe and healthy pregnancy. Your doctor is watching for problems that can only be found through regular checkups. The visits also give you and your doctor time to build a good relationship.  After your first visit, you will most likely start on a schedule of monthly visits. In your third trimester, the visits will get more frequent. Based on your health, your age, and if you've had a normal, full-term pregnancy before, your doctor may want to see you more or less often.  At different times in your pregnancy, you will have exams and tests. Some are routine. Others are done only when there is a chance of a problem. Everything healthy " you do for your body helps you have a healthy pregnancy. Rest when you need it. Eat well, drink plenty of water, and exercise regularly.  What happens during a prenatal visit?  You will have blood pressure checks, along with urine tests. You also may have blood tests. If you need to go to the bathroom while waiting for the doctor, tell the nurse. You will be given a sample cup so your urine can be tested.  You will be weighed and have your belly measured.  Your doctor may listen to the fetal heartbeat with a special device.  At about 24 weeks, and possibly earlier in your pregnancy, your doctor will check your blood sugar (glucose tolerance test) for diabetes that can occur during pregnancy. This is gestational diabetes, which can be harmful.  You will have tests to check for infections that could harm your . These include group B streptococcus and hepatitis B.  Your doctor may do ultrasounds to check for problems. This also checks the position of the fetus. An ultrasound uses sound waves to produce a picture of the fetus.  You may get your vaccines updated.  Your doctor may ask you questions to check for signs of anxiety or depression. Tell your doctor if you feel sad, anxious, or hopeless for more than a few days.  You may have other tests at any time during your pregnancy.  Use your visits to discuss with your doctor any concerns you have.  How can you care for yourself at home?  Get plenty of rest.  Try to exercise every day, if your doctor says it is okay. If you have not exercised in the past, start out slowly. For example, you can take short walks each day.  Choose healthy foods, such as fruits, vegetables, whole grains, lean proteins, low-fat dairy, and healthy fats.  Drink plenty of fluids. Cut down on drinks with caffeine, such as coffee, tea, and cola. If you have kidney, heart, or liver disease and have to limit fluids, talk with your doctor before you increase the amount of fluids you drink.  Try  "to avoid chemical fumes, paint fumes, and poisons.  If you smoke, vape, or use alcohol, marijuana, or other drugs, quit or cut back as much as you can. Talk to your doctor if you need help quitting.  Review all of your medicines, including over-the-counter medicines and supplements, with your doctor. Some of your routine medicines may need to be changed. Do not stop or start taking any medicines without talking to your doctor first.  Follow-up care is a key part of your treatment and safety. Be sure to make and go to all appointments, and call your doctor if you are having problems. It's also a good idea to know your test results and keep a list of the medicines you take.  Where can you learn more?  Go to https://www.[employer].net/patiented  Enter J502 in the search box to learn more about \"Learning About Prenatal Visits.\"  Current as of: July 10, 2023               Content Version: 14.0    4949-6740 [employer].   Care instructions adapted under license by your healthcare professional. If you have questions about a medical condition or this instruction, always ask your healthcare professional. [employer] disclaims any warranty or liability for your use of this information.      Intimate Partner Violence: Care Instructions  Overview     If you want to save this information but don't think it is safe to take it home, see if a trusted friend can keep it for you. Plan ahead. Know who you can call for help, and memorize the phone number.   Be careful online too. Your online activity may be seen by others. Do not use your personal computer or device to read about this topic. Use a safe computer, such as one at work, a friend's home, or a library.    Intimate partner violence--a type of domestic abuse--is different from an argument now and then. It is a pattern of abuse that one person may use to control another person's behavior. It may start with threats and name-calling. Then, it may lead to " more serious acts, like pushing and slapping. The abuse also may occur in other areas. For example, the abuser may withhold money or spend a partner's money without their knowledge.  Abuse can cause serious harm. You are more likely to have a long-term health problem from the injuries and stress of living in a violent relationship. People who are sexually abused by their partners have more sexually transmitted infections and unplanned pregnancies. Anyone who is abused also faces emotional pain. Anyone can be abused in relationships. In some relationships, both people use abusive behavior.  If you are pregnant, abuse can cause problems such as poor weight gain, infections, and bleeding. Abuse during this time may increase your baby's risk of low birth weight, premature birth, and death.  Follow-up care is a key part of your treatment and safety. Be sure to make and go to all appointments, and call your doctor if you are having problems. It's also a good idea to know your test results and keep a list of the medicines you take.  How can you care for yourself at home?  If you do not have a safe place to stay, discuss this with your doctor before you leave.  Have a plan for where to go, how to leave your home, and where to stay in case of an emergency. Do not tell your partner about your plan. Contact:  The National Domestic Violence Hotline toll-free at 1-458.721.3590. They can help you find resources in your area.  Your local police department, hospital, or clinic for information about shelters and safe homes near you.  Talk to a trusted friend or neighbor, a counselor, or a rae leader. Do not feel that you have to hide what happened.  Teach your children how to call for help in an emergency.  Be alert to warning signs, such as threats, heavy alcohol use, or drug use. This can help you avoid danger.  If you can, make sure that there are no guns or other weapons in your home.  When should you call for help?   Call 359  "anytime you think you may need emergency care. For example, call if:    You or someone else has just been abused.     You think you or someone else is in danger of being abused.   Watch closely for changes in your health, and be sure to contact your doctor if you have any problems.  Where can you learn more?  Go to https://www.[employer].net/patiented  Enter G282 in the search box to learn more about \"Intimate Partner Violence: Care Instructions.\"  Current as of: June 24, 2023               Content Version: 14.0    6509-5786 [employer].   Care instructions adapted under license by your healthcare professional. If you have questions about a medical condition or this instruction, always ask your healthcare professional. [employer] disclaims any warranty or liability for your use of this information.      Intimate Partner Violence Safety Instructions: Care Instructions  Overview     If you want to save this information but don't think it is safe to take it home, see if a trusted friend can keep it for you. Plan ahead. Know who you can call for help, and memorize the phone number.   Be careful online too. Your online activity may be seen by others. Do not use your personal computer or device to read about this topic. Use a safe computer, such as one at work, a friend's home, or a library.    When you are abused by a spouse or partner, you can take actions to protect yourself and your children.  You can increase your safety whether you decide to stay with your spouse or partner or you decide to leave. You may want to make a safety plan and pack a bag ahead of time. This will help you leave quickly when you decide to. Remember, you cannot change your partner's actions, but you can find help for you and your children. No one deserves to be abused.  Follow-up care is a key part of your treatment and safety. Be sure to make and go to all appointments, and call your doctor if you are having " problems. It's also a good idea to know your test results and keep a list of the medicines you take.  How can you care for yourself at home?  Make a plan for your safety   If you decide to stay with your abusive spouse or partner, you can do the following to increase your safety:  Decide what works best to keep you safe in an emergency.  Know who you can call to help you in an emergency.  Decide if you will call the police if you get hurt again. If you can, agree on a signal with your children or neighbor to call the police for you if you need help. You can flash lights or hang something out of a window.  Choose a safe place to go for a short time if you need to leave home. Memorize the address and phone number.  Learn escape routes out of your home in case you need to leave in a hurry. Teach your children different ways to get out of your home quickly if they need to.  If you can, hide or lock up things that can be used as weapons (guns, knives, hammers).  Learn the number of a domestic violence shelter. Talk to the people there about how they can help.  Find out about other community resources that can help you.  Take pictures of bruises or other injuries if you can. You can also take pictures of things your abuser has broken.  Teach your children that violence is never okay. Tell them that they do not deserve to be hurt.  Pack a bag   Prepare a bag with things you will need if you leave suddenly. Leave it with a friend, a relative, or someone else you trust. You could include the following items in the bag:  A set of keys to your home and car.  Emergency phone numbers and addresses.  Money such as cash or checks. You can also ask a friend, a relative, or someone else you trust to hold money for you.  Copies of legal documents such as house and car titles or rent receipts, birth certificates, Social Security card, voter registration, marriage and 's licenses, and your children's health records.  Personal  "items you would need for a few days, such as clothes, a toothbrush, toothpaste, and any medicines you or your children need.  A favorite toy or book for your child or children.  Diapers and bottles, if you have very young children.  Pictures that show signs of abuse and violence. You may also add pictures of your abuser.  If you leave   If you decide to leave, you can take the following steps:  Go to the emergency room at a hospital if you have been hurt.  Think about asking the police to be with you as you leave. They can protect you as you leave your home.  If you decide to leave secretly, remember that activities can be tracked. Your abuser may still have access to your cell phone, email, and credit cards. It may be possible for these to be traced. Always be aware of your surroundings.  If this is an emergency, do not worry about gathering up anything. Just leave--your safety is most important.  If your abuser moves out, change the locks on the doors. If you have a security system, change the access code.  When should you call for help?   Call 911 anytime you think you may need emergency care. For example, call if:    You or someone else has just been abused.     You think you or someone else is in danger of being abused.   Watch closely for changes in your health, and be sure to contact your doctor if you have any problems.  Where can you learn more?  Go to https://www.1bib.net/patiented  Enter A752 in the search box to learn more about \"Intimate Partner Violence Safety Instructions: Care Instructions.\"  Current as of: June 24, 2023               Content Version: 14.0    0853-6968 [employer].   Care instructions adapted under license by your healthcare professional. If you have questions about a medical condition or this instruction, always ask your healthcare professional. [employer] disclaims any warranty or liability for your use of this information.      Learning About " Intimate Partner Violence  What is intimate partner violence?  Intimate partner violence is a type of domestic abuse. It's threatening, emotionally harmful, or violent behavior in a personal relationship. It can happen between past or current partners or spouses. In some relationships both people abuse each other. One partner may be more abusive. Or the abuse may be equal.  Abuse can affect people of any ethnic group, race, or Mormonism. It can affect teens, adults, or the elderly. And it can happen to people of any sexual orientation, gender, or social status.  Abusers use fear, bullying, and threats to control their partners. They may control what their partners do. They may control where their partners go or who they see. They may act jealous, controlling, or possessive. These early signs of abuse may happen soon after the start of the relationship. Sometimes it can be hard to notice abuse at first. But after the relationship becomes more serious, the abuse may get worse.  If you are being abused in your relationship, it's important to get help. The abuse is not your fault. You don't have to face it alone.  Be careful  It may not be safe to take home domestic abuse information like this handout. Some people ask a trusted friend to keep it for them. It's also important to plan ahead and to memorize the phone number of places you can go for help. If you are concerned about your safety, do not use your computer, smartphone, or tablet to read about domestic abuse.   What are the types of intimate partner violence?  Abuse can happen in different ways. Each type can happen on its own or in combination with others.  Emotional abuse  Emotional abuse is a pattern of threats, insults, or controlling behavior. It includes verbal abuse. It goes beyond healthy disagreements in a relationship. It's a sign of an unhealthy relationship.  Do you feel threatened, intimidated, or controlled?  Does your partner:  Threaten your  children, other family members, or pets?  Use jokes meant to embarrass or shame you?  Call you names?  Tell you that you are a bad parent?  Threaten to take away your children?  Threaten to have you or your family members deported?  Control your access to money or other basic needs?  Control what you do, who you see or talk to, or where you go?  Another form of emotional abuse is denying that it is happening. Or the abuser may act like the abuse is no big deal or is your fault.  Sexual abuse  With sexual abuse, abusers may try to convince or force you to have sex. They may force you into sex acts you're not comfortable with. Or they may sexually assault you. Sexual abuse can happen even if you are in a committed relationship.  Physical abuse  Physical abuse means that a partner hits, kicks, or does something else to physically hurt you. Physical abuse that starts with a slap might lead to kicking, shoving, and choking over time. The abuser may also threaten to hurt or kill you.  Stalking  Stalking means that an abuser gives you attention that you do not want and that causes you fear. Examples of stalking include:  Following you.  Showing up at places where the abuser isn't invited, such as at your work or school.  Constantly calling or texting you.  What problems can  to?  Intimate partner violence can be very dangerous. It can cause serious, repeated injury. It can even lead to death.  All forms of abuse can cause long-term health problems from the stress of a violent relationship. Verbal abuse can lead to sexual and physical abuse.  Abuse causes:  Emotional pain.  Depression.  Anxiety.  Post-traumatic stress.  Sexual abuse can lead to sexually transmitted infections (such as HIV/AIDS) and unplanned pregnancy.  Pregnancy can be a very dangerous time for people in abusive relationships. Abuse can cause or increase the risk of problems during pregnancy. These include low weight gain, anemia, infections, and  "bleeding. Abuse may also increase your baby's risk of low birth weight, premature birth, and death.  It can be hard for some victims of abuse to ask for help or to leave their relationship. You may feel scared, stuck, or not sure what steps to take. But it's important not to ignore abuse. Talking to someone you trust could be the first step to ending the abuse and taking care of your own health and happiness again. There are resources available that can help keep you safe.  Where can you get help?  Talk to a trusted friend. Find a local advocacy group, or talk to your doctor about the abuse.  Contact the National Domestic Violence Hotline at 4-039-179-XRTR (1-949.147.9022) for more safety tips. They can guide you to groups in your area that can help. Or go to the National Coalition Against Domestic Violence website at www.thehotl[place]oonbasa.org to learn more.  Domestic violence groups or a counselor in your area can help you make a safety plan for yourself and your children.  When to call for help  Call 911 anytime you think you may need emergency care. For example, call if:  You think that you or someone you know is in danger of being abused.  You have been hurt and can't have someone safely take you to emergency care.  You have just been abused.  A family member has just been abused.  Where can you learn more?  Go to https://www.[employer].net/patiented  Enter S665 in the search box to learn more about \"Learning About Intimate Partner Violence.\"  Current as of: June 24, 2023               Content Version: 14.0    5494-2323 [employer].   Care instructions adapted under license by your healthcare professional. If you have questions about a medical condition or this instruction, always ask your healthcare professional. [employer] disclaims any warranty or liability for your use of this information.      Vaginal Bleeding During Pregnancy: Care Instructions  Overview     It's common to have some " vaginal spotting when you are pregnant. In some cases, the bleeding isn't serious. And there aren't any more problems with the pregnancy.  But sometimes bleeding is a sign of a more serious problem. This is more common if the bleeding is heavy or painful. Examples of more serious problems include miscarriage, an ectopic pregnancy, and a problem with the placenta.  You may have to see your doctor again to be sure everything is okay. You may also need more tests to find the cause of the bleeding.  Home treatment may be all you need. But it depends on what is causing the bleeding. Be sure to tell your doctor if you have any new symptoms or if your symptoms get worse.  The doctor has checked you carefully, but problems can develop later. If you notice any problems or new symptoms, get medical treatment right away.  Follow-up care is a key part of your treatment and safety. Be sure to make and go to all appointments, and call your doctor if you are having problems. It's also a good idea to know your test results and keep a list of the medicines you take.  How can you care for yourself at home?  If your doctor prescribed medicines, take them exactly as directed. Call your doctor if you think you are having a problem with your medicine.  Do not have vaginal sex until your doctor says it's okay.  Do not put anything in your vagina until your doctor says it's okay.  Ask your doctor about other activities you can or can't do.  Get a lot of rest. Being pregnant can make you tired.  Do not use nonsteroidal anti-inflammatory drugs (NSAIDs), such as ibuprofen (Advil, Motrin), naproxen (Aleve), or aspirin, unless your doctor says it is okay.  When should you call for help?   Call 911 anytime you think you may need emergency care. For example, call if:    You passed out (lost consciousness).     You have severe vaginal bleeding. This means you are soaking through a pad each hour for 2 or more hours.     You have sudden, severe pain  "in your belly or pelvis.   Call your doctor now or seek immediate medical care if:    You have new or worse vaginal bleeding.     You are dizzy or lightheaded, or you feel like you may faint.     You have pain in your belly, pelvis, or lower back.     You think that you are in labor.     You have a sudden release of fluid from your vagina.     You've been having regular contractions for an hour. This means that you've had at least 8 contractions within 1 hour or at least 4 contractions within 20 minutes, even after you change your position and drink fluids.     You notice that your baby has stopped moving or is moving much less than normal.   Watch closely for changes in your health, and be sure to contact your doctor if you have any problems.  Where can you learn more?  Go to https://www.[employer].net/patiented  Enter N829 in the search box to learn more about \"Vaginal Bleeding During Pregnancy: Care Instructions.\"  Current as of: July 10, 2023               Content Version: 14.0    9646-8399 [employer].   Care instructions adapted under license by your healthcare professional. If you have questions about a medical condition or this instruction, always ask your healthcare professional. [employer] disclaims any warranty or liability for your use of this information.      "

## 2024-04-04 NOTE — PROGRESS NOTES
Telephone visit with patient for New Prenatal Intake and Education. This is patient's 2nd pregnancy. Handouts reviewed and will be provided at next prenatal appointment. Scheduled for New Prenatal with Dr. Weston on 4/30.       Prenatal OB Questionnaire  Patient supplied answers from flow sheet for:  Prenatal OB Questionnaire.  Past Medical History  Have you ever recieved care for your mental health? : No  Have you ever been in a major accident or suffered serious trauma?: No  Within the last year, has anyone hit, slapped, kicked or otherwise hurt you?: No  In the last year, has anyone forced you to have sex when you didn't want to?: No    Past Medical History 2   Have you ever received a blood transfusion?: No  Would you accept a blood transfusion if was medically recommended?: Yes  Does anyone in your home smoke?: No   Is your blood type Rh negative?: No  Have you ever ?: No  Have you been hospitalized for a nonsurgical reason excluding normal delivery?: No  Have you ever had an abnormal pap smear?: No    Past Medical History (Continued)  Do you have a history of abnormalities of the uterus?: No  Did your mother take JONO or any other hormones when she was pregnant with you?: No  Do you have any other problems we have not asked about which you feel may be important to this pregnancy?: No    Allergies as of 4/4/2024:    Allergies as of 04/04/2024    (No Known Allergies)       Current medications are:  Current Outpatient Medications   Medication Sig Dispense Refill    magnesium 250 MG tablet Take 1 tablet by mouth daily      PRENATAL VIT-FE BISG-FA-DHA PO       Vitamin D3 (CHOLECALCIFEROL) 25 mcg (1000 units) tablet Take by mouth daily           Early ultrasound screening tool:    Does patient have irregular periods?  No  Did patient use hormonal birth control in the three months prior to positive urine pregnancy test? No  Is the patient breastfeeding?  No  Is the patient 10 weeks or greater at time of  education visit?  No      Dating US ordered per new standing orders.  Advised pt to schedule this for when she is 8 weeks along but advised to get done prior to seeing the provider for her first prenatal visit.    Xiao Llanos RN

## 2024-04-06 ENCOUNTER — TRANSFERRED RECORDS (OUTPATIENT)
Dept: HEALTH INFORMATION MANAGEMENT | Facility: CLINIC | Age: 29
End: 2024-04-06
Payer: COMMERCIAL

## 2024-04-19 ENCOUNTER — ANCILLARY PROCEDURE (OUTPATIENT)
Dept: ULTRASOUND IMAGING | Facility: CLINIC | Age: 29
End: 2024-04-19
Attending: OBSTETRICS & GYNECOLOGY
Payer: COMMERCIAL

## 2024-04-19 DIAGNOSIS — Z34.80 PRENATAL CARE, SUBSEQUENT PREGNANCY, UNSPECIFIED TRIMESTER: ICD-10-CM

## 2024-04-19 PROCEDURE — 76801 OB US < 14 WKS SINGLE FETUS: CPT | Performed by: STUDENT IN AN ORGANIZED HEALTH CARE EDUCATION/TRAINING PROGRAM

## 2024-04-29 LAB
ABO/RH(D): NORMAL
ANTIBODY SCREEN: NEGATIVE
SPECIMEN EXPIRATION DATE: NORMAL

## 2024-04-30 ENCOUNTER — PRENATAL OFFICE VISIT (OUTPATIENT)
Dept: OBGYN | Facility: CLINIC | Age: 29
End: 2024-04-30
Payer: COMMERCIAL

## 2024-04-30 VITALS
BODY MASS INDEX: 23.31 KG/M2 | HEIGHT: 65 IN | DIASTOLIC BLOOD PRESSURE: 74 MMHG | SYSTOLIC BLOOD PRESSURE: 115 MMHG | WEIGHT: 139.9 LBS

## 2024-04-30 DIAGNOSIS — Z34.91 NORMAL PREGNANCY IN FIRST TRIMESTER: Primary | ICD-10-CM

## 2024-04-30 PROBLEM — M79.622 PAIN IN AXILLA, LEFT: Status: RESOLVED | Noted: 2020-06-09 | Resolved: 2024-04-30

## 2024-04-30 PROBLEM — F41.0 PANIC ATTACK: Status: RESOLVED | Noted: 2020-06-04 | Resolved: 2024-04-30

## 2024-04-30 PROBLEM — R07.89 TENDERNESS OF CHEST WALL: Status: RESOLVED | Noted: 2020-06-09 | Resolved: 2024-04-30

## 2024-04-30 PROBLEM — Z23 NEED FOR TDAP VACCINATION: Status: ACTIVE | Noted: 2024-04-30

## 2024-04-30 LAB
ALBUMIN UR-MCNC: 10 MG/DL
APPEARANCE UR: CLEAR
BACTERIA #/AREA URNS HPF: ABNORMAL /HPF
BILIRUB UR QL STRIP: NEGATIVE
COLOR UR AUTO: ABNORMAL
ERYTHROCYTE [DISTWIDTH] IN BLOOD BY AUTOMATED COUNT: 12.8 % (ref 10–15)
GLUCOSE UR STRIP-MCNC: NEGATIVE MG/DL
HCT VFR BLD AUTO: 36.2 % (ref 35–47)
HGB BLD-MCNC: 12.4 G/DL (ref 11.7–15.7)
HGB UR QL STRIP: NEGATIVE
KETONES UR STRIP-MCNC: NEGATIVE MG/DL
LEUKOCYTE ESTERASE UR QL STRIP: NEGATIVE
MCH RBC QN AUTO: 33.2 PG (ref 26.5–33)
MCHC RBC AUTO-ENTMCNC: 34.3 G/DL (ref 31.5–36.5)
MCV RBC AUTO: 97 FL (ref 78–100)
NITRATE UR QL: NEGATIVE
PH UR STRIP: 6 [PH] (ref 5–7)
PLATELET # BLD AUTO: 181 10E3/UL (ref 150–450)
RBC # BLD AUTO: 3.73 10E6/UL (ref 3.8–5.2)
RBC #/AREA URNS AUTO: ABNORMAL /HPF
RUBV IGG SERPL QL IA: 11.8 INDEX
RUBV IGG SERPL QL IA: POSITIVE
SP GR UR STRIP: 1.03 (ref 1–1.03)
SQUAMOUS #/AREA URNS AUTO: ABNORMAL /LPF
T PALLIDUM AB SER QL: NONREACTIVE
UROBILINOGEN UR STRIP-MCNC: NORMAL MG/DL
WBC # BLD AUTO: 7.5 10E3/UL (ref 4–11)
WBC #/AREA URNS AUTO: ABNORMAL /HPF

## 2024-04-30 PROCEDURE — 86901 BLOOD TYPING SEROLOGIC RH(D): CPT | Performed by: OBSTETRICS & GYNECOLOGY

## 2024-04-30 PROCEDURE — 86850 RBC ANTIBODY SCREEN: CPT | Performed by: OBSTETRICS & GYNECOLOGY

## 2024-04-30 PROCEDURE — 86762 RUBELLA ANTIBODY: CPT | Performed by: OBSTETRICS & GYNECOLOGY

## 2024-04-30 PROCEDURE — 86900 BLOOD TYPING SEROLOGIC ABO: CPT | Performed by: OBSTETRICS & GYNECOLOGY

## 2024-04-30 PROCEDURE — 36415 COLL VENOUS BLD VENIPUNCTURE: CPT | Performed by: OBSTETRICS & GYNECOLOGY

## 2024-04-30 PROCEDURE — 87491 CHLMYD TRACH DNA AMP PROBE: CPT | Performed by: OBSTETRICS & GYNECOLOGY

## 2024-04-30 PROCEDURE — 85027 COMPLETE CBC AUTOMATED: CPT | Performed by: OBSTETRICS & GYNECOLOGY

## 2024-04-30 PROCEDURE — 87389 HIV-1 AG W/HIV-1&-2 AB AG IA: CPT | Performed by: OBSTETRICS & GYNECOLOGY

## 2024-04-30 PROCEDURE — 86780 TREPONEMA PALLIDUM: CPT | Performed by: OBSTETRICS & GYNECOLOGY

## 2024-04-30 PROCEDURE — 87086 URINE CULTURE/COLONY COUNT: CPT | Performed by: OBSTETRICS & GYNECOLOGY

## 2024-04-30 PROCEDURE — 99459 PELVIC EXAMINATION: CPT | Performed by: OBSTETRICS & GYNECOLOGY

## 2024-04-30 PROCEDURE — 87591 N.GONORRHOEAE DNA AMP PROB: CPT | Performed by: OBSTETRICS & GYNECOLOGY

## 2024-04-30 PROCEDURE — 86803 HEPATITIS C AB TEST: CPT | Performed by: OBSTETRICS & GYNECOLOGY

## 2024-04-30 PROCEDURE — 87340 HEPATITIS B SURFACE AG IA: CPT | Performed by: OBSTETRICS & GYNECOLOGY

## 2024-04-30 PROCEDURE — 81001 URINALYSIS AUTO W/SCOPE: CPT | Performed by: OBSTETRICS & GYNECOLOGY

## 2024-04-30 PROCEDURE — 99214 OFFICE O/P EST MOD 30 MIN: CPT | Performed by: OBSTETRICS & GYNECOLOGY

## 2024-04-30 RX ORDER — CLINDAMYCIN PHOSPHATE 10 UG/ML
LOTION TOPICAL
COMMUNITY
Start: 2024-04-29

## 2024-04-30 RX ORDER — CICLOPIROX 1 G/100ML
SHAMPOO TOPICAL
COMMUNITY
Start: 2024-04-29

## 2024-04-30 NOTE — PROGRESS NOTES
HPI:    Alicia is a 28 year old yo  at 10 4/7 weeks by LMP and early ultrasound who presents today for her initial OB visit. Patient is not due for a pap smear today.    Issues: Nausea    Past OB Hx: 1 TAB     Father of baby: No health issues       Past Medical History:   Diagnosis Date    Anxiety     Swelling of lymph nodes     bilat neck             Past Surgical History:   Procedure Laterality Date    COLONOSCOPY  2022    WISDOM TOOTH EXTRACTION          Family History   Problem Relation Age of Onset    LUNG DISEASE Mother     Osteoporosis Mother     Heart Disease Father     GI problems Sister     Allergy (Severe) Sister     Lung Cancer Maternal Grandmother     Cervical Cancer Maternal Grandfather         Social History     Socioeconomic History    Marital status:      Spouse name: Not on file    Number of children: Not on file    Years of education: Not on file    Highest education level: Not on file   Occupational History    Not on file   Tobacco Use    Smoking status: Never    Smokeless tobacco: Never   Vaping Use    Vaping status: Never Used   Substance and Sexual Activity    Alcohol use: Not Currently     Alcohol/week: 1.0 standard drink of alcohol     Types: 1 Standard drinks or equivalent per week     Comment: Alcoholic Drinks/day: Social     Drug use: Never    Sexual activity: Yes     Partners: Male   Other Topics Concern    Not on file   Social History Narrative    Not on file     Social Determinants of Health     Financial Resource Strain: Low Risk  (2021)    Overall Financial Resource Strain (CARDIA)     Difficulty of Paying Living Expenses: Not hard at all   Food Insecurity: No Food Insecurity (2021)    Hunger Vital Sign     Worried About Running Out of Food in the Last Year: Never true     Ran Out of Food in the Last Year: Never true   Transportation Needs: No Transportation Needs (2021)    PRAPARE - Transportation     Lack of Transportation (Medical): No     Lack  of Transportation (Non-Medical): No   Physical Activity: Sufficiently Active (11/9/2021)    Exercise Vital Sign     Days of Exercise per Week: 5 days     Minutes of Exercise per Session: 30 min   Stress: Stress Concern Present (11/9/2021)    [employer] - Occupational Stress Questionnaire     Feeling of Stress : Rather much   Social Connections: Moderately Isolated (11/9/2021)    Social Connection and Isolation Panel [NHANES]     Frequency of Communication with Friends and Family: Twice a week     Frequency of Social Gatherings with Friends and Family: Once a week     Attends Buddhism Services: Never     Active Member of Clubs or Organizations: No     Attends Club or Organization Meetings: Not on file     Marital Status: Living with partner   Interpersonal Safety: Not on file   Housing Stability: Low Risk  (11/9/2021)    Housing Stability Vital Sign     Unable to Pay for Housing in the Last Year: No     Number of Places Lived in the Last Year: 2     Unstable Housing in the Last Year: No         Current Outpatient Medications:     magnesium 250 MG tablet, Take 1 tablet by mouth daily 200mg, Disp: , Rfl:     PRENATAL VIT-FE BISG-FA-DHA PO, , Disp: , Rfl:     Vitamin D3 (CHOLECALCIFEROL) 25 mcg (1000 units) tablet, Take by mouth daily, Disp: , Rfl:     ciclopirox 1 % SHAM, , Disp: , Rfl:     clindamycin (CLEOCIN T) 1 % external lotion, , Disp: , Rfl:       Objective:  Physical Exam:   Breast:  Benign exam, no masses palpated.  No skin changes, no axillary lymphadenopathy, no nipple discharge.  Axilla feel completely normal, no lymph node enlargement and non-tender.  Heart=RRR, no murmurs  Thyroid=normal, no masses, no TTP  Lungs=Clear to ascultation bilateral, non-labored breathing  Abd=soft, Nontender/nondistended, +bowel sounds x4  PELVIC:    External genitalia: normal without lesion  Vagina: normal mucosa and rugae, no discharge.  Cervix: normal without lesion, healthy, nulliparous, GC  and Chlamydia obtained  Uterus: small, mobile, nontender.  Adnexa: non tender, without masses  Rectal: deffered  Ext=no clubbing or cyanosis  INVx=869's    Assessment:   1.  IUP at 10 4/7 weeks here for her initial OB visit    Plan:    1.  PNV  2.  NOB labs=CBC, T&S, Hept B and C, Syphilis, Rubella, HIV, and U/A  3.  Discussed routine prenatal care, quad screen, GCT, anatomy scan at ~19 weeks, frequency of visits.  4.  Discussed first trimester screen and she wants this done.  Referral placed  5.  [place] [facility] [place]  6.  RTC 4 weeks.  7.  Nullip=ASA      Discussed physician coverage, tertiary support, diet, exercise, weight gain, schedule of visits, routine and indicated ultrasounds, and childbirth education.    Options for  testing for chromosomal and birth defects were discussed with the patient. Diagnostic tests include CVS and Amniocentesis. We discussed that these tests are definitive but invasive and do carry a risk of fetal loss.   Screening tests include nuchal translucency/blood marker testing in the first trimester and quad screening in the second trimester. We discussed that these are screening tests and not diagnostic tests and that false positives and negatives are a distinct possibility.     30 minutes was spent face to face with the patient today discussing her history, diagnosis, and follow-up plan as noted above.  Over 50% of the visit was spent in counseling and coordination of care.    Discussed aspirin use in pregnancy.  Low-dose aspirin prophylaxis can be beneficial in women at high risk of developing preeclampsia.  I generally recommend we begin aspirin at about 12-13 weeks gestation and continue until at least 36 weeks.    Women with at least one of the following conditions are considered high risk for developing preeclampsia: Previous pregnancy with preeclampsia,  multifetal-gestation, chronic hypertension, diabetes mellitus, chronic kidney disease, autoimmune disease, and  IVF.    Women with more than 1 of the following conditions may also consider low-dose aspirin prophylaxis in pregnancy: Nulliparity, BMI greater than 30, family history of preeclampsia (mother or sister), AMA, socio-demographic characteristics, personal risk factors.      Patient does meet the above criteria.  Discussed risks and benefits of low dose Asprin therapy and she elects to proceed.     Total Visit Time: 30 minutes    Vira Weston DO

## 2024-05-01 ENCOUNTER — MYC MEDICAL ADVICE (OUTPATIENT)
Dept: OBGYN | Facility: CLINIC | Age: 29
End: 2024-05-01
Payer: COMMERCIAL

## 2024-05-01 ENCOUNTER — TRANSCRIBE ORDERS (OUTPATIENT)
Dept: MATERNAL FETAL MEDICINE | Facility: CLINIC | Age: 29
End: 2024-05-01
Payer: COMMERCIAL

## 2024-05-01 DIAGNOSIS — O26.90 PREGNANCY RELATED CONDITION, ANTEPARTUM: Primary | ICD-10-CM

## 2024-05-01 LAB
C TRACH DNA SPEC QL NAA+PROBE: NEGATIVE
HBV SURFACE AG SERPL QL IA: NONREACTIVE
HCV AB SERPL QL IA: NONREACTIVE
HIV 1+2 AB+HIV1 P24 AG SERPL QL IA: NONREACTIVE
N GONORRHOEA DNA SPEC QL NAA+PROBE: NEGATIVE

## 2024-05-02 LAB — BACTERIA UR CULT: NORMAL

## 2024-05-14 ENCOUNTER — PRE VISIT (OUTPATIENT)
Dept: MATERNAL FETAL MEDICINE | Facility: CLINIC | Age: 29
End: 2024-05-14
Payer: COMMERCIAL

## 2024-05-21 ENCOUNTER — OFFICE VISIT (OUTPATIENT)
Dept: MATERNAL FETAL MEDICINE | Facility: CLINIC | Age: 29
End: 2024-05-21
Attending: STUDENT IN AN ORGANIZED HEALTH CARE EDUCATION/TRAINING PROGRAM
Payer: COMMERCIAL

## 2024-05-21 ENCOUNTER — HOSPITAL ENCOUNTER (OUTPATIENT)
Dept: ULTRASOUND IMAGING | Facility: CLINIC | Age: 29
Discharge: HOME | End: 2024-05-21
Attending: STUDENT IN AN ORGANIZED HEALTH CARE EDUCATION/TRAINING PROGRAM
Payer: COMMERCIAL

## 2024-05-21 ENCOUNTER — LAB (OUTPATIENT)
Dept: LAB | Facility: CLINIC | Age: 29
End: 2024-05-21
Attending: STUDENT IN AN ORGANIZED HEALTH CARE EDUCATION/TRAINING PROGRAM
Payer: COMMERCIAL

## 2024-05-21 DIAGNOSIS — Z36.9 ENCOUNTER FOR ANTENATAL SCREENING: ICD-10-CM

## 2024-05-21 DIAGNOSIS — O26.90 PREGNANCY RELATED CONDITION, ANTEPARTUM: Primary | ICD-10-CM

## 2024-05-21 DIAGNOSIS — O26.90 PREGNANCY RELATED CONDITION, ANTEPARTUM: ICD-10-CM

## 2024-05-21 DIAGNOSIS — Z13.71 SCREENING FOR GENETIC DISEASE CARRIER STATUS: ICD-10-CM

## 2024-05-21 DIAGNOSIS — Z84.81 FAMILY HISTORY OF GENETIC DISEASE CARRIER: Primary | ICD-10-CM

## 2024-05-21 PROCEDURE — 36415 COLL VENOUS BLD VENIPUNCTURE: CPT

## 2024-05-21 PROCEDURE — 76813 OB US NUCHAL MEAS 1 GEST: CPT | Mod: 26 | Performed by: STUDENT IN AN ORGANIZED HEALTH CARE EDUCATION/TRAINING PROGRAM

## 2024-05-21 PROCEDURE — 76813 OB US NUCHAL MEAS 1 GEST: CPT

## 2024-05-21 PROCEDURE — 96040 HC GENETIC COUNSELING, EACH 30 MINUTES: CPT | Performed by: GENETIC COUNSELOR, MS

## 2024-05-21 NOTE — NURSING NOTE
Patient presents to [facility] for GC/NT at 13w4d due to screening. Denies LOF, vaginal bleeding, cramping/contractions. SBAR given to BIENVENIDO MD, see their note in Epic.

## 2024-05-21 NOTE — PROGRESS NOTES
"Please see \"Imaging\" tab under \"Chart Review\" for details of today's visit.    Jolie Gonsalez    "

## 2024-05-21 NOTE — PROGRESS NOTES
"[facility]  Genetic Counseling Consult    Patient:  Alicia Reyes YOB: 1995   Date of Service:  24   MRN: [id number]    Alicia was seen at the [facility] for genetic counseling consultation to discuss the options for testing for fetal chromosome abnormalities.  The patient was accompanied by her partner, Jayro to today's visit.     IMPRESSION/ PLAN   1. Alicia elected to proceed with NT ultrasound and Prequel NIPT through [facility]. She opted to screen for sex chromosome aneuploidies, and microdeletion conditions. Results are expected in 10-14 days. Alicia provided verbal permission for results to be left on her voicemail. She requested the results do not include predicted fetal sex information. Patient was informed that results will also be available via [employer].    PREGNANCY HISTORY   /Parity:    Age at Delivery: 29 year old  Gestational Age: 13w4d   SHANIKA: 2024, by Last Menstrual Period             No significant complications or exposures were reported in the current pregnancy.  Alicia's pregnancy history is significant for:   IAB D&C in 2019    MEDICAL HISTORY   Alicia s reported medical history is not expected to impact pregnancy management or risks to fetal development.       FAMILY HISTORY   A three-generation pedigree was obtained today and is scanned under the \"Media\" tab in Epic. It is important to note that the family history provided is based on the patient's recollection and reported in the absence of medical records. If the family history changes or if more information is obtained, the patient should contact our clinic as this may alter recommendations.     The following significant findings were reported today:   It was reported that Jayro's cousin has a daughter with Kabuki syndrome. Kabuki syndrome is a multisystem disorder characterized by distinctive facial features, growth " delays, varying degrees of intellectual disability, skeletal abnormalities and short stature. Kabuki syndrome is caused by variants in the KMT2D or KDM6A gene. When Kabuki syndrome is caused by variants in the KMT2D gene, it is inherited in an autosomal dominant pattern. When Kabuki syndrome is caused by variants in the KDM6A gene, it is inherited in an X-linked dominant pattern. Most cases of Kabuki syndrome arise de sarahi in that individual without a family history. No other family history of Kabuki syndrome was reported.   It was reported that Alicia's uncle has Down syndrome due to nondisjunction (trisomy 21). We reviewed that trisomy 21 due to nondisjunction occurs sporadically and has a low recurrence risk.     Otherwise, the reported family history is unremarkable for multiple miscarriages, stillbirths, birth defects, intellectual disabilities, known genetic conditions, and consanguinity.       CARRIER SCREENING   Alicia previously underwent carrier screening and was found to be a carrier of alpha-1-antitrypsin deficiency and Smith-Lemli-Opitz syndrome.  Her partner, Jayro underwent carrier screening with our clinic and was found to be a carrier of Villalba syndrome. See prior genetic counseling notes. Overall, they were not found to be carriers for any of the same conditions, reducing the chance for the couple to have a child affected with any of the conditions included on the screening.        RISK ASSESSMENT FOR CHROMOSOME CONDITIONS   We explained that the risk for fetal chromosome abnormalities increases with maternal age. We discussed specific features of common chromosome abnormalities, including Down syndrome, trisomy 13, trisomy 18, and sex chromosome trisomies.    At age 29 at midtrimester, the risk to have a baby with Down syndrome is 1 in 597.  At age 29 at midtrimester, the risk to have a baby with any chromosome abnormality is 1 in 299.     GENETIC TESTING OPTIONS   Genetic testing during a pregnancy  includes screening and diagnostic procedures.      We discussed the following screening options:   Non-invasive prenatal testing (NIPT)  Also called cell-free DNA screening because it detects chromosomes from the placenta in the pregnant person's blood  Can be done any time after 10 weeks gestation  Screens for trisomy 21, trisomy 18, trisomy 13, and sex chromosome aneuploidies  Cannot screen for open neural tube defects, maternal serum AFP after 15 weeks is recommended  We also discussed that current ACMG guidelines recommend that screening for 22q11.2 deletion syndrome be offered to all pregnant patients. 22q11.2 deletion syndrome has an estimated prevalence of 1 in 990 to 1 in 2148 (0.05-0.1%). Risk is not thought to increase with maternal age. Clinical features are variable but include congenital heart defects, cleft palate, developmental delays, immune system deficiencies, and hearing loss. Approximately 90% of cases are de sarahi (a sporadic new change in a pregnancy). Cell-free DNA screening for 22q11.2 deletion syndrome is available with the inclusion of other microdeletion syndromes that are not currently recommended by society guidelines. We discussed the limitations of cell-free DNA screening in detecting microdeletions and the possiblity of false positives and false negatives.      Genome-Wide NIPT through [facility]:   This screen analyzes cell free fetal DNA for chromosome imbalances greater than 7 Mb, including whole chromosome imbalances (trisomies and monosomies) as well as duplications and deletions in this range. Further, it will screen for seven smaller, clinically relevant deletion syndrome (22q11.2, 15q11, 11q23, 8q24, 5p15, 4p16, and 1p36).   [facility] reports an estimated genome-wide sensitivity of up to 96%, though fetal fraction and event size can significantly impact the sensitivity.   Negative screening reduces but does not eliminate the risk for the conditions screened and in no way replaces  fetal microarray. Following a positive screen, evaluation by ultrasound and diagnostic testing is recommended. Additionally, there are significant limitations to positive screens involving certain chromosomal differences that are much more likely to be present in the placenta only. In clinical practice, genome-wide cfDNA screening is considered investigational. However, [facility] does report that up to 30% of their positive MaterniT Genome findings would not be detected through routine NIPT, making it an option in the setting of complex anomalies.    AFP only  Blood work from arm for levels of AFP (alpha-fetoprotein)  Can be done between 14w1d and 23w6d gestation  Screens for open neural tube defects like spina bifida  Recommended for those that do not want genetic testing (for chromosome conditions), those who did screening other than the quad screen, and those with a personal or family history of neural tube defects.    We discussed the following ultrasound options:  Nuchal translucency (NT) ultrasound  Ultrasound between [id number] that includes nuchal translucency measurement and nasal bone assessments  Nuchal translucency refers to the space at the back of the neck where fluid builds up. All babies at this stage have fluid and there is only concern if there is too much fluid  Nasal bone refers to the small bone in the nose. There is concern for conditions like Down syndrome if the bone cannot be seen at all  This ultrasound can be done as part of first trimester screening, at the same time as another screen (NIPT), at the same time as a CVS, or if the patients does not want genetic screening.  Markers on ultrasound detects about 70% of pregnancies with aneuploidy  Abnormalities on NT ultrasound can also increase the risk for a birth defect, like a heart defect    We discussed the following diagnostic options:   Chorionic villus sampling (CVS)  Invasive diagnostic procedure done between 10w0d and 13w6d  The  procedure collects a small sample from the placenta for the purpose of chromosomal testing and/or other genetic testing  Diagnostic result; more than 99% sensitivity for fetal chromosome abnormalities  Cannot screen for open neural tube defects, maternal serum AFP after 15 weeks is recommended    Amniocentesis  Invasive diagnostic procedure done after 15 weeks gestation  The procedure collects a small sample of amniotic fluid for the purpose of chromosomal testing and/or other genetic testing  Diagnostic result; more than 99% sensitivity for fetal chromosome abnormalities  Testing for AFP in the amniotic fluid can test for open neural tube defects    The benefits and limitations of noninvasive screening were reviewed. Screening tests provide a risk assessment (chance) specific to the pregnancy for certain fetal chromosome abnormalities but cannot definitively diagnose or exclude a fetal chromosome abnormality. Follow-up genetic counseling and consideration of diagnostic testing is recommended with any abnormal screening result. Diagnostic testing during a pregnancy is more certain and can test for more conditions. However, the tests do have a risk of miscarriage that requires careful consideration. These tests can detect fetal chromosome abnormalities with greater than 99% certainty. Results can be compromised by maternal cell contamination or mosaicism and are limited by the resolution of current genetic testing technology. There is no screening or diagnostic test that detects all forms of birth defects or intellectual disability.     It was a pleasure to be involved with Alicia beauchamp [facility]. Face-to-face time of the meeting was 30 minutes.    Roxi Parsons MS, [facility]  Licensed Genetic Counselor  [facility]  Maternal Fetal Medicine  Ph: [phone]  [email or web address]

## 2024-05-28 ENCOUNTER — PRENATAL OFFICE VISIT (OUTPATIENT)
Dept: OBGYN | Facility: CLINIC | Age: 29
End: 2024-05-28
Payer: COMMERCIAL

## 2024-05-28 VITALS — DIASTOLIC BLOOD PRESSURE: 85 MMHG | SYSTOLIC BLOOD PRESSURE: 126 MMHG | WEIGHT: 140 LBS | BODY MASS INDEX: 23.66 KG/M2

## 2024-05-28 DIAGNOSIS — Z34.92 NORMAL PREGNANCY IN SECOND TRIMESTER: Primary | ICD-10-CM

## 2024-05-28 PROBLEM — E28.2 PCOS (POLYCYSTIC OVARIAN SYNDROME): Status: ACTIVE | Noted: 2024-05-28

## 2024-05-28 PROCEDURE — 99207 PR PRENATAL VISIT: CPT | Performed by: OBSTETRICS & GYNECOLOGY

## 2024-05-28 NOTE — PATIENT INSTRUCTIONS
Please call if you any questions.    [facility]  [address] [place]   [address]  [phone]        Vira Weston,

## 2024-05-28 NOTE — PROGRESS NOTES
28 year old  at 14w4d weeks presents to the clinic for a routine prenatal visit.  Feeling well.  No vaginal bleeding, leakage of fluid, or contractions   Fundal height=s=d  NZFs=958  Genetic testing in process  Anatomy ultrasound already scheduled  RTC 4 weeks.    Vira Weston DO

## 2024-05-29 ENCOUNTER — TELEPHONE (OUTPATIENT)
Dept: MATERNAL FETAL MEDICINE | Facility: CLINIC | Age: 29
End: 2024-05-29
Payer: COMMERCIAL

## 2024-05-29 LAB — SCANNED LAB RESULT: NORMAL

## 2024-05-29 NOTE — CONFIDENTIAL NOTE
May 29, 2024    I left a message for Alicia regarding her non-invasive prenatal test (NIPT) results.     Results indicate NO ANEUPLOIDY DETECTED for chromosomes 21, 18, 13, or the sex chromosomes (patient requested predicted sex not be left in her voicemail). Results were also also screen negative for microdeletions (1p36, 4p, 5p, 15q11.2, and 22q11.2).    This puts her current pregnancy at low risk for Down syndrome, trisomy 18, trisomy 13, sex chromosome abnormalities, and the above listed microdeletion conditions. This test is reported to have the following sensitivities: Down syndrome: 99.7%, trisomy 18: 97.9%, and trisomy 13: 99.0%. Although these results are reassuring, this does not replace a standard chromosome analysis from a chorionic villus sampling or amniocentesis.     Level II ultrasound is recommended and has been scheduled for 06/25/2024.    MSAFP is the appropriate second trimester screening test for open neural tube defects; the maternal quad screen is not recommended.    Her results will be made available in her Epic chart for her primary OB to review.       Isa Aleman MS, [facility]  Maternal Fetal Medicine  Office: [phone]  [facility]: [phone]   Fax: [phone]  [facility]

## 2024-10-25 LAB — GROUP B STREPTOCOCCUS (EXTERNAL): NEGATIVE

## 2024-11-05 NOTE — PROGRESS NOTES
Echocardiogram is normal, please call results to the patient or send a letter if not reachable by phone.
no diplopia

## 2024-11-14 ENCOUNTER — HOSPITAL ENCOUNTER (INPATIENT)
Facility: CLINIC | Age: 29
LOS: 2 days | Discharge: HOME | End: 2024-11-16
Attending: STUDENT IN AN ORGANIZED HEALTH CARE EDUCATION/TRAINING PROGRAM | Admitting: STUDENT IN AN ORGANIZED HEALTH CARE EDUCATION/TRAINING PROGRAM
Payer: COMMERCIAL

## 2024-11-14 LAB
ABO/RH(D): NORMAL
ANTIBODY SCREEN: NEGATIVE
BASOPHILS # BLD AUTO: 0 10E3/UL (ref 0–0.2)
BASOPHILS NFR BLD AUTO: 0 %
EOSINOPHIL # BLD AUTO: 0 10E3/UL (ref 0–0.7)
EOSINOPHIL NFR BLD AUTO: 0 %
ERYTHROCYTE [DISTWIDTH] IN BLOOD BY AUTOMATED COUNT: 16.4 % (ref 10–15)
HCT VFR BLD AUTO: 34.9 % (ref 35–47)
HGB BLD-MCNC: 11.8 G/DL (ref 11.7–15.7)
HOLD SPECIMEN: NORMAL
HOLD SPECIMEN: NORMAL
IMM GRANULOCYTES # BLD: 0 10E3/UL
IMM GRANULOCYTES NFR BLD: 0 %
LYMPHOCYTES # BLD AUTO: 2.2 10E3/UL (ref 0.8–5.3)
LYMPHOCYTES NFR BLD AUTO: 30 %
MCH RBC QN AUTO: 32.3 PG (ref 26.5–33)
MCHC RBC AUTO-ENTMCNC: 33.8 G/DL (ref 31.5–36.5)
MCV RBC AUTO: 96 FL (ref 78–100)
MONOCYTES # BLD AUTO: 0.3 10E3/UL (ref 0–1.3)
MONOCYTES NFR BLD AUTO: 5 %
NEUTROPHILS # BLD AUTO: 4.8 10E3/UL (ref 1.6–8.3)
NEUTROPHILS NFR BLD AUTO: 65 %
NRBC # BLD AUTO: 0 10E3/UL
NRBC BLD AUTO-RTO: 0 /100
PLATELET # BLD AUTO: 157 10E3/UL (ref 150–450)
RBC # BLD AUTO: 3.65 10E6/UL (ref 3.8–5.2)
RUPTURE OF FETAL MEMBRANES BY ROM PLUS: POSITIVE
SPECIMEN EXPIRATION DATE: NORMAL
T PALLIDUM AB SER QL: NONREACTIVE
WBC # BLD AUTO: 7.3 10E3/UL (ref 4–11)

## 2024-11-14 PROCEDURE — G0463 HOSPITAL OUTPT CLINIC VISIT: HCPCS

## 2024-11-14 PROCEDURE — 86900 BLOOD TYPING SEROLOGIC ABO: CPT | Performed by: STUDENT IN AN ORGANIZED HEALTH CARE EDUCATION/TRAINING PROGRAM

## 2024-11-14 PROCEDURE — 86850 RBC ANTIBODY SCREEN: CPT | Performed by: STUDENT IN AN ORGANIZED HEALTH CARE EDUCATION/TRAINING PROGRAM

## 2024-11-14 PROCEDURE — 84112 EVAL AMNIOTIC FLUID PROTEIN: CPT | Performed by: STUDENT IN AN ORGANIZED HEALTH CARE EDUCATION/TRAINING PROGRAM

## 2024-11-14 PROCEDURE — 36415 COLL VENOUS BLD VENIPUNCTURE: CPT | Performed by: STUDENT IN AN ORGANIZED HEALTH CARE EDUCATION/TRAINING PROGRAM

## 2024-11-14 PROCEDURE — 86780 TREPONEMA PALLIDUM: CPT | Performed by: STUDENT IN AN ORGANIZED HEALTH CARE EDUCATION/TRAINING PROGRAM

## 2024-11-14 PROCEDURE — 120N000001 HC R&B MED SURG/OB

## 2024-11-14 PROCEDURE — 85025 COMPLETE CBC W/AUTO DIFF WBC: CPT | Performed by: STUDENT IN AN ORGANIZED HEALTH CARE EDUCATION/TRAINING PROGRAM

## 2024-11-14 RX ORDER — TRANEXAMIC ACID 10 MG/ML
1 INJECTION, SOLUTION INTRAVENOUS EVERY 30 MIN PRN
Status: DISCONTINUED | OUTPATIENT
Start: 2024-11-14 | End: 2024-11-15 | Stop reason: HOSPADM

## 2024-11-14 RX ORDER — NALOXONE HYDROCHLORIDE 0.4 MG/ML
0.2 INJECTION, SOLUTION INTRAMUSCULAR; INTRAVENOUS; SUBCUTANEOUS
Status: DISCONTINUED | OUTPATIENT
Start: 2024-11-14 | End: 2024-11-15 | Stop reason: HOSPADM

## 2024-11-14 RX ORDER — ACETAMINOPHEN 325 MG/1
650 TABLET ORAL EVERY 4 HOURS PRN
Status: DISCONTINUED | OUTPATIENT
Start: 2024-11-14 | End: 2024-11-15 | Stop reason: HOSPADM

## 2024-11-14 RX ORDER — CITRIC ACID/SODIUM CITRATE 334-500MG
30 SOLUTION, ORAL ORAL
Status: DISCONTINUED | OUTPATIENT
Start: 2024-11-14 | End: 2024-11-15 | Stop reason: HOSPADM

## 2024-11-14 RX ORDER — CARBOPROST TROMETHAMINE 250 UG/ML
250 INJECTION, SOLUTION INTRAMUSCULAR
Status: DISCONTINUED | OUTPATIENT
Start: 2024-11-14 | End: 2024-11-15 | Stop reason: HOSPADM

## 2024-11-14 RX ORDER — LOPERAMIDE HYDROCHLORIDE 2 MG/1
4 CAPSULE ORAL
Status: DISCONTINUED | OUTPATIENT
Start: 2024-11-14 | End: 2024-11-15 | Stop reason: HOSPADM

## 2024-11-14 RX ORDER — OXYTOCIN/0.9 % SODIUM CHLORIDE 30/500 ML
100-340 PLASTIC BAG, INJECTION (ML) INTRAVENOUS CONTINUOUS PRN
Status: DISCONTINUED | OUTPATIENT
Start: 2024-11-14 | End: 2024-11-15

## 2024-11-14 RX ORDER — FENTANYL CITRATE 50 UG/ML
100 INJECTION, SOLUTION INTRAMUSCULAR; INTRAVENOUS
Status: DISCONTINUED | OUTPATIENT
Start: 2024-11-14 | End: 2024-11-15 | Stop reason: HOSPADM

## 2024-11-14 RX ORDER — KETOROLAC TROMETHAMINE 30 MG/ML
30 INJECTION, SOLUTION INTRAMUSCULAR; INTRAVENOUS
Status: DISCONTINUED | OUTPATIENT
Start: 2024-11-14 | End: 2024-11-15

## 2024-11-14 RX ORDER — OXYTOCIN 10 [USP'U]/ML
10 INJECTION, SOLUTION INTRAMUSCULAR; INTRAVENOUS
Status: DISCONTINUED | OUTPATIENT
Start: 2024-11-14 | End: 2024-11-15

## 2024-11-14 RX ORDER — SODIUM CHLORIDE, SODIUM LACTATE, POTASSIUM CHLORIDE, CALCIUM CHLORIDE 600; 310; 30; 20 MG/100ML; MG/100ML; MG/100ML; MG/100ML
INJECTION, SOLUTION INTRAVENOUS CONTINUOUS
Status: DISCONTINUED | OUTPATIENT
Start: 2024-11-14 | End: 2024-11-15 | Stop reason: HOSPADM

## 2024-11-14 RX ORDER — ONDANSETRON 2 MG/ML
4 INJECTION INTRAMUSCULAR; INTRAVENOUS EVERY 6 HOURS PRN
Status: DISCONTINUED | OUTPATIENT
Start: 2024-11-14 | End: 2024-11-15 | Stop reason: HOSPADM

## 2024-11-14 RX ORDER — FERROUS SULFATE 325(65) MG
325 TABLET ORAL
Status: ON HOLD | COMMUNITY
End: 2024-11-16

## 2024-11-14 RX ORDER — METOCLOPRAMIDE HYDROCHLORIDE 5 MG/ML
10 INJECTION INTRAMUSCULAR; INTRAVENOUS EVERY 6 HOURS PRN
Status: DISCONTINUED | OUTPATIENT
Start: 2024-11-14 | End: 2024-11-15 | Stop reason: HOSPADM

## 2024-11-14 RX ORDER — METOCLOPRAMIDE 10 MG/1
10 TABLET ORAL EVERY 6 HOURS PRN
Status: DISCONTINUED | OUTPATIENT
Start: 2024-11-14 | End: 2024-11-15 | Stop reason: HOSPADM

## 2024-11-14 RX ORDER — METHYLERGONOVINE MALEATE 0.2 MG/ML
200 INJECTION INTRAVENOUS
Status: DISCONTINUED | OUTPATIENT
Start: 2024-11-14 | End: 2024-11-15 | Stop reason: HOSPADM

## 2024-11-14 RX ORDER — LOPERAMIDE HYDROCHLORIDE 2 MG/1
2 CAPSULE ORAL
Status: DISCONTINUED | OUTPATIENT
Start: 2024-11-14 | End: 2024-11-15 | Stop reason: HOSPADM

## 2024-11-14 RX ORDER — MISOPROSTOL 200 UG/1
400 TABLET ORAL
Status: DISCONTINUED | OUTPATIENT
Start: 2024-11-14 | End: 2024-11-15 | Stop reason: HOSPADM

## 2024-11-14 RX ORDER — NALOXONE HYDROCHLORIDE 0.4 MG/ML
0.4 INJECTION, SOLUTION INTRAMUSCULAR; INTRAVENOUS; SUBCUTANEOUS
Status: DISCONTINUED | OUTPATIENT
Start: 2024-11-14 | End: 2024-11-15 | Stop reason: HOSPADM

## 2024-11-14 RX ORDER — PROCHLORPERAZINE MALEATE 5 MG/1
10 TABLET ORAL EVERY 6 HOURS PRN
Status: DISCONTINUED | OUTPATIENT
Start: 2024-11-14 | End: 2024-11-15 | Stop reason: HOSPADM

## 2024-11-14 RX ORDER — OXYTOCIN/0.9 % SODIUM CHLORIDE 30/500 ML
340 PLASTIC BAG, INJECTION (ML) INTRAVENOUS CONTINUOUS PRN
Status: DISCONTINUED | OUTPATIENT
Start: 2024-11-14 | End: 2024-11-15 | Stop reason: HOSPADM

## 2024-11-14 RX ORDER — ONDANSETRON 4 MG/1
4 TABLET, ORALLY DISINTEGRATING ORAL EVERY 6 HOURS PRN
Status: DISCONTINUED | OUTPATIENT
Start: 2024-11-14 | End: 2024-11-15 | Stop reason: HOSPADM

## 2024-11-14 RX ORDER — OXYTOCIN 10 [USP'U]/ML
10 INJECTION, SOLUTION INTRAMUSCULAR; INTRAVENOUS
Status: DISCONTINUED | OUTPATIENT
Start: 2024-11-14 | End: 2024-11-15 | Stop reason: HOSPADM

## 2024-11-14 RX ORDER — LIDOCAINE 40 MG/G
CREAM TOPICAL
Status: DISCONTINUED | OUTPATIENT
Start: 2024-11-14 | End: 2024-11-15 | Stop reason: HOSPADM

## 2024-11-14 RX ORDER — IBUPROFEN 400 MG/1
800 TABLET, FILM COATED ORAL
Status: DISCONTINUED | OUTPATIENT
Start: 2024-11-14 | End: 2024-11-15

## 2024-11-14 RX ORDER — MISOPROSTOL 200 UG/1
800 TABLET ORAL
Status: DISCONTINUED | OUTPATIENT
Start: 2024-11-14 | End: 2024-11-15 | Stop reason: HOSPADM

## 2024-11-14 ASSESSMENT — ACTIVITIES OF DAILY LIVING (ADL)
ADLS_ACUITY_SCORE: 0

## 2024-11-14 NOTE — PROGRESS NOTES
1356: Monitors reapplied in room 220. Birthing ball offered, but Pt requested to rest a bit in bed.    1440: Pt at bedside with a birthing ball.    1530: Saline lock placed.    1535: Up to walk on unit.    1627: Dr. Astudillo at bedside to see Pt and discuss POC. SVE with slight change noted. Pt decided she wanted to walk another couple hours before starting Pitocin.    1845: SVE: 5/90/-2 1915: Bedside report to SHAINA Gomez

## 2024-11-14 NOTE — PROGRESS NOTES
Pt comes to triage with c/o fluid leaking since her morning shower and a few gushes at 10am and later.    EUM/US applied. VSS. Admission database obtained and prenatal record reviewed.    ROM+ collected and sent to lab. SVE deferred at this time.    ROM+ resulted positive.    Update to Dr. Astudillo, and admission orders received by phone.    Pt transferred to 220.    SVE performed by RN with change noted from office exam.

## 2024-11-14 NOTE — H&P
"H&P Update    Prenatal record and nursing notes reviewed. No significant changes since last seen.     30 yo  at 38w6d here for SROM. Uncomplicated pregnancy. Rh positive, GBS negative.     Patient Vitals for the past 24 hrs:   BP Temp Temp src Resp Height Weight   24 1310 129/88 98.1  F (36.7  C) Temporal -- -- --   24 1300 -- -- -- -- 1.651 m (5' 5\") 72.1 kg (159 lb)   24 1249 138/89 -- -- 16 -- --     SVE: 3.5/80-90/-2  Membranes: SROM, clear 1000    FHT: Cat I  Bier: Has been ambulating but prior to this jayden q2-4 min    30 yo  at 38w6d here for SROM.   Has made small cervical change since admission. Patient prefers to observe for another 2 hours. Discussed if no significant cervical change at that time, recommend pitocin augmentation. She is amenable. Plan SVE in 2 hours and start pitocin if no significant clinical change.  Pain management per patient request. Hoping for unmedicated delivery.  GBS negative, no abx indicated.  FHT Cat I,    Anticipate     Ana Astudillo MD  "

## 2024-11-15 PROCEDURE — 250N000009 HC RX 250: Performed by: STUDENT IN AN ORGANIZED HEALTH CARE EDUCATION/TRAINING PROGRAM

## 2024-11-15 PROCEDURE — 120N000012 HC R&B POSTPARTUM

## 2024-11-15 PROCEDURE — 250N000013 HC RX MED GY IP 250 OP 250 PS 637: Performed by: STUDENT IN AN ORGANIZED HEALTH CARE EDUCATION/TRAINING PROGRAM

## 2024-11-15 PROCEDURE — 722N000001 HC LABOR CARE VAGINAL DELIVERY SINGLE

## 2024-11-15 PROCEDURE — 0KQM0ZZ REPAIR PERINEUM MUSCLE, OPEN APPROACH: ICD-10-PCS | Performed by: STUDENT IN AN ORGANIZED HEALTH CARE EDUCATION/TRAINING PROGRAM

## 2024-11-15 RX ORDER — BISACODYL 10 MG
10 SUPPOSITORY, RECTAL RECTAL DAILY PRN
Status: DISCONTINUED | OUTPATIENT
Start: 2024-11-15 | End: 2024-11-16 | Stop reason: HOSPADM

## 2024-11-15 RX ORDER — OXYTOCIN/0.9 % SODIUM CHLORIDE 30/500 ML
340 PLASTIC BAG, INJECTION (ML) INTRAVENOUS CONTINUOUS PRN
Status: DISCONTINUED | OUTPATIENT
Start: 2024-11-15 | End: 2024-11-16 | Stop reason: HOSPADM

## 2024-11-15 RX ORDER — IBUPROFEN 400 MG/1
800 TABLET, FILM COATED ORAL EVERY 6 HOURS PRN
Status: DISCONTINUED | OUTPATIENT
Start: 2024-11-15 | End: 2024-11-16 | Stop reason: HOSPADM

## 2024-11-15 RX ORDER — LOPERAMIDE HYDROCHLORIDE 2 MG/1
2 CAPSULE ORAL
Status: DISCONTINUED | OUTPATIENT
Start: 2024-11-15 | End: 2024-11-16 | Stop reason: HOSPADM

## 2024-11-15 RX ORDER — METHYLERGONOVINE MALEATE 0.2 MG/ML
200 INJECTION INTRAVENOUS
Status: DISCONTINUED | OUTPATIENT
Start: 2024-11-15 | End: 2024-11-16 | Stop reason: HOSPADM

## 2024-11-15 RX ORDER — LOPERAMIDE HYDROCHLORIDE 2 MG/1
4 CAPSULE ORAL
Status: DISCONTINUED | OUTPATIENT
Start: 2024-11-15 | End: 2024-11-16 | Stop reason: HOSPADM

## 2024-11-15 RX ORDER — MISOPROSTOL 200 UG/1
800 TABLET ORAL
Status: DISCONTINUED | OUTPATIENT
Start: 2024-11-15 | End: 2024-11-16 | Stop reason: HOSPADM

## 2024-11-15 RX ORDER — OXYTOCIN 10 [USP'U]/ML
10 INJECTION, SOLUTION INTRAMUSCULAR; INTRAVENOUS
Status: DISCONTINUED | OUTPATIENT
Start: 2024-11-15 | End: 2024-11-16 | Stop reason: HOSPADM

## 2024-11-15 RX ORDER — ACETAMINOPHEN 325 MG/1
650 TABLET ORAL EVERY 4 HOURS PRN
Status: DISCONTINUED | OUTPATIENT
Start: 2024-11-15 | End: 2024-11-16 | Stop reason: HOSPADM

## 2024-11-15 RX ORDER — MODIFIED LANOLIN
OINTMENT (GRAM) TOPICAL
Status: DISCONTINUED | OUTPATIENT
Start: 2024-11-15 | End: 2024-11-16 | Stop reason: HOSPADM

## 2024-11-15 RX ORDER — CARBOPROST TROMETHAMINE 250 UG/ML
250 INJECTION, SOLUTION INTRAMUSCULAR
Status: DISCONTINUED | OUTPATIENT
Start: 2024-11-15 | End: 2024-11-16 | Stop reason: HOSPADM

## 2024-11-15 RX ORDER — HYDROCORTISONE 25 MG/G
CREAM TOPICAL 3 TIMES DAILY PRN
Status: DISCONTINUED | OUTPATIENT
Start: 2024-11-15 | End: 2024-11-16 | Stop reason: HOSPADM

## 2024-11-15 RX ORDER — DOCUSATE SODIUM 100 MG/1
100 CAPSULE, LIQUID FILLED ORAL DAILY
Status: DISCONTINUED | OUTPATIENT
Start: 2024-11-15 | End: 2024-11-16 | Stop reason: HOSPADM

## 2024-11-15 RX ORDER — TRANEXAMIC ACID 10 MG/ML
1 INJECTION, SOLUTION INTRAVENOUS EVERY 30 MIN PRN
Status: DISCONTINUED | OUTPATIENT
Start: 2024-11-15 | End: 2024-11-16 | Stop reason: HOSPADM

## 2024-11-15 RX ORDER — MISOPROSTOL 200 UG/1
400 TABLET ORAL
Status: DISCONTINUED | OUTPATIENT
Start: 2024-11-15 | End: 2024-11-16 | Stop reason: HOSPADM

## 2024-11-15 RX ADMIN — ACETAMINOPHEN 650 MG: 325 TABLET, FILM COATED ORAL at 11:57

## 2024-11-15 RX ADMIN — IBUPROFEN 800 MG: 400 TABLET ORAL at 11:57

## 2024-11-15 RX ADMIN — ACETAMINOPHEN 650 MG: 325 TABLET, FILM COATED ORAL at 18:26

## 2024-11-15 RX ADMIN — Medication 340 ML/HR: at 02:45

## 2024-11-15 RX ADMIN — ACETAMINOPHEN 650 MG: 325 TABLET, FILM COATED ORAL at 03:22

## 2024-11-15 RX ADMIN — IBUPROFEN 800 MG: 400 TABLET ORAL at 18:26

## 2024-11-15 RX ADMIN — IBUPROFEN 800 MG: 400 TABLET ORAL at 03:22

## 2024-11-15 RX ADMIN — DOCUSATE SODIUM 100 MG: 100 CAPSULE, LIQUID FILLED ORAL at 11:58

## 2024-11-15 NOTE — PLAN OF CARE
Goal Outcome Evaluation:      Plan of Care Reviewed With: patient    Overall Patient Progress: improvingOverall Patient Progress: improving     Patient transferred from labor and delivery at 0745. Report called by Patricia MERRITT. Safety and security, and education reviewed. Baby bands checked.    VSS. Fundus firm and midline. Scant flow. Pain 5/10, pain controled with tylenol and ibuprofen per MAR. Breastfeeding. Ambulating free of dizziness or lightheaded. Voiding without difficulty. Encouraged to call with needs, questions, or concerns.

## 2024-11-15 NOTE — PLAN OF CARE
Problem: Labor  Goal: Hemostasis  Outcome: Met  Goal: Stable Fetal Wellbeing  Outcome: Met  Goal: Effective Progression to Delivery  Outcome: Met  Goal: Absence of Infection Signs and Symptoms  Outcome: Met  Goal: Acceptable Pain Control  Outcome: Met  Intervention: Support Labor Pain Coping and Management  Recent Flowsheet Documentation  Taken 11/14/2024 2335 by Rosa Real, RN  Complementary Therapy: aromatherapy utilized  Goal: Normal Uterine Contraction Pattern  Outcome: Met   Goal Outcome Evaluation:

## 2024-11-15 NOTE — L&D DELIVERY NOTE
Delivery Summary:     Alicia Reyes is a 29 year old  at 39w0d, admitted for SROM. Her pregnancy was uncomplicated. GBS negative.     She presented with SROM. She transitioned into spontaneous labor without augmentation. FHTs were reassuring during labor. She was unmedicated. She progressed along a normal labor curve to complete dilation at 0207. She began pushing immediately and pushed for 30 minutes with good maternal effort. The infant delivered spontaneously in OA position over an intact perineum at 0243. The anterior shoulder was delivered easily followed by the posterior shoulder. Nuchal cord x1 noted and reduced after delivery. Infant with spontaneous cry and was placed on the mother's chest. Apgars 8 and 9, weight pending de leon hour. The cord was doubly clamped and cut after 60 seconds. The placenta delivered with gentle cord traction and suprapubic countertraction, and was noted to be intact with 3 vessel cord. IV pitocin was given. The perineum was examined and a second degree laceration noted. This was repaired in standard fashion using 3-0 Chromic. Hemostasis was achieved. Fundus firm and lochia minimal at the end of the case. QBL <100 mL. The patient tolerated the procedure well. The infant remained with mother for the transition period.     Final delivery diagnosis:  Term gestation, delivered by   Second degree perineal laceration, repaired     Pita Head MD  Obstetrics, Gynecology and Infertility   11/15/24

## 2024-11-15 NOTE — PROVIDER NOTIFICATION
11/14/24 2029   Provider Notification   Provider Name/Title Dr. Astudillo   Method of Notification Phone   Notification Reason Maternal Vital Sign Change     Dr. Astudillo updated on BP changes, ok to watch for now. Update on call if continues. Also, ok for intermittent auscultation.

## 2024-11-15 NOTE — LACTATION NOTE
Initial Lactation visit with Alicia and baby boy. Alicia reports feeding is going well so far. Discussed the feelings and looks of a deep latch. Discussed nipple shape immediately after baby unlatches. Encouraged Alicia to call with latch checks.     Recommend unlimited, frequent breast feedings: At least 8 - 12 times every 24 hours. Recommended rooming in. Instructed in hand expression. Avoid pacifiers and supplementation with formula unless medically indicated. Explained benefits of holding baby skin on skin to help promote better breastfeeding outcomes. Alicia has a pump for home use. Will revisit as needed.    Audrey Galarza RN, IBCLC

## 2024-11-15 NOTE — PROVIDER NOTIFICATION
11/14/24 2203   Provider Notification   Provider Name/Title Dr. Head   Method of Notification Phone   Notification Reason SVE;Status Update     Dr. Head verbal order to start Pitocin if no cervical change has been made following next SVE at 2330. IA monitoring to continue at this time.

## 2024-11-15 NOTE — PROGRESS NOTES
Data:  of viable male baby born at 39w0d. Delivery unremarkable, apgars 8/9.  Action: Interventions at birth were drying and warm blankets. Infant placed skin-to-skin with mother.  Response: Stable . Positive bonding behaviors observed, breastfeeding initiated.

## 2024-11-16 VITALS
TEMPERATURE: 98.2 F | HEART RATE: 77 BPM | DIASTOLIC BLOOD PRESSURE: 76 MMHG | HEIGHT: 65 IN | BODY MASS INDEX: 24.47 KG/M2 | RESPIRATION RATE: 16 BRPM | WEIGHT: 146.9 LBS | SYSTOLIC BLOOD PRESSURE: 119 MMHG

## 2024-11-16 PROCEDURE — 250N000013 HC RX MED GY IP 250 OP 250 PS 637: Performed by: STUDENT IN AN ORGANIZED HEALTH CARE EDUCATION/TRAINING PROGRAM

## 2024-11-16 RX ORDER — IBUPROFEN 800 MG/1
800 TABLET, FILM COATED ORAL EVERY 6 HOURS PRN
Qty: 56 TABLET | Refills: 0 | Status: SHIPPED | OUTPATIENT
Start: 2024-11-16 | End: 2024-11-30

## 2024-11-16 RX ORDER — ACETAMINOPHEN 325 MG/1
650 TABLET ORAL EVERY 4 HOURS PRN
Qty: 168 TABLET | Refills: 0 | Status: SHIPPED | OUTPATIENT
Start: 2024-11-16 | End: 2024-11-30

## 2024-11-16 RX ADMIN — IBUPROFEN 800 MG: 400 TABLET ORAL at 00:22

## 2024-11-16 RX ADMIN — DOCUSATE SODIUM 100 MG: 100 CAPSULE, LIQUID FILLED ORAL at 06:14

## 2024-11-16 RX ADMIN — ACETAMINOPHEN 650 MG: 325 TABLET, FILM COATED ORAL at 11:57

## 2024-11-16 RX ADMIN — ACETAMINOPHEN 650 MG: 325 TABLET, FILM COATED ORAL at 00:22

## 2024-11-16 RX ADMIN — IBUPROFEN 800 MG: 400 TABLET ORAL at 11:57

## 2024-11-16 RX ADMIN — IBUPROFEN 800 MG: 400 TABLET ORAL at 06:14

## 2024-11-16 RX ADMIN — ACETAMINOPHEN 650 MG: 325 TABLET, FILM COATED ORAL at 06:14

## 2024-11-16 ASSESSMENT — ACTIVITIES OF DAILY LIVING (ADL)
ADLS_ACUITY_SCORE: 0

## 2024-11-16 NOTE — PROGRESS NOTES
"[facility] Postpartum Progress Note  2024     S: Pt doing well, reports perineum is very sore today. Pain is well controlled. Bleeding Light. Infant is being . Voiding spontaneously.    O:  /73 (BP Location: Left arm, Patient Position: Semi-Hampton's, Cuff Size: Adult Regular)   Pulse 77   Temp 98.2  F (36.8  C) (Oral)   Resp 16   Ht 1.651 m (5' 5\")   Wt 72.1 kg (159 lb)   LMP 2024   Breastfeeding Unknown   BMI 26.46 kg/m     Gen: healthy, alert, and no distress    Resp: nonlabored breathing  Abd: soft, nondistended, appropriately TTP, FF at U  Ext: non-tender, nonedematous    Hemoglobin   Date Value Ref Range Status   2024 11.8 11.7 - 15.7 g/dL Final   2024 12.4 11.7 - 15.7 g/dL Final     Lab Results   Component Value Date    AS Negative 2024       A: 29 year old  PPD#1 s/p . She is meeting all postpartum milestones.  P:   Routine postpartum cares.    Breast feeding strategies discussed  Pain control measures as needed  Reportable signs and symptoms dicussed with the patient  Discharge later today      Lay Hudson MD   Obstetrics, Gynecology, and Infertility         "

## 2024-11-16 NOTE — PLAN OF CARE
Goal Outcome Evaluation:      Plan of Care Reviewed With: patient, spouse    Overall Patient Progress: improvingOverall Patient Progress: improving     Vss, RA.  LS clear. BS present.  UO adequate.  1 small clot passed overnight with no further bleeding noted.  Tylenol and ibuprofen given for pain.  Breastfeeding assistance offered but declined.  Spouse is bedside and supportive.

## 2024-11-16 NOTE — PLAN OF CARE
Goal Outcome Evaluation:      Plan of Care Reviewed With: patient, spouse    Overall Patient Progress: improvingOverall Patient Progress: improving     Vitals stable. Up ad joaquin well. Voiding without difficulty. Pain controlled with tylenol and ibuprofen. Breastfeeding going well. Bonding well with .  supportive at bedside.

## 2024-11-16 NOTE — DISCHARGE INSTRUCTIONS
Warning Signs after Having a Baby    Keep this paper on your fridge or somewhere else where you can see it.    Call your provider if you have any of these symptoms up to 12 weeks after having your baby.    Thoughts of hurting yourself or your baby  Pain in your chest or trouble breathing  Severe headache not helped by pain medicine  Eyesight concerns (blurry vision, seeing spots or flashes of light, other changes to eyesight)  Fainting, shaking or other signs of a seizure    Call 9-1-1 if you feel that it is an emergency.     The symptoms below can happen to anyone after giving birth. They can be very serious. Call your provider if you have any of these warning signs.    My provider s phone number: _______________________    Losing too much blood (hemorrhage)    Call your provider if you soak through a pad in less than an hour or pass blood clots bigger than a golf ball. These may be signs that you are bleeding too much.    Blood clots in the legs or lungs    After you give birth, your body naturally clots its blood to help prevent blood loss. Sometimes this increased clotting can happen in other areas of the body, like the legs or lungs. This can block your blood flow and be very dangerous.     Call your provider if you:  Have a red, swollen spot on the back of your leg that is warm or painful when you touch it.   Are coughing up blood.     Infection    Call your provider if you have any of these symptoms:  Fever of 100.4 F (38 C) or higher.  Pain or redness around your stitches if you had an incision.   Any yellow, white, or green fluid coming from places where you had stitches or surgery.    Mood Problems (postpartum depression)    Many people feel sad or have mood changes after having a baby. But for some people, these mood swings are worse.     Call your provider right away if you feel so anxious or nervous that you can't care for yourself or your baby.    Preeclampsia (high blood pressure)    Even if you  "didn't have high blood pressure when you were pregnant, you are at risk for the high blood pressure disease called preeclampsia. This risk can last up to 12 weeks after giving birth.     Call your provider if you have:   Pain on your right side under your rib cage  Sudden swelling in the hands and face    Remember: You know your body. If something doesn't feel right, get medical help.     For informational purposes only. Not to replace the advice of your health care provider. Copyright 2020 [facility]. All rights reserved. Clinically reviewed by Kinza Jackson, RNC-OB, MSN. [employer] [id number] - Rev .    Postpartum Care at Home With Your Baby: Care Instructions  Overview     After childbirth (postpartum period), your body goes through many changes as you recover. In these weeks after delivery, try to take good care of yourself. Get rest whenever you can and accept help from others.  It may take 4 to 6 weeks to feel like yourself again, and possibly longer if you had a  birth. You may feel sore or very tired as you recover. After delivery, you may continue to have contractions as the uterus returns to the size it was before your pregnancy. You will also have some vaginal bleeding. And you may have pain around the vagina as you heal. Several days after delivery you may also have pain and swelling in your breasts as they fill with milk. There are things you can do at home to help ease these discomforts.  After childbirth, it's common to feel emotional. You may feel irritable, cry easily, and feel happy one minute and sad the next. This is called the \"baby blues.\" Hormone changes are one cause of these emotional changes. These feelings usually get better within a couple of weeks. If they don't, talk to your doctor or midwife.  In the first couple of weeks after you give birth, your doctor or midwife may want to check in with you and make a plan for follow-up care. You will likely have a " complete postpartum visit in the first 3 months after delivery. At that time, your doctor or midwife will check on your recovery and see how you're doing. But if you have questions or concerns before then, you can always call your doctor or midwife.  Follow-up care is a key part of your treatment and safety. Be sure to make and go to all appointments, and call your doctor if you are having problems. It's also a good idea to know your test results and keep a list of the medicines you take.  How can you care for yourself at home?  Taking care of your body  Use pads instead of tampons for bleeding. After birth, you will have bloody vaginal discharge. You may also pass some blood clots that shouldn't be bigger than an egg. Over the next 6 weeks or so, your bleeding should decrease a little every day and slowly change to a pinkish and then whitish discharge.  For cramps or mild pain, try an over-the-counter pain medicine, such as acetaminophen (Tylenol) or ibuprofen (Advil, Motrin). Read and follow all instructions on the label.  To ease pain around the vagina or from hemorrhoids:  Put ice or a cold pack on the area for 10 to 20 minutes at a time. Put a thin cloth between the ice and your skin.  Try sitting in a few inches of warm water (sitz bath) when you can or after bowel movements.  Clean yourself with a gentle squeeze of warm water from a bottle instead of wiping with toilet paper.  Use witch hazel or hemorrhoid pads (such as Tucks).  Try using a cold compress for sore and swollen breasts. And wear a supportive bra that fits.  Ease constipation by drinking plenty of fluids and eating high-fiber foods. Ask your doctor or midwife about over-the-counter stool softeners.  Activity  Rest when you can.  Ask for help from family or friends when you need it.  If you can, have another adult in your home for at least 2 or 3 days after birth.  When you feel ready, try to get some exercise every day. For many people, walking  is a good choice. Don't do any heavy exercise until your doctor or midwife says it's okay.  Ask your doctor or midwife when it is okay to have vaginal sex.  If you don't want to get pregnant, talk to your doctor or midwife about birth control options. You can get pregnant even before your period returns. Also, you can get pregnant while you are breastfeeding.  Talk to your doctor or midwife if you want to get pregnant again. They can talk to you about when it is safe.  Emotional health  It's normal to have some sadness, anxiety, and mood swings after delivery. It may help to talk with a trusted friend or family member. You can also call the Maternal Mental Health Hotline at 7-990-[facility] (1-197.924.2954) for support. If these mood changes last more than a couple of weeks, talk to your doctor or midwife.  When should you call for help?  Share this information with your partner, family, or a friend. They can help you watch for warning signs.  Call 911  anytime you think you may need emergency care. For example, call if:    You feel you cannot stop from hurting yourself, your baby, or someone else.     You passed out (lost consciousness).     You have chest pain, are short of breath, or cough up blood.     You have a seizure.   Where to get help 24 hours a day, 7 days a week   If you or someone you know talks about suicide, self-harm, a mental health crisis, a substance use crisis, or any other kind of emotional distress, get help right away. You can:    Call the Suicide and Crisis Lifeline at 538.     Call 2-770-776-TALK (1-766.940.4839).     Text HOME to 031329 to access the Crisis Text Line.   Consider saving these numbers in your phone.  Go to [employer].org for more information or to chat online.  Call your doctor or midwife now or seek immediate medical care if:    You have signs of hemorrhage (too much bleeding), such as:  Heavy vaginal bleeding. This means that you are soaking through one or more pads in an  "hour. Or you pass blood clots bigger than an egg.  Feeling dizzy or lightheaded, or you feel like you may faint.  Feeling so tired or weak that you cannot do your usual activities.  A fast or irregular heartbeat.  New or worse belly pain.     You have signs of infection, such as:  A fever.  Increased pain, swelling, warmth, or redness from an incision or wound.  Frequent or painful urination or blood in your urine.  Vaginal discharge that smells bad.  New or worse belly pain.     You have symptoms of a blood clot in your leg (called a deep vein thrombosis), such as:  Pain in the calf, back of the knee, thigh, or groin.  Swelling in the leg or groin.  A color change on the leg or groin. The skin may be reddish or purplish, depending on your usual skin color.     You have signs of preeclampsia, such as:  Sudden swelling of your face, hands, or feet.  New vision problems (such as dimness, blurring, or seeing spots).  A severe headache.     You have signs of heart failure, such as:  New or increased shortness of breath.  New or worse swelling in your legs, ankles, or feet.  Sudden weight gain, such as more than 2 to 3 pounds in a day or 5 pounds in a week.  Feeling so tired or weak that you cannot do your usual activities.     You had spinal or epidural pain relief and have:  New or worse back pain.  Increased pain, swelling, warmth, or redness at the injection site.  Tingling, weakness, or numbness in your legs or groin.   Watch closely for changes in your health, and be sure to contact your doctor or midwife if:    Your vaginal bleeding isn't decreasing.     You feel sad, anxious, or hopeless for more than a few days.     You are having problems with your breasts or breastfeeding.   Where can you learn more?  Go to https://www.healthwise.net/patiented  Enter Z768 in the search box to learn more about \"Postpartum Care at Home With Your Baby: Care Instructions.\"  Current as of: July 10, 2023  Content Version: 14.2    " 2024 [facility] [employer]   Care instructions adapted under license by your healthcare professional. If you have questions about a medical condition or this instruction, always ask your healthcare professional. [employer] disclaims any warranty or liability for your use of this information.

## 2024-11-16 NOTE — PLAN OF CARE
Goal Outcome Evaluation:      Plan of Care Reviewed With: patient, spouse    Overall Patient Progress: improvingOverall Patient Progress: improving     Vitals stable. Up ad joaquin well. Voiding without difficulty. Pain controlled with tylenol and ibuprofen. Breastfeeding going well. Depression screen and birth certificate completed. Discharging home today with .

## 2024-11-16 NOTE — PLAN OF CARE
Mom and baby discharge instructions discussed with pt who verbalizes understanding. Discharge prescriptions filled at discharge pharmacy and given to pt. Mom and baby ID bands matched. Mom and baby ready for discharge home.

## 2024-12-01 ENCOUNTER — HEALTH MAINTENANCE LETTER (OUTPATIENT)
Age: 29
End: 2024-12-01

## 2025-05-02 ENCOUNTER — OFFICE VISIT (OUTPATIENT)
Dept: FAMILY MEDICINE | Facility: CLINIC | Age: 30
End: 2025-05-02
Payer: COMMERCIAL

## 2025-05-02 ENCOUNTER — ORDERS ONLY (AUTO-RELEASED) (OUTPATIENT)
Dept: FAMILY MEDICINE | Facility: CLINIC | Age: 30
End: 2025-05-02

## 2025-05-02 VITALS
TEMPERATURE: 97.8 F | HEART RATE: 72 BPM | RESPIRATION RATE: 16 BRPM | WEIGHT: 136.3 LBS | SYSTOLIC BLOOD PRESSURE: 108 MMHG | DIASTOLIC BLOOD PRESSURE: 76 MMHG | OXYGEN SATURATION: 100 % | BODY MASS INDEX: 22.68 KG/M2

## 2025-05-02 DIAGNOSIS — R42 DIZZINESS: Primary | ICD-10-CM

## 2025-05-02 DIAGNOSIS — R79.0 DECREASED IRON STORES: ICD-10-CM

## 2025-05-02 DIAGNOSIS — R00.2 HEART PALPITATIONS: ICD-10-CM

## 2025-05-02 LAB
ALBUMIN SERPL BCG-MCNC: 4.3 G/DL (ref 3.5–5.2)
ALP SERPL-CCNC: 59 U/L (ref 40–150)
ALT SERPL W P-5'-P-CCNC: 21 U/L (ref 0–50)
ANION GAP SERPL CALCULATED.3IONS-SCNC: 10 MMOL/L (ref 7–15)
AST SERPL W P-5'-P-CCNC: 21 U/L (ref 0–45)
BILIRUB SERPL-MCNC: 0.2 MG/DL
BUN SERPL-MCNC: 13 MG/DL (ref 6–20)
CALCIUM SERPL-MCNC: 9.5 MG/DL (ref 8.8–10.4)
CHLORIDE SERPL-SCNC: 103 MMOL/L (ref 98–107)
CREAT SERPL-MCNC: 0.72 MG/DL (ref 0.51–0.95)
EGFRCR SERPLBLD CKD-EPI 2021: >90 ML/MIN/1.73M2
ERYTHROCYTE [DISTWIDTH] IN BLOOD BY AUTOMATED COUNT: 12.2 % (ref 10–15)
FERRITIN SERPL-MCNC: 18 NG/ML (ref 6–175)
GLUCOSE SERPL-MCNC: 84 MG/DL (ref 70–99)
HCO3 SERPL-SCNC: 26 MMOL/L (ref 22–29)
HCT VFR BLD AUTO: 40.8 % (ref 35–47)
HGB BLD-MCNC: 14 G/DL (ref 11.7–15.7)
IRON BINDING CAPACITY (ROCHE): 301 UG/DL (ref 240–430)
IRON SATN MFR SERPL: 8 % (ref 15–46)
IRON SERPL-MCNC: 24 UG/DL (ref 37–145)
MCH RBC QN AUTO: 32.8 PG (ref 26.5–33)
MCHC RBC AUTO-ENTMCNC: 34.3 G/DL (ref 31.5–36.5)
MCV RBC AUTO: 96 FL (ref 78–100)
PLATELET # BLD AUTO: 208 10E3/UL (ref 150–450)
POTASSIUM SERPL-SCNC: 4.4 MMOL/L (ref 3.4–5.3)
PROT SERPL-MCNC: 7 G/DL (ref 6.4–8.3)
RBC # BLD AUTO: 4.27 10E6/UL (ref 3.8–5.2)
SODIUM SERPL-SCNC: 139 MMOL/L (ref 135–145)
T4 FREE SERPL-MCNC: 1.13 NG/DL (ref 0.9–1.7)
TSH SERPL DL<=0.005 MIU/L-ACNC: 1.42 UIU/ML (ref 0.3–4.2)
WBC # BLD AUTO: 11.7 10E3/UL (ref 4–11)

## 2025-05-02 PROCEDURE — 3074F SYST BP LT 130 MM HG: CPT | Performed by: NURSE PRACTITIONER

## 2025-05-02 PROCEDURE — 85027 COMPLETE CBC AUTOMATED: CPT | Performed by: NURSE PRACTITIONER

## 2025-05-02 PROCEDURE — 36415 COLL VENOUS BLD VENIPUNCTURE: CPT | Performed by: NURSE PRACTITIONER

## 2025-05-02 PROCEDURE — 84443 ASSAY THYROID STIM HORMONE: CPT | Performed by: NURSE PRACTITIONER

## 2025-05-02 PROCEDURE — 99214 OFFICE O/P EST MOD 30 MIN: CPT | Performed by: NURSE PRACTITIONER

## 2025-05-02 PROCEDURE — 84439 ASSAY OF FREE THYROXINE: CPT | Performed by: NURSE PRACTITIONER

## 2025-05-02 PROCEDURE — 3078F DIAST BP <80 MM HG: CPT | Performed by: NURSE PRACTITIONER

## 2025-05-02 PROCEDURE — 83540 ASSAY OF IRON: CPT | Performed by: NURSE PRACTITIONER

## 2025-05-02 PROCEDURE — 82728 ASSAY OF FERRITIN: CPT | Performed by: NURSE PRACTITIONER

## 2025-05-02 PROCEDURE — 1126F AMNT PAIN NOTED NONE PRSNT: CPT | Performed by: NURSE PRACTITIONER

## 2025-05-02 PROCEDURE — 83550 IRON BINDING TEST: CPT | Performed by: NURSE PRACTITIONER

## 2025-05-02 PROCEDURE — 80053 COMPREHEN METABOLIC PANEL: CPT | Performed by: NURSE PRACTITIONER

## 2025-05-02 ASSESSMENT — PAIN SCALES - GENERAL: PAINLEVEL_OUTOF10: NO PAIN (0)

## 2025-05-02 NOTE — PATIENT INSTRUCTIONS
PLAN:   1.   Symptomatic therapy suggested: rest, increase fluids, and call prn if symptoms persist or worsen.  2.  Orders Placed This Encounter   Procedures    CBC with platelets    Ferritin    Iron & Iron Binding Capacity    T4 free    TSH    Comprehensive metabolic panel     3.  FURTHER TESTING:       - zio patch   Will follow up and/or notify patient of  results via My Chart to determine further need for followup    Patient needs to follow up in if no improvement,or sooner if worsening of symptoms or other symptoms develop.

## 2025-05-02 NOTE — PROGRESS NOTES
Subjective   Alicia is a 29 year old, presenting for the following health issues:  Consult (Heart palpitation, fatigue, lightheadedness  began about 1-2 months ago.  She is 5.5 months post partum)      5/2/2025    11:14 AM   Additional Questions   Roomed by Landy MCKNIGHT   Accompanied by self     History of Present Illness       Reason for visit:  Heart palpitations, dizziness  Symptom onset:  More than a month  Symptoms include:  Periodic heart palpitations, periodic dizziness and lightheadedness  Symptom intensity:  Moderate  Symptom progression:  Staying the same  Had these symptoms before:  Yes  Has tried/received treatment for these symptoms:  No  What makes it worse:  Not that I've noticed  What makes it better:  Not that I've noticed   She is taking medications regularly.      Palpitations/Irregular rhythm  Onset:  has had issues with heart palpitations in the past   First time she remembers about 4 to 5 years ago   Duration: she is 5 months post partum. In the last couple months more episodes of dizziness and heart palpitation   Saw GYN a month ago and did cbc, basic and TSH which were normal   Had a heart monitor in 2019   Last menses did bleed for 7 days with her menses   Description: intermittent issues with dizziness and heart palpations     Chest pain or pressure: YES some pressure   Location: left side  Radiation: no            Intensity: intermittent   Progression of symptoms: worsening and intermittent  Accompanying signs and symptoms:       Shortness of breath: YES- intermittently        Sweating: No       Nausea/vomiting: No       Lightheadedness: YES       Palpitations: YES       Fever/chills: No       Cough: No       Heartburn: No  History   Family history of heart disease: YES- father passed at 57 with MI   Tobacco use: No           Caffeine use: No           Alcohol use: YES- very rare            Drug/other chemical use: No  Previous similar symptoms: YES  Precipitating factors:   No  Worse with  exertion: No  Worse with deep breaths: No           Related to eating: No           Better with burping: No  Alleviating factors: nothing   Therapies tried and outcome: very random       Lab work is in process  Labs reviewed in EPIC  BP Readings from Last 3 Encounters:   05/02/25 108/76   11/16/24 119/76   05/28/24 126/85    Wt Readings from Last 3 Encounters:   05/02/25 61.8 kg (136 lb 4.8 oz)   11/16/24 66.6 kg (146 lb 14.4 oz)   05/28/24 63.5 kg (140 lb)                  Patient Active Problem List   Diagnosis     Normal pregnancy in second trimester     Need for Tdap vaccination     PCOS (polycystic ovarian syndrome)     Indication for care in labor or delivery     Past Surgical History:   Procedure Laterality Date     COLONOSCOPY  07/14/2022     WISDOM TOOTH EXTRACTION         Social History     Tobacco Use     Smoking status: Never     Passive exposure: Past     Smokeless tobacco: Never   Substance Use Topics     Alcohol use: Not Currently     Alcohol/week: 1.0 standard drink of alcohol     Types: 1 Standard drinks or equivalent per week     Comment: Alcoholic Drinks/day: Social      Family History   Problem Relation Age of Onset     LUNG DISEASE Mother      Osteoporosis Mother      Heart Disease Father      GI problems Sister      Allergy (Severe) Sister      Lung Cancer Maternal Grandmother      Cervical Cancer Maternal Grandfather          Current Outpatient Medications   Medication Sig Dispense Refill     magnesium 250 MG tablet Take 1 tablet by mouth daily 200mg       PRENATAL VIT-FE BISG-FA-DHA PO        Vitamin D3 (CHOLECALCIFEROL) 25 mcg (1000 units) tablet Take by mouth daily       No Known Allergies          Review of Systems  Constitutional, HEENT, cardiovascular, pulmonary, gi and gu systems are negative, except as otherwise noted.      Objective    /76 (BP Location: Right arm, Patient Position: Sitting, Cuff Size: Adult Regular)   Pulse 72   Temp 97.8  F (36.6  C) (Oral)   Resp 16   Wt  61.8 kg (136 lb 4.8 oz)   LMP 04/22/2025   SpO2 100%   BMI 22.68 kg/m    Body mass index is 22.68 kg/m .  Physical Exam   GENERAL: alert and no distress  EYES: Eyes grossly normal to inspection and conjunctivae and sclerae normal  HENT: ear canals and TM's normal, nose and mouth without ulcers or lesions  NECK: no adenopathy, no asymmetry, masses, or scars  RESP: lungs clear to auscultation - no rales, rhonchi or wheezes  CV: regular rates and rhythm, no murmur, click or rub, peripheral pulses strong, and no peripheral edema  ABDOMEN: soft, nontender  MS: no gross musculoskeletal defects noted, no edema  SKIN: no suspicious lesions or rashes  NEURO: Normal strength and tone, mentation intact and speech normal  PSYCH: mentation appears normal, affect normal/bright  LYMPH: no cervical adenopathy    Results for orders placed or performed in visit on 05/02/25   CBC with platelets     Status: Abnormal   Result Value Ref Range    WBC Count 11.7 (H) 4.0 - 11.0 10e3/uL    RBC Count 4.27 3.80 - 5.20 10e6/uL    Hemoglobin 14.0 11.7 - 15.7 g/dL    Hematocrit 40.8 35.0 - 47.0 %    MCV 96 78 - 100 fL    MCH 32.8 26.5 - 33.0 pg    MCHC 34.3 31.5 - 36.5 g/dL    RDW 12.2 10.0 - 15.0 %    Platelet Count 208 150 - 450 10e3/uL   Ferritin     Status: Normal   Result Value Ref Range    Ferritin 18 6 - 175 ng/mL   Iron & Iron Binding Capacity     Status: Abnormal   Result Value Ref Range    Iron 24 (L) 37 - 145 ug/dL    Iron Binding Capacity 301 240 - 430 ug/dL    Iron Sat Index 8 (L) 15 - 46 %   T4 free     Status: Normal   Result Value Ref Range    Free T4 1.13 0.90 - 1.70 ng/dL   TSH     Status: Normal   Result Value Ref Range    TSH 1.42 0.30 - 4.20 uIU/mL   Comprehensive metabolic panel     Status: Normal   Result Value Ref Range    Sodium 139 135 - 145 mmol/L    Potassium 4.4 3.4 - 5.3 mmol/L    Carbon Dioxide (CO2) 26 22 - 29 mmol/L    Anion Gap 10 7 - 15 mmol/L    Urea Nitrogen 13.0 6.0 - 20.0 mg/dL    Creatinine 0.72 0.51 -  "0.95 mg/dL    GFR Estimate >90 >60 mL/min/1.73m2    Calcium 9.5 8.8 - 10.4 mg/dL    Chloride 103 98 - 107 mmol/L    Glucose 84 70 - 99 mg/dL    Alkaline Phosphatase 59 40 - 150 U/L    AST 21 0 - 45 U/L    ALT 21 0 - 50 U/L    Protein Total 7.0 6.4 - 8.3 g/dL    Albumin 4.3 3.5 - 5.2 g/dL    Bilirubin Total 0.2 <=1.2 mg/dL       Assessment & Plan     Dizziness  Will follow up and/or notify patient of  results via My Chart to determine further need for followup   - CBC with platelets  - Ferritin  - Iron & Iron Binding Capacity  - Comprehensive metabolic panel    Heart palpitations  Will check for heart arrythmias  Will mail out zio patch   Will follow up and/or notify patient of  results via My Chart to determine further need for followup   - T4 free  - TSH  - ZIO PATCH MAIL OUT  - T4 free  - TSH    Decreased iron stores  Will follow up and/or notify patient of  results via My Chart to determine further need for followup   - CBC with platelets  - Iron & Iron Binding Capacity      BMI  Estimated body mass index is 22.68 kg/m  as calculated from the following:    Height as of 11/14/24: 1.651 m (5' 5\").    Weight as of this encounter: 61.8 kg (136 lb 4.8 oz).       See Patient Instructions  Patient Instructions   PLAN:   1.   Symptomatic therapy suggested: rest, increase fluids, and call prn if symptoms persist or worsen.  2.  Orders Placed This Encounter   Procedures     CBC with platelets     Ferritin     Iron & Iron Binding Capacity     T4 free     TSH     Comprehensive metabolic panel     3.  FURTHER TESTING:       - zio patch   Will follow up and/or notify patient of  results via My Chart to determine further need for followup    Patient needs to follow up in if no improvement,or sooner if worsening of symptoms or other symptoms develop.                 Signed Electronically by: JOSEFA Montana CNP    "

## 2025-05-04 NOTE — RESULT ENCOUNTER NOTE
Marine Reyes,    Attached are your test results.  -Normal red blood cell (hgb) levels, high white blood cell count and normal platelet levels. ADVISE: Recheck within a month  -Liver and gallbladder tests are normal (ALT,AST, Alk phos, bilirubin), kidney function is normal (Cr, GFR), sodium is normal, potassium is normal, calcium is normal, glucose is normal.  -TSH (thyroid stimulating hormone) level is normal which indicates normal thyroid function.  -Ferritin (iron) level is normal.  -Iron level is low.  ADVISE: please take over the counter iron supplements  325 mg daily This may turn your stool black.  Take iron with a full glass of water or orange juice.  Repeat CBC in 1 month.  Iron may need to be taken for 1 months.  Please contact us if you have any questions.    Marielos Bowers, CNP

## 2025-05-19 LAB — CV ZIO PRELIM RESULTS: NORMAL

## 2025-05-20 ENCOUNTER — RESULTS FOLLOW-UP (OUTPATIENT)
Dept: FAMILY MEDICINE | Facility: CLINIC | Age: 30
End: 2025-05-20

## 2025-05-27 NOTE — RESULT ENCOUNTER NOTE
Marine Reyes,    Attached are your test results.  Heart monitor showed predominantly sinus rhythm and no significant arrhythmias noted     Please contact us if you have any questions.    Marielos Bowers, CNP

## 2025-05-30 NOTE — RESULT ENCOUNTER NOTE
Marine Reyes,    Attached are your test results.  -Normal red blood cell (hgb) levels, normal white blood cell count and normal platelet levels.    Please contact us if you have any questions.    Marielos Bowers, CNP

## 2025-06-01 NOTE — RESULT ENCOUNTER NOTE
Marine Reyes,    Attached are your test results.  -Vitamin B12 result is normal  -Iron level is normal.    Please contact us if you have any questions.    Marielos Bowers, CNP

## 2025-06-04 NOTE — RESULT ENCOUNTER NOTE
Marine Reyes,    Attached are your test results.  B12 marker is normal     Please contact us if you have any questions.    Marielos Bowers, CNP

## 2025-07-30 ENCOUNTER — TRANSFERRED RECORDS (OUTPATIENT)
Dept: HEALTH INFORMATION MANAGEMENT | Facility: CLINIC | Age: 30
End: 2025-07-30
Payer: COMMERCIAL